# Patient Record
Sex: MALE | Race: WHITE | NOT HISPANIC OR LATINO | Employment: OTHER | URBAN - METROPOLITAN AREA
[De-identification: names, ages, dates, MRNs, and addresses within clinical notes are randomized per-mention and may not be internally consistent; named-entity substitution may affect disease eponyms.]

---

## 2018-02-09 ENCOUNTER — OFFICE VISIT CONVERTED (OUTPATIENT)
Dept: ORTHOPEDIC SURGERY | Facility: CLINIC | Age: 63
End: 2018-02-09
Attending: ORTHOPAEDIC SURGERY

## 2018-05-11 ENCOUNTER — OFFICE VISIT CONVERTED (OUTPATIENT)
Dept: ORTHOPEDIC SURGERY | Facility: CLINIC | Age: 63
End: 2018-05-11
Attending: ORTHOPAEDIC SURGERY

## 2018-05-31 ENCOUNTER — OFFICE VISIT CONVERTED (OUTPATIENT)
Dept: PULMONOLOGY | Facility: CLINIC | Age: 63
End: 2018-05-31
Attending: INTERNAL MEDICINE

## 2018-09-28 ENCOUNTER — CONVERSION ENCOUNTER (OUTPATIENT)
Dept: ORTHOPEDIC SURGERY | Facility: CLINIC | Age: 63
End: 2018-09-28

## 2018-09-28 ENCOUNTER — OFFICE VISIT CONVERTED (OUTPATIENT)
Dept: ORTHOPEDIC SURGERY | Facility: CLINIC | Age: 63
End: 2018-09-28
Attending: ORTHOPAEDIC SURGERY

## 2019-08-27 ENCOUNTER — OFFICE VISIT CONVERTED (OUTPATIENT)
Dept: PULMONOLOGY | Facility: CLINIC | Age: 64
End: 2019-08-27
Attending: PHYSICIAN ASSISTANT

## 2019-08-27 ENCOUNTER — HOSPITAL ENCOUNTER (OUTPATIENT)
Dept: GENERAL RADIOLOGY | Facility: HOSPITAL | Age: 64
Discharge: HOME | End: 2019-08-27
Attending: PHYSICIAN ASSISTANT

## 2019-08-28 ENCOUNTER — HOSPITAL ENCOUNTER (OUTPATIENT)
Dept: CARDIOLOGY | Facility: HOSPITAL | Age: 64
Discharge: HOME | End: 2019-08-28
Attending: FAMILY MEDICINE

## 2019-09-09 ENCOUNTER — OFFICE VISIT CONVERTED (OUTPATIENT)
Dept: PULMONOLOGY | Facility: CLINIC | Age: 64
End: 2019-09-09
Attending: PHYSICIAN ASSISTANT

## 2019-09-09 ENCOUNTER — HOSPITAL ENCOUNTER (OUTPATIENT)
Dept: GENERAL RADIOLOGY | Facility: HOSPITAL | Age: 64
Discharge: HOME | End: 2019-09-09
Attending: PHYSICIAN ASSISTANT

## 2021-05-16 VITALS — HEIGHT: 70 IN | BODY MASS INDEX: 39.08 KG/M2 | WEIGHT: 273 LBS

## 2021-05-16 VITALS — BODY MASS INDEX: 40.09 KG/M2 | WEIGHT: 280 LBS | HEIGHT: 70 IN | OXYGEN SATURATION: 98 % | HEART RATE: 57 BPM

## 2021-05-16 VITALS — HEIGHT: 70 IN | BODY MASS INDEX: 41.52 KG/M2 | WEIGHT: 290 LBS

## 2021-05-28 VITALS
HEART RATE: 90 BPM | TEMPERATURE: 99.2 F | WEIGHT: 277.12 LBS | SYSTOLIC BLOOD PRESSURE: 149 MMHG | RESPIRATION RATE: 12 BRPM | DIASTOLIC BLOOD PRESSURE: 71 MMHG | HEIGHT: 70 IN | BODY MASS INDEX: 39.67 KG/M2 | OXYGEN SATURATION: 100 %

## 2021-05-28 VITALS
HEIGHT: 70 IN | TEMPERATURE: 98.3 F | DIASTOLIC BLOOD PRESSURE: 90 MMHG | DIASTOLIC BLOOD PRESSURE: 84 MMHG | HEART RATE: 123 BPM | SYSTOLIC BLOOD PRESSURE: 125 MMHG | OXYGEN SATURATION: 99 % | HEIGHT: 70 IN | RESPIRATION RATE: 14 BRPM | WEIGHT: 238 LBS | BODY MASS INDEX: 32.65 KG/M2 | SYSTOLIC BLOOD PRESSURE: 150 MMHG | RESPIRATION RATE: 16 BRPM | HEART RATE: 66 BPM | WEIGHT: 228.06 LBS | OXYGEN SATURATION: 97 % | TEMPERATURE: 97.7 F | BODY MASS INDEX: 34.07 KG/M2

## 2021-05-28 NOTE — PROGRESS NOTES
Patient: CHINYERE OH     Acct: [id number]     Report: #[id number]  UNIT #: [id number]     : 1955    Encounter Date:2019  PRIMARY CARE: Cory Khan  ***Signed***  --------------------------------------------------------------------------------------------------------------------  Chief Complaint      Encounter Date      Sep 9, 2019            Primary Care Provider      Cory Khan            Referring Provider      SELF,REFERRED            Patient Complaint      Patient is complaining of      Pt here for 2-4wk f/u,  increased SOA            VITALS      Height 5 ft 10 in / 177.8 cm      Weight 228 lbs 1 oz / 103.640467 kg      BSA 2.21 m2      BMI 32.7 kg/m2      Temperature 97.7 F / 36.5 C - Oral      Pulse 66      Respirations 14      Blood Pressure 125/84 Sitting, Left Arm      Pulse Oximetry 99%, Room air            HPI      The patient is a very pleasant 64 year old white male previously seen by Dr. Joe in May 2018 then saw me 2 weeks ago for an acute visit for increased     shortness of breath. He was volume overloaded, appeared to have an acute heart     failure exacerbation but type of heart failure at that time was unknown. I     instructed him to start taking 40 mg Lasix daily prescribed by his primary care     provider Dr. Khan and instructed him to have the echocardiogram done ordered by    his primary care provider. When he went for echocardiogram at [facility] he was found to have severe systolic dysfunction with an ejection     fraction of 20%. He was seen by Dr. Ohara, started on additional medications    and set up for a Life Vest with plans for ICD in 3 months if the patient's LV     function did not improve with guideline directed medical therapy. The patient     states he is doing quite a bit better now and his shortness of breath has     significantly improved as well as his lower extremity edema. He reports     compliance with his  diuretics but has not been wearing his Life Vest, weighing     himself daily or following low sodium diet. He also has a history of obstructive    sleep apnea and was supposed to be scheduled for a repeat sleep study last year,    but was lost to follow up and never that that done. He also has a history of     spinal cord injury and repeat split night study was going to be done to re-    evaluate his sleep apnea after his spinal cord injury.             I reviewed his Review of Systems, medical, surgical and family history and agree    with those as entered.      Copies To:   Prakash Joe      Constitutional:  Denies: Fatigue, Fever, Weight gain, Weight loss, Chills,     Insomnia, Other      Respiratory/Breathing:  Denies: Shortness of air, Wheezing, Cough, Hemoptysis,     Pleuritic pain, Other      Endocrine:  Denies: Polydipsia, Polyuria, Heat/cold intolerance, Diabetes, Other      Eyes:  Denies: Blurred vision, Vision Changes, Other      Ears, nose, mouth, throat:  Denies: Mouth lesions, Thrush, Throat pain,     Hoarseness, Allergies/Hay Fever, Post Nasal Drip, Headaches, Recent Head Injury,    Nose Bleeding, Neck Stiffness, Thyroid Mass, Hearing Loss, Ear Fullness, Dry     Mouth, Nasal or Sinus Pain, Dry Lips, Nasal discharge, Nasal congestion, Other      Cardiovascular:  Denies: Palpitations, Syncope, Claudication, Chest Pain, Wake     up Gasping for air, Leg Swelling, Irregular Heart Rate, Cyanosis, Dyspnea on     Exertion, Other      Gastrointestinal:  Denies: Nausea, Constipation, Diarrhea, Abdominal pain,     Vomiting, Difficulty Swallowing, Reflux/Heartburn, Dysphagia, Jaundice,     Bloating, Melena, Bloody stools, Other      Genitourinary:  Denies: Urinary frequency, Incontinence, Hematuria, Urgency,     Nocturia, Dysuria, Testicular problems, Other      Musculoskeletal:  Denies: Joint Pain, Joint Stiffness, Joint Swelling, Myalgias,    Other      Hematologic/lymphatic:  DENIES:  Lymphadenopathy, Bruising, Bleeding tendencies,     Other      Neurological:  Denies: Headache, Numbness, Weakness, Seizures, Other      Psychiatric:  Denies: Anxiety, Appropriate Effect, Depression, Other      Sleep:  No: Excessive daytime sleep, Morning Headache?, Snoring, Insomnia?, Stop    breathing at sleep?, Other      Integumentary:  Denies: Rash, Dry skin, Skin Warm to Touch, Other      Immunologic/Allergic:  Denies: Latex allergy, Seasonal allergies, Asthma,     Urticaria, Eczema, Other      Immunization status:  No: Up to date            FAMILY/SOCIAL/MEDICAL HX      Surgical History:  Yes: Appendectomy (1967), Orthopedic Surgery (RT TOTAL KNEE     07/08, LT TOTAL KNEE 08, LT KNEE RE.09, ROTATOR CUFF LT 2012), Other Surgeries     (PILONIDAL CYST 1977 AND 1979, cervical effusion)      Cancer/Type - Family Hx:  Father      Is Father Still Living?:  No      Is Mother Still Living?:  No      Smoking status:  Never smoker      Anticoagulation Therapy:  Yes      Antibiotic Prophylaxis:  Yes      Medical History:  Yes: Allergies, Arthritis (WIDESPREAD OSTEO), Chronic     Bronchitis/COPD, High Blood Pressure (MEDS CONTROLLED), High Cholesterol,     Shortness Of Breath (INHALERS FOR SEASONAL ALLERGY WHEEZING), Miscellaneous     Medical/oth (LEFT LEG AND ARM WEAKNESS); No: Asthma, Blood Disease,     Chemotherapy/Cancer, Congestive Heart Failu, Deafness or Ringing Ears, Diabetes,    Seizures, Heart Attack, Hemorrhoids/Rectal Prob, Sinus Trouble      Psychiatric History      None            PREVENTION      Hx Influenza Vaccination:  Yes      Date Influenza Vaccine Given:  Nov 1, 2018      Influenza Vaccine Declined:  No      2 or More Falls Past Year?:  No      Fall Past Year with Injury?:  No      Hx Pneumococcal Vaccination:  Yes      Encouraged to follow-up with:  PCP regarding preventative exams.      Chart initiated by      Ronda Sloan Ma            ALLERGIES/MEDICATIONS      Allergies:        Coded Allergies:              *No Known Allergies (Verified  Allergy, Unknown, 9/9/19)      Uncoded Allergies:             NO KNOWN DRUG ALLERGY (Allergy, Unknown, NONE, 2/26/18)      Medications    Last Reconciled on 9/9/19 16:18 by JORGE CASTRO      Potassium Chloride (K-Dur*) 10 Meq Tab.er.prt      10 MEQ PO QDAY, #30 TAB 0 Refills         Prov: Conor Ohara         8/30/19       Furosemide (Furosemide) 40 Mg Tablet      40 MG PO QDAY, #30 TAB 0 Refills         Prov: Conor Ohara         8/30/19       Spironolactone (Aldactone) 25 Mg Tablet      25 MG PO QDAY, #30 TAB 0 Refills         Prov: Marcos Oharanan         8/30/19       Aspirin EC (Aspirin EC) 81 Mg Tablet.dr      162 MG PO QDAY, #60 TAB 0 Refills         Reported         8/28/19       Gabapentin (Gabapentin) 800 Mg Tablet      800 MG PO QID, #90 TAB 0 Refills         Reported         8/28/19       Cyclobenzaprine Hcl (Cyclobenzaprine*) 10 Mg Tablet      10 MG PO TID, TAB 0 Refills         Reported         5/31/18       Zolpidem Tartrate (Ambien) 10 Mg Tablet      10 MG PO HS, #30 TAB 0 Refills         Reported         5/31/18       Modafinil (Modafinil) 200 Mg Tab      300 MG PO BID, #30 TAB         Reported         5/31/18       Ibuprofen (Motrin) 800 Mg Tablet      800 MG PO TID PRN for PAIN, #90 TAB 0 Refills         Reported         2/12/18       MDI-Albuterol (Ventolin HFA) 8 Gm Hfa.aer.ad      2 PUFFS INH Q4-6H PRN for SHORTNESS OF BREATH, #1 MDI 0 Refills         Reported         2/12/18       Acetaminophen/Hydrocodone 10/325 (Hydrocodone/Acetaminophen 10/325) 1 Each     Tablet      1-2 TAB PO Q6H, TAB         Reported         2/12/18       Famotidine (Pepcid*) 20 Mg Tablet      20 MG PO QDAY, TAB         Reported         5/21/13       Clopidogrel Bisulfate (Plavix) 75 Mg Tablet      75 MG PO QDAY, TAB         Reported         5/20/13       Carvedilol (Coreg) 12.5 Mg Tab      12.5 MG PO BID         Reported         3/6/13       Lisinopril*  (Lisinopril*) 20 Mg Tablet      20 MG PO QDAY, #30         Reported         3/6/13       Multivitamins (Multi-Vitamin) 1 Tab Tablet      1 TAB PO QDAY         Reported         7/30/12      Current Medications      Current Medications Reviewed 9/9/19            EXAM      GEN-patient appears stated age resting comfortable in no acute distress      Eyes-PERRL,  conjunctiva are normal in appearance extraocular muscles are     intact, no scleral icterus      Lymphatic-no swollen or enlarged cervical nodes, or axillary node, or femoral     nodes, or supraclavicular nodes      Mouth normal dentition, no erythema no ulcerations oropharynx appears normal no     exudate no evidence of postnasal drip, MP       Neck-there are no palpable supraclavicular or cervical adenopathy, thyroid is     normal in appearance no apparent nodules, there is no inspiratory or expiratory     stridor      Respiratory-Mildly decreased breath sounds throughout, no wheezes, rhonchi or     crackles, normal work of breathing noted.        Cardiovascular-the heart rate is normal and regular S1 and S2 present with no     murmur or extra heart sounds, there is no JVD or pedal edema present      GI-the abdomen is normal in appearance, bowel sounds present and normal in all     quadrants no hepatosplenomegaly or masses felt      Extremities-no clubbing is present, pulses present in all extremities, capillary    refill time is normal      Musculoskeletal-Normal strength in upper and lower extremities, inspection shows    no evidence of muscle atrophy      Skin-skin is normal in appearance it is warm and dry, no rashes present, no     evidence of cyanosis, palpation reveals no masses      Neurological-the patient is alert and oriented to time place and person, moves     all 4 extremities, normal gait, normal affect and mood, CN2-12 intact      Psych-normal judgment and insight is good, normal mood and affect, alert and     oriented to person, place, and  time, and date      Vtials      Vitals:             Height 5 ft 10 in / 177.8 cm           Weight 228 lbs 1 oz / 103.698065 kg           BSA 2.21 m2           BMI 32.7 kg/m2           Temperature 97.7 F / 36.5 C - Oral           Pulse 66           Respirations 14           Blood Pressure 125/84 Sitting, Left Arm           Pulse Oximetry 99%, Room air            REVIEW      Results Reviewed      [facility] Results Reviewed?:  Yes Prev Lab Results, Yes Prev Radiology Results, Yes     Previous [facility] Records (I personally reviewed the patient's most recent     pulmonary consultation, progress notes and discharge summary.)            Assessment      RADHA (obstructive sleep apnea) - G47.33            Notes      New Diagnostics      * Split Night Sleep Study, Week         Dx: RADHA (obstructive sleep apnea) - G47.33      ASSESSMENT:       1. Acute systolic heart failure exacerbation with ejection fraction 20%, with     volume status now improving.       2. Lower extremity edema, improving.       3. Recent abnormal chest x-ray with shadow versus right lower lobe opacity with     follow up chest x-ray ordered.       4. Obstructive sleep apnea.      5. History of spinal cord injury.       6. Medical noncompliance.              PLAN:      1.  At this time, I have discussed with the patient regarding his systolic heart    failure. I instructed him to continue following up with Dr. Ohara as     scheduled for additional ischemic work up. Continue his cardiac medications per     Dr. Ohara or his primary care provider Dr. Khan. His volume status is     improving.  I instructed him to begin weighing himself every morning, do low sod    ium intake of 2 grams or less per day and fluid restriction of 2 liters or less     per day. He is to call his cardiologist or primary care provider for any weight     gain greater than 3 pounds overnight or 5 pounds in 1 week. The patient     verbalized understanding of this.         2. I have  discussed with him that his recent lab work showed stable renal     function and his potassium level had improved and normalized now that he is on a    potassium supplement.       3. I have already ordered a repeat chest xray to ensure there is no opacity in     the right lower lobe. It appears it is likely a shadow.         4. I have discussed with him regarding repeating a sleep study and he is willing    to have a split night sleep study done for re-evaluation of his obstructive     sleep apnea after his spinal cord injury. I reordered this today.         5. Continue follow up with his primary care provider Dr. Khan regarding     unintended weight loss.       6. Follow up with Dr. Joe in 2-3 months to review his test results and see     how he is doing, sooner if needed.            Patient Education      Time Spent:  > 50% /Coord [facility]                 Disclaimer: Converted document may not contain table formatting or lab diagrams. Please see [employer] for the authenticated document.

## 2021-05-28 NOTE — PROGRESS NOTES
Patient: CHINYERE OH     Acct: [id number]     Report: #[id number]  UNIT #: [id number]     : 1955    Encounter Date:2019  PRIMARY CARE: Cory Khan  ***Signed***  --------------------------------------------------------------------------------------------------------------------  Chief Complaint      Encounter Date      Aug 27, 2019            Primary Care Provider      Cory Khan            Referring Provider      SELF,REFERRED            Patient Complaint      Patient is complaining of      Patient here today for acute visit for increased SOA            VITALS      Height 70 in / 177.8 cm      Weight 238 lbs  / 107.691089 kg      BSA 2.25 m2      BMI 34.1 kg/m2      Temperature 98.3 F / 36.83 C - Oral      Pulse 123      Respirations 16      Blood Pressure 150/90 Sitting, Right Arm      Pulse Oximetry 97%, room air            HPI      The patient is a 64 year old white male with was seen once by Dr. Joe in the    past in 2018 for evaluation of shortness of breath. He had also had bulky     mediastinal and hilar lymphadenopathy seen on a previous chest CT. Follow up CT     was done in 2018 that showed resolution of lymphadenopathy, no pleural     effusion, no suspicious nodules, but some calcified granulomas were seen. He has    had a history of a spinal cord injury and still has some left sided weakness and    paresis, partial paralysis.  He is here today complaining of a two week history     of increased dyspnea on exertion with minimal exertion relieved with rest. He     denies coughing, wheezing, hemoptysis, fever, chills or purulent sputum     production. He is complaining of associated lower extremity edema, has not     weighed himself to see if he has gained weight in the past two weeks or not,     however he does report an approximately 70 pound weight loss in the past six     months to one year and tells me this has not been evaluated. His PCP, Dr. Ray  Bill actually ordered an echocardiogram for the patient and prescribed     Lasix 40 mg daily for the patient to take for two weeks.  He is due to follow up    with Dr. Khan this Friday or three days from now, but has not taken the Lasix.     He tells me he did not want to have to get up more frequently to use the     restroom and was afraid he would be waking up at night to do so.  He tells me     has not been diagnosed with congestive heart failure before.            I have reviewed her ROS, medical, surgical and family history and agree with     those as entered in the chart.      Copies To:   Prakash Joe      Constitutional:  Complains of: Fatigue; Denies: Fever, Weight gain, Weight loss,    Chills, Insomnia, Other      Respiratory/Breathing:  Complains of: Shortness of air, Wheezing, Cough; Denies:    Hemoptysis, Pleuritic pain, Other      Endocrine:  Denies: Polydipsia, Polyuria, Heat/cold intolerance, Diabetes, Other      Eyes:  Denies: Blurred vision, Vision Changes, Other      Ears, nose, mouth, throat:  Complains of: Nasal Discharge; Denies: Congestion,     Dysphagia, Hearing Changes, Nose Bleeding, Throat pain, Tinnitus, Other      Cardiovascular:  Denies: Chest Pain, Exertional dyspnea, Peripheral Edema,     Palpitations, Syncope, Wake up Gasping for air, Orthopnea, Tachycardia, Other      Gastrointestinal:  Denies: Abdominal pain/cramping, Bloody stools, Constipation,    Diarrhea, Melena, Nausea, Vomiting, Other      Genitourinary:  Denies: Dysuria, Urinary frequency, Incontinence, Hematuria,     Urgency, Other      Musculoskeletal:  Denies: Joint Pain, Joint Stiffness, Joint Swelling, Myalgias,    Other      Hematologic/lymphatic:  DENIES: Lymphadenopathy, Bruising, Bleeding tendencies,     Other      Neurologic:  Complains of: Weakness; Denies: Headache, Numbness, Seizures, Other      Psychiatric:  Denies: Anxiety, Appropriate Effect, Depression, Other      Sleep:  No: Excessive  daytime sleep, Morning Headache?, Snoring, Insomnia?, Stop    breathing at sleep?, Other      Integumentary:  Denies: Rash, Dry skin, Skin Warm to Touch, Other            FAMILY/SOCIAL/MEDICAL HX      Surgical History:  Yes: Appendectomy (1967), Orthopedic Surgery (RT TOTAL KNEE     07/08, LT TOTAL KNEE 08, LT KNEE RE.09, ROTATOR CUFF LT 2012), Other Surgeries     (PILONIDAL CYST 1977 AND 1979, cervical effusion)      Cancer/Type - Family Hx:  Father      Is Father Still Living?:  No      Is Mother Still Living?:  No      Smoking status:  Never smoker      Anticoagulation Therapy:  Yes      Antibiotic Prophylaxis:  Yes      Medical History:  Yes: Allergies, Arthritis (WIDESPREAD OSTEO), Chronic     Bronchitis/COPD, High Blood Pressure (MEDS CONTROLLED), High Cholesterol,     Shortness Of Breath (INHALERS FOR SEASONAL ALLERGY WHEEZING), Miscellaneous     Medical/oth (LEFT LEG AND ARM WEAKNESS); No: Asthma, Blood Disease,     Chemotherapy/Cancer, Congestive Heart Failu, Deafness or Ringing Ears, Diabetes,    Seizures, Heart Attack, Hemorrhoids/Rectal Prob, Sinus Trouble      Psychiatric History      none            PREVENTION      Date Influenza Vaccine Given:  Nov 1, 2017      Influenza Vaccine Declined:  No      2 or More Falls Past Year?:  No      Fall Past Year with Injury?:  No      Encouraged to follow-up with:  PCP regarding preventative exams.      Chart initiated by      Danya Preston CMA            ALLERGIES/MEDICATIONS      Allergies:        Coded Allergies:             *No Known Allergies (Verified  Allergy, Unknown, 8/27/19)      Uncoded Allergies:             NO KNOWN DRUG ALLERGY (Allergy, Unknown, NONE, 2/26/18)      Medications    Last Reconciled on 8/27/19 16:13 by JORGE CASTRO      Cyclobenzaprine Hcl (Cyclobenzaprine*) 10 Mg Tablet      10 MG PO TID PRN for MUSCLE SPASMS, TAB 0 Refills         Reported         5/31/18       Zolpidem Tartrate (Ambien) 10 Mg Tablet      10 MG PO HS, #30 TAB 0  Refills         Reported         5/31/18       Modafinil (Modafinil) 200 Mg Tab      300 MG PO BID, #30 TAB         Reported         5/31/18       Devin-Fluticasone (Fluticasone 50 mcg) 16 Gm Spray.susp      2 PUFFS NARE EACH QDAY, #1 BOTTLE 0 Refills         Reported         5/31/18       Cetirizine/Pseudoephedrine (ZyrTEC-D) 1 Each Tab.er.12h      1 TAB PO BID PRN for ALLERGIES for 30 Days, #60 TAB         Reported         2/12/18       Ibuprofen (Motrin) 800 Mg Tablet      800 MG PO TID, #90 TAB 0 Refills         Reported         2/12/18       MDI-Albuterol (Ventolin HFA) 8 Gm Hfa.aer.ad      1 PUFFS INH Q4-6H PRN for SHORTNESS OF BREATH, #1 MDI 0 Refills         Reported         2/12/18       Acetaminophen/Hydrocodone 10/325 (Hydrocodone/Acetaminophen 10/325) 1 Each     Tablet      1 TAB PO Q6H PRN for PAIN, TAB         Reported         2/12/18       Famotidine (Pepcid*) 20 Mg Tablet      20 MG PO QDAY, TAB         Reported         5/21/13       Aspirin EC (Aspirin EC) 325 Mg Tabec      162.5 MG PO QDAY         Reported         5/20/13       Clopidogrel Bisulfate (Plavix) 75 Mg Tablet      75 MG PO QDAY, TAB         Reported         5/20/13       Atorvastatin Calcium (Lipitor*) 20 Mg Tablet      20 MG PO HS, TAB         Reported         5/20/13       Docusate Sodium (Colace) 100 Mg Capsule      100 MG PO BID PRN for CONSTIPATION, CAP         Reported         5/16/13       Gabapentin (Neurontin) 300 Mg Capsule      800 MG PO QID, CAP         Reported         5/2/13       Carvedilol (Coreg) 12.5 Mg Tab      12.5 MG PO BID         Reported         3/6/13       Lisinopril* (Lisinopril*) 20 Mg Tablet      20 MG PO QDAY, #30         Reported         3/6/13       Multivitamins (Multi-Vitamin) 1 Tab Tablet      1 TAB PO QDAY         Reported         7/30/12      Current Medications      Current Medications Reviewed 8/27/19            EXAM      GEN-patient appears stated age resting comfortable in no acute distress       Eyes-PERRL,  conjunctiva are normal in appearance extraocular muscles are     intact, no scleral icterus      Nasal-both nares are patent turbinates appear normal no polyps seen no nasal d    ischarge or ulcerations      Ears-tympanic membranes are normal no erythema no bulging, normal to inspection      Lymphatic-no swollen or enlarged cervical nodes, or axillary node, or femoral     nodes, or supraclavicular nodes      Mouth normal dentition, no erythema no ulcerations oropharynx appears normal no     exudate no evidence of postnasal drip, MP(default value)      Neck-there are no palpable supraclavicular or cervical adenopathy, thyroid is     normal in appearance no apparent nodules, there is no inspiratory or expiratory     stridor      Respiratory-Lungs have mildly decreased breath sounds, but no wheezes, rhonchi     or crackles appreciated.  Normal work of breathing.        Cardiovascular-the heart rate is normal and regular S1 and S2 present with no     murmur or extra heart sounds, bilateral lower extremities are warm and dry with     trace right lower extremity pitting edema, +1-2 pitting edema in the left lower     extremity up to mid calf.        Extremities-no clubbing is present, pulses present in all extremities, capillary    refill time is normal      Musculoskeletal-Normal strength in upper and lower extremities, inspection shows    no evidence of muscle atrophy      Skin-skin is normal in appearance it is warm and dry, no rashes present, no     evidence of cyanosis, palpation reveals no masses      Neurological-the patient is alert and oriented to time place and person, moves     all 4 extremities, normal gait, normal affect and mood, CN2-12 intact      Psych-normal judgment and insight is good, normal mood and affect, alert and     oriented to person, place, time and date      Vitals      Vitals:             Height 70 in / 177.8 cm           Weight 238 lbs  / 107.674139 kg           BSA 2.25 m2            BMI 34.1 kg/m2           Temperature 98.3 F / 36.83 C - Oral           Pulse 123           Respirations 16           Blood Pressure 150/90 Sitting, Right Arm           Pulse Oximetry 97%, room air            REVIEW      Results Reviewed      [facility] Results Reviewed?:  Yes Prev Lab Results, Yes Prev Radiology Results, Yes     Previous [facility] Records      Lab Results      I personally reviewed previous lab work, imaging and provider notes including     most recent chest CT.            Assessment      Acute on chronic diastolic (congestive) heart failure - I50.33            SOB (shortness of breath) - R06.02            Notes      New Diagnostics      * Chest 2 View, As Soon As Possible         Dx: Acute on chronic diastolic (congestive) heart failure - I50.33      * BMP, Week         Dx: Acute on chronic diastolic (congestive) heart failure - I50.33      * Probrain Natriuretic, Week         Dx: Acute on chronic diastolic (congestive) heart failure - I50.33      ASSESSMENT:       1. Dyspnea on exertion.      2.  Likely acute congestive heart failure, type currently unknown.      3. Lower extremity edema.      4. History of mediastinal or hilar lymphadenopathy, had resolved on chest CT     from 06/2018.      5. Obstructive sleep apnea.      6. Unintended weight loss.      7. History of spinal cord injury.      8. Never smoker.      9.  Medical noncompliance.              PLAN:      1.  At this time, I have discussed with the patiaent that given his lower     extremity edema and increased dyspnea as well as abdominal distention, I have     recommended that he go ahead and have the echocardiogram done that his PCP     ordered and start taking the Lasix 40 mg daily that was prescribed by Dr. Khan,    his PCP.  I have advised him to take that first thing in the morning and he     should not have to get up frequently during the night if he is not taking any     evening dose of Lasix.        2.  I will check a BMP and  pro BNP for him in one week after he has been on     Lasix to ensure his renal function and electrolytes are stable.       3.  I will check a chest x-ray for him today to evaluate for pleural effusions     or pulmonary edema.  We will call him with the results.        4. I have counseled him to start checking his weights daily, do 2 gram sodium     restriction and 2 liter fluid restriction daily and he verbalized understanding.           5. I have discussed with him that he has had a significant unintended weight     loss and have recommended additional evaluation such as CT of the chest, abdomen    and pelvis. He declines this, wishes to continue following up with his PCP about    the unintended weight loss.        6. I have discussed with him regarding split night study that was ordered and     not completed in the past and he wishes to hold off on that as well.        7. Of note, patient did ask me for pain medication today, even though we have     never prescribed him pain medication in the past. He gets this from his PCP, Dr. Khan and is due to see him in three days.  If he needs pain medication sooner,     he is to contact Dr. Khan's office.  He verbalized understanding.      8. I will have him follow up in 2-4 weeks with Dr. Joe to see how he is     doing on the Lasix.  He is to call us sooner if needed.            Patient Education      Time Spent:  > 50% /Coord Care                 Disclaimer: Converted document may not contain table formatting or lab diagrams. Please see [employer] for the authenticated document.

## 2021-05-28 NOTE — PROGRESS NOTES
Patient: CHINYERE OH     Acct: [id number]     Report: #[id number]  UNIT #: [id number]     : 1955    Encounter Date:2018  PRIMARY CARE: Cory Khan  ***Signed***  --------------------------------------------------------------------------------------------------------------------  Chief Complaint      Encounter Date      May 31, 2018            Primary Care Provider      Cory Khan            Referring Provider      SELF,REFERRED PATIENT            Patient Complaint      Patient is complaining of      New pt here for Inhaler management            VITALS      Height 5 ft 10 in / 177.8 cm      Weight 277 lbs 2 oz / 125.394361 kg      BSA 2.55 m2      BMI 39.8 kg/m2      Temperature 99.2 F / 37.33 C - Oral      Pulse 90      Respirations 12      Blood Pressure 149/71 Sitting, Left Arm      Pulse Oximetry 100%, Room air            HPI      The patient is a very pleasant 63 year old white male here today to establish     care. The patient was diagnosed with COPD back in . At that time, the     patient had a spinal cord injury and a CT scan of the neck that showed large     bulky mediastinal and hilar lymphadenopathy that was heavily calcified.  The     patient was seen by another pulmonologist here in town and told that he had     COPD despite that he had never smoked.  He was started on three different     inhaler medications, but never noticed any improvement of his symptoms.  The     patient does have chronic shortness of breath, worse with exertion, better with     rest. He feels his shortness of breath is most likely weakness induced from a     spinal cord injury that he had which left him with some left sided paresis.  He     is able to do his ADLs, but does have to have assistance with a walker.  No     cough, wheezing or chest pains and no sputum production.  The patient derived     no benefit from his bronchodilator therapies, quit taking these medications     five months ago  and has no significant change in his symptoms. The patient does     have sleep apnea, does use his mask, but feels that his machine is very old and     would like a new machine. The patient has no signs of systemic manifestations     or sarcoidosis, but never had any follow up imaging in regards to axial imaging     for this mediastinal and hilar lymphadenopathy. The patient with no kidneys and     no hypercalcemia.            ROS      Constitutional:  Denies: Fatigue, Fever, Weight gain, Weight loss, Chills,     Insomnia, Other      Respiratory/Breathing:  Complains of: Shortness of air, Denies: Wheezing, Cough    , Hemoptysis, Pleuritic pain, Other      Endocrine:  Denies: Polydipsia, Polyuria, Heat/cold intolerance, Diabetes, Other      Eyes:  Denies: Blurred vision, Vision Changes, Other      Ears, nose, mouth, throat:  Denies: Mouth lesions, Thrush, Throat pain,     Hoarseness, Allergies/Hay Fever, Post Nasal Drip, Headaches, Recent Head Injury    , Nose Bleeding, Neck Stiffness, Thyroid Mass, Hearing Loss, Ear Fullness, Dry     Mouth, Nasal or Sinus Pain, Dry Lips, Nasal discharge, Nasal congestion, Other      Cardiovascular:  Denies: Palpitations, Syncope, Claudication, Chest Pain, Wake     up Gasping for air, Leg Swelling, Irregular Heart Rate, Cyanosis, Dyspnea on     Exertion, Other      Gastrointestinal:  Denies: Nausea, Constipation, Diarrhea, Abdominal pain,     Vomiting, Difficulty Swallowing, Reflux/Heartburn, Dysphagia, Jaundice, Bloating    , Melena, Bloody stools, Other      Genitourinary:  Denies: Urinary frequency, Incontinence, Hematuria, Urgency,     Nocturia, Dysuria, Testicular problems, Other      Musculoskeletal:  Denies: Joint Pain, Joint Stiffness, Joint Swelling, Myalgias    , Other      Hematologic/lymphatic:  DENIES: Lymphadenopathy, Bruising, Bleeding tendencies,     Other      Neurological:  Denies: Headache, Numbness, Weakness, Seizures, Other      Psychiatric:  Denies: Anxiety,  Appropriate Effect, Depression, Other      Sleep:  No: Excessive daytime sleep, Morning Headache?, Snoring, Insomnia?,     Stop breathing at sleep?, Other      Integumentary:  Denies: Rash, Dry skin, Skin Warm to Touch, Other      Immunologic/Allergic:  Denies: Latex allergy, Seasonal allergies, Asthma,     Urticaria, Eczema, Other      Immunization status:  No: Up to date            FAMILY/SOCIAL/MEDICAL HX      Surgical History:  Yes: Appendectomy (1967), Orthopedic Surgery (RT TOTAL KNEE     07/08, LT TOTAL KNEE 08, LT KNEE RE.09, ROTATOR CUFF LT 2012), Other Surgeries (    PILONIDAL CYST 1977 AND 1979, cervical effusion)      Cancer/Type - Family Hx:  Father      Is Father Still Living?:  No      Is Mother Still Living?:  No      Smoking status:  Never smoker      Anticoagulation Therapy:  Yes      Antibiotic Prophylaxis:  Yes      Medical History:  Yes: Allergies, Arthritis (WIDESPREAD OSTEO), Chronic     Bronchitis/COPD, High Blood Pressure (MEDS CONTROLLED), High Cholesterol,     Shortness Of Breath (INHALERS FOR SEASONAL ALLERGY WHEEZING), Miscellaneous     Medical/oth (LEFT LEG AND ARM WEAKNESS), No: Asthma, Blood Disease, Chemotherapy    /Cancer, Congestive Heart Failu, Deafness or Ringing Ears, Diabetes, Seizures,     Heart Attack, Hemorrhoids/Rectal Prob      Psychiatric History      None            PREVENTION      Hx Influenza Vaccination:  Yes      Date Influenza Vaccine Given:  Nov 1, 2017      Influenza Vaccine Declined:  No      2 or More Falls Past Year?:  No      Fall Past Year with Injury?:  No      Hx Pneumococcal Vaccination:  Yes      Encouraged to follow-up with:  PCP regarding preventative exams.      Chart initiated by      Ronda Sloan MA            ALLERGIES/MEDICATIONS      Allergies:        Coded Allergies:             *No Known Allergies (Verified  Allergy, Unknown, 5/31/18)      Uncoded Allergies:             NO KNOWN DRUG ALLERGY (Allergy, Unknown, NONE, 2/26/18)      Medications       Cyclobenzaprine Hcl (Cyclobenzaprine*) 10 Mg Tablet      10 MG PO TID Y for MUSCLE SPASMS, TAB 0 Refills         Reported         5/31/18       Zolpidem Tartrate (Ambien) 10 Mg Tablet      10 MG PO HS, #30 TAB 0 Refills         Reported         5/31/18       Modafinil (Provigil) 200 Mg Tab      300 MG PO BID, #30 TAB         Reported         5/31/18       Devin-Fluticasone (Fluticasone 50 mcg) 16 Gm Spray.susp      2 PUFFS NARE EACH QDAY, #1 BOTTLE 0 Refills         Reported         5/31/18       Cetirizine/Pseudoephedrine (ZyrTEC-D*) 1 Each Tab.er.12h      1 TAB PO BID Y for ALLERGIES for 30 Days, #60 TAB         Reported         2/12/18       Ibuprofen (Motrin) 800 Mg Tablet      800 MG PO TID, #90 TAB 0 Refills         Reported         2/12/18       Tiotropium Bromide (Spiriva Respimat 2.5 mcg/Puff) 4 Gm Mist.inhal      2 PUFFS INH RTQDAY, #1 MDI 0 Refills         Reported         2/12/18       MDI-Advair 250/50 (Advair 250/50 Diskus) 1 Each Blst.w.dev      1 PUFF INH RTBID, #1 INH 0 Refills         Reported         2/12/18       MDI-Albuterol (Ventolin HFA*) 8 Gm Hfa.aer.ad      1 PUFFS INH Q4-6H Y for SHORTNESS OF BREATH, #1 MDI 0 Refills         Reported         2/12/18       Acetaminophen/Hydrocodone 10/325* (Hydrocodone/Acetaminophen 10/325*) 1 Each     Tablet      1 TAB PO Q6H Y for PAIN, TAB         Reported         2/12/18       Famotidine (Pepcid*) 20 Mg Tablet      20 MG PO QDAY, TAB         Reported         5/21/13       Aspirin EC (Aspirin EC*) 325 Mg Tabec      162.5 MG PO QDAY         Reported         5/20/13       Clopidogrel Bisulfate (Plavix) 75 Mg Tablet      75 MG PO QDAY, TAB         Reported         5/20/13       Atorvastatin Calcium (Lipitor*) 20 Mg Tablet      20 MG PO HS, TAB         Reported         5/20/13       Docusate Sodium (Colace) 100 Mg Capsule      100 MG PO BID Y for CONSTIPATION, CAP         Reported         5/16/13       Gabapentin (Neurontin) 300 Mg Capsule      800 MG PO  QID, CAP         Reported         5/2/13       Carvedilol (Coreg) 12.5 Mg Tab      12.5 MG PO BID         Reported         3/6/13       Lisinopril* (Lisinopril*) 20 Mg Tablet      20 MG PO QDAY, #30         Reported         3/6/13       Multivitamins (Multi-Vitamin) 1 Tab Tablet      1 TAB PO QDAY         Reported         7/30/12      Current Medications      Current Medications Reviewed 5/31/18            EXAM      GEN-patient appears stated age resting comfortable in no acute distress      Eyes-PERRL,  conjunctiva are normal in appearance extraocular muscles are intact    , no scleral icterus      Nasal-both nares are patent turbinates appear normal no polyps seen no nasal     discharge or ulcerations      Ears-tympanic membranes are normal no erythema no bulging, normal to inspection      Lymphatic-no swollen or enlarged cervical nodes, or axillary node, or femoral     nodes, or supraclavicular nodes      Mouth normal dentition, no erythema no ulcerations oropharynx appears normal no     exudate no evidence of postnasal drip, MP(2)      Neck-there are no palpable supraclavicular or cervical adenopathy, thyroid is     normal in appearance no apparent nodules, there is no inspiratory or expiratory     stridor      Respiratory-patient exhibits normal work of breathing, speaking in full     sentences without difficulty, the chest is normal in appearance, clear to     auscultation with no wheezes rales or rhonchi, chest is normal to percussion on     both the right and left sides      Cardiovascular-the heart rate is normal and regular S1 and S2 present with no     murmur or extra heart sounds, there is no JVD or pedal edema present      GI-the abdomen is normal in appearance, bowel sounds present and normal in all     quadrants no hepatosplenomegaly or masses felt      Extremities-no clubbing is present, pulses present in all extremities,     capillary refill time is normal      Musculoskeletal-Decreased strength in  the left upper and left lower extremity.        Skin-skin is normal in appearance it is warm and dry, no rashes present, no     evidence of cyanosis, palpation reveals no masses      Neurological-the patient is alert and oriented to time place and person, moves     all 4 extremities, gait is assisted with a walker, normal affect and mood, CN2-    12 intact      Psych-normal judgment and insight is good, normal mood and affect, alert and     oriented to person, place, and time, and date      Vtials      Vitals:             Height 5 ft 10 in / 177.8 cm           Weight 277 lbs 2 oz / 125.422163 kg           BSA 2.55 m2           BMI 39.8 kg/m2           Temperature 99.2 F / 37.33 C - Oral           Pulse 90           Respirations 12           Blood Pressure 149/71 Sitting, Left Arm           Pulse Oximetry 100%, Room air            REVIEW      Results Reviewed      PCCS Results Reviewed?:  Yes Prev Lab Results, Yes Prev Radiology Results, Yes     Previous [facility] Records            Assessment      Mediastinal adenopathy - R59.0            RADHA (obstructive sleep apnea) - G47.33            Notes      New Medications      * Devin-Fluticasone (Fluticasone 50 mcg) 16 GM SPRAY.SUSP: 2 PUFFS NARE EACH QDAY     #1      * Modafinil (Provigil) 200 MG TAB: 300 MG PO BID #30      * ZOLPIDEM TARTRATE (Ambien) 10 MG TABLET: 10 MG PO HS #30      * CYCLOBENZAPRINE HCL (Cyclobenzaprine*) 10 MG TABLET: 10 MG PO TID PRN MUSCLE     SPASMS      New Diagnostics      * Chest W/O Cont CT, SCHEDULED PROCEDURE       Dx: Mediastinal adenopathy - R59.0      * Split Night Sleep Study, Week       Dx: RADHA (obstructive sleep apnea) - G47.33      ASSESSMENT/PLAN:       1.  Bulky mediastinal hilar lymphadenopathy.  Most likely granulomatous     disease.  Repeat CT scan of the chest.      2. Restrictive lung disease.  Most likely related to weight as well as spinal     cord injury and weakness. CT scan as above for further evaluation. We will      discontinue all inhaler medications.  Pulmonary function studies shows no     evidence of COPD.      3.  Obstructive sleep apnea.  We will obtain split night sleep study at this     time to determine if there is any new changes in the patient's sleep apnea or     prescription.  The patient has had a spinal cord injury since his sleep apnea     diagnosis.      4. I have personally reviewed laboratory data, imaging as well as previous     medical records.                 Disclaimer: Converted document may not contain table formatting or lab diagrams. Please see "[employer] System for the authenticated document.

## 2022-05-03 ENCOUNTER — TRANSCRIBE ORDERS (OUTPATIENT)
Dept: ADMINISTRATIVE | Facility: HOSPITAL | Age: 67
End: 2022-05-03

## 2022-05-03 DIAGNOSIS — M25.531 RIGHT WRIST PAIN: Primary | ICD-10-CM

## 2022-05-24 ENCOUNTER — HOSPITAL ENCOUNTER (OUTPATIENT)
Dept: MRI IMAGING | Facility: HOSPITAL | Age: 67
End: 2022-05-24

## 2022-05-24 ENCOUNTER — APPOINTMENT (OUTPATIENT)
Dept: MRI IMAGING | Facility: HOSPITAL | Age: 67
End: 2022-05-24

## 2022-06-20 ENCOUNTER — HOSPITAL ENCOUNTER (OUTPATIENT)
Dept: MRI IMAGING | Facility: HOSPITAL | Age: 67
Discharge: HOME | End: 2022-06-20
Admitting: NURSE PRACTITIONER

## 2022-06-20 ENCOUNTER — APPOINTMENT (OUTPATIENT)
Dept: MRI IMAGING | Facility: HOSPITAL | Age: 67
End: 2022-06-20

## 2022-06-20 DIAGNOSIS — M25.531 RIGHT WRIST PAIN: ICD-10-CM

## 2022-06-20 PROCEDURE — 73221 MRI JOINT UPR EXTREM W/O DYE: CPT

## 2022-06-27 ENCOUNTER — TRANSCRIBE ORDERS (OUTPATIENT)
Dept: ADMINISTRATIVE | Facility: HOSPITAL | Age: 67
End: 2022-06-27

## 2022-06-27 DIAGNOSIS — M25.531 WRIST PAIN, RIGHT: Primary | ICD-10-CM

## 2022-07-21 ENCOUNTER — APPOINTMENT (OUTPATIENT)
Dept: MRI IMAGING | Facility: HOSPITAL | Age: 67
End: 2022-07-21

## 2022-07-31 ENCOUNTER — APPOINTMENT (OUTPATIENT)
Dept: GENERAL RADIOLOGY | Facility: HOSPITAL | Age: 67
End: 2022-07-31

## 2022-07-31 ENCOUNTER — APPOINTMENT (OUTPATIENT)
Dept: CT IMAGING | Facility: HOSPITAL | Age: 67
End: 2022-07-31

## 2022-07-31 ENCOUNTER — HOSPITAL ENCOUNTER (INPATIENT)
Facility: HOSPITAL | Age: 67
LOS: 1 days | Discharge: HOME | End: 2022-08-02
Attending: STUDENT IN AN ORGANIZED HEALTH CARE EDUCATION/TRAINING PROGRAM | Admitting: INTERNAL MEDICINE

## 2022-07-31 DIAGNOSIS — R26.2 DIFFICULTY WALKING: ICD-10-CM

## 2022-07-31 DIAGNOSIS — R56.9 SEIZURE: Primary | ICD-10-CM

## 2022-07-31 DIAGNOSIS — T40.2X1A OPIOID OVERDOSE, ACCIDENTAL OR UNINTENTIONAL, INITIAL ENCOUNTER: ICD-10-CM

## 2022-07-31 LAB
ALBUMIN SERPL-MCNC: 4.4 G/DL (ref 3.5–5.2)
ALBUMIN/GLOB SERPL: 1.3 G/DL
ALP SERPL-CCNC: 119 U/L (ref 39–117)
ALT SERPL W P-5'-P-CCNC: 21 U/L (ref 1–41)
ANION GAP SERPL CALCULATED.3IONS-SCNC: 18.4 MMOL/L (ref 5–15)
APAP SERPL-MCNC: <5 MCG/ML (ref 0–30)
AST SERPL-CCNC: 49 U/L (ref 1–40)
BASOPHILS # BLD AUTO: 0.03 10*3/MM3 (ref 0–0.2)
BASOPHILS NFR BLD AUTO: 0.7 % (ref 0–1.5)
BILIRUB SERPL-MCNC: 0.5 MG/DL (ref 0–1.2)
BUN SERPL-MCNC: 13 MG/DL (ref 8–23)
BUN/CREAT SERPL: 15.1 (ref 7–25)
CALCIUM SPEC-SCNC: 9.8 MG/DL (ref 8.6–10.5)
CHLORIDE SERPL-SCNC: 91 MMOL/L (ref 98–107)
CO2 SERPL-SCNC: 21.6 MMOL/L (ref 22–29)
CREAT SERPL-MCNC: 0.86 MG/DL (ref 0.76–1.27)
DEPRECATED RDW RBC AUTO: 44.4 FL (ref 37–54)
EGFRCR SERPLBLD CKD-EPI 2021: 94.3 ML/MIN/1.73
EOSINOPHIL # BLD AUTO: 0.03 10*3/MM3 (ref 0–0.4)
EOSINOPHIL NFR BLD AUTO: 0.7 % (ref 0.3–6.2)
ERYTHROCYTE [DISTWIDTH] IN BLOOD BY AUTOMATED COUNT: 13.6 % (ref 12.3–15.4)
ETHANOL BLD-MCNC: <10 MG/DL (ref 0–10)
ETHANOL UR QL: <0.01 %
GLOBULIN UR ELPH-MCNC: 3.5 GM/DL
GLUCOSE SERPL-MCNC: 104 MG/DL (ref 65–99)
HCT VFR BLD AUTO: 38.2 % (ref 37.5–51)
HGB BLD-MCNC: 13 G/DL (ref 13–17.7)
HOLD SPECIMEN: NORMAL
HOLD SPECIMEN: NORMAL
IMM GRANULOCYTES # BLD AUTO: 0.01 10*3/MM3 (ref 0–0.05)
IMM GRANULOCYTES NFR BLD AUTO: 0.2 % (ref 0–0.5)
INR PPP: 0.94 (ref 0.86–1.15)
LYMPHOCYTES # BLD AUTO: 0.82 10*3/MM3 (ref 0.7–3.1)
LYMPHOCYTES NFR BLD AUTO: 19.5 % (ref 19.6–45.3)
MCH RBC QN AUTO: 30.7 PG (ref 26.6–33)
MCHC RBC AUTO-ENTMCNC: 34 G/DL (ref 31.5–35.7)
MCV RBC AUTO: 90.3 FL (ref 79–97)
MONOCYTES # BLD AUTO: 0.36 10*3/MM3 (ref 0.1–0.9)
MONOCYTES NFR BLD AUTO: 8.6 % (ref 5–12)
NEUTROPHILS NFR BLD AUTO: 2.96 10*3/MM3 (ref 1.7–7)
NEUTROPHILS NFR BLD AUTO: 70.3 % (ref 42.7–76)
NRBC BLD AUTO-RTO: 0 /100 WBC (ref 0–0.2)
PLATELET # BLD AUTO: 337 10*3/MM3 (ref 140–450)
PMV BLD AUTO: 8.3 FL (ref 6–12)
POTASSIUM SERPL-SCNC: 4.7 MMOL/L (ref 3.5–5.2)
PROT SERPL-MCNC: 7.9 G/DL (ref 6–8.5)
PROTHROMBIN TIME: 12.7 SECONDS (ref 11.8–14.9)
RBC # BLD AUTO: 4.23 10*6/MM3 (ref 4.14–5.8)
SALICYLATES SERPL-MCNC: <0.3 MG/DL
SODIUM SERPL-SCNC: 131 MMOL/L (ref 136–145)
T4 FREE SERPL-MCNC: 1.5 NG/DL (ref 0.93–1.7)
TSH SERPL DL<=0.05 MIU/L-ACNC: 0.41 UIU/ML (ref 0.27–4.2)
WBC NRBC COR # BLD: 4.21 10*3/MM3 (ref 3.4–10.8)
WHOLE BLOOD HOLD COAG: NORMAL
WHOLE BLOOD HOLD SPECIMEN: NORMAL

## 2022-07-31 PROCEDURE — 99285 EMERGENCY DEPT VISIT HI MDM: CPT

## 2022-07-31 PROCEDURE — 84439 ASSAY OF FREE THYROXINE: CPT | Performed by: STUDENT IN AN ORGANIZED HEALTH CARE EDUCATION/TRAINING PROGRAM

## 2022-07-31 PROCEDURE — 82077 ASSAY SPEC XCP UR&BREATH IA: CPT | Performed by: STUDENT IN AN ORGANIZED HEALTH CARE EDUCATION/TRAINING PROGRAM

## 2022-07-31 PROCEDURE — 70450 CT HEAD/BRAIN W/O DYE: CPT

## 2022-07-31 PROCEDURE — 85610 PROTHROMBIN TIME: CPT | Performed by: STUDENT IN AN ORGANIZED HEALTH CARE EDUCATION/TRAINING PROGRAM

## 2022-07-31 PROCEDURE — 93005 ELECTROCARDIOGRAM TRACING: CPT | Performed by: STUDENT IN AN ORGANIZED HEALTH CARE EDUCATION/TRAINING PROGRAM

## 2022-07-31 PROCEDURE — 71045 X-RAY EXAM CHEST 1 VIEW: CPT

## 2022-07-31 PROCEDURE — 84443 ASSAY THYROID STIM HORMONE: CPT | Performed by: STUDENT IN AN ORGANIZED HEALTH CARE EDUCATION/TRAINING PROGRAM

## 2022-07-31 PROCEDURE — 85025 COMPLETE CBC W/AUTO DIFF WBC: CPT | Performed by: STUDENT IN AN ORGANIZED HEALTH CARE EDUCATION/TRAINING PROGRAM

## 2022-07-31 PROCEDURE — 80053 COMPREHEN METABOLIC PANEL: CPT | Performed by: STUDENT IN AN ORGANIZED HEALTH CARE EDUCATION/TRAINING PROGRAM

## 2022-07-31 PROCEDURE — 80179 DRUG ASSAY SALICYLATE: CPT | Performed by: STUDENT IN AN ORGANIZED HEALTH CARE EDUCATION/TRAINING PROGRAM

## 2022-07-31 PROCEDURE — U0005 INFEC AGEN DETEC AMPLI PROBE: HCPCS | Performed by: STUDENT IN AN ORGANIZED HEALTH CARE EDUCATION/TRAINING PROGRAM

## 2022-07-31 PROCEDURE — 99223 1ST HOSP IP/OBS HIGH 75: CPT | Performed by: HOSPITALIST

## 2022-07-31 PROCEDURE — 80143 DRUG ASSAY ACETAMINOPHEN: CPT | Performed by: STUDENT IN AN ORGANIZED HEALTH CARE EDUCATION/TRAINING PROGRAM

## 2022-07-31 PROCEDURE — 25010000002 ONDANSETRON PER 1 MG: Performed by: STUDENT IN AN ORGANIZED HEALTH CARE EDUCATION/TRAINING PROGRAM

## 2022-07-31 PROCEDURE — U0004 COV-19 TEST NON-CDC HGH THRU: HCPCS | Performed by: STUDENT IN AN ORGANIZED HEALTH CARE EDUCATION/TRAINING PROGRAM

## 2022-07-31 RX ORDER — PRAVASTATIN SODIUM 40 MG
40 TABLET ORAL DAILY
Status: DISCONTINUED | OUTPATIENT
Start: 2022-08-01 | End: 2022-08-02 | Stop reason: HOSPADM

## 2022-07-31 RX ORDER — ACETAMINOPHEN 160 MG/5ML
650 SOLUTION ORAL EVERY 4 HOURS PRN
Status: DISCONTINUED | OUTPATIENT
Start: 2022-07-31 | End: 2022-08-02 | Stop reason: HOSPADM

## 2022-07-31 RX ORDER — SPIRONOLACTONE 25 MG/1
25 TABLET ORAL DAILY
Status: DISCONTINUED | OUTPATIENT
Start: 2022-08-01 | End: 2022-08-02 | Stop reason: HOSPADM

## 2022-07-31 RX ORDER — ZOLPIDEM TARTRATE 5 MG/1
10 TABLET ORAL NIGHTLY PRN
Status: DISCONTINUED | OUTPATIENT
Start: 2022-07-31 | End: 2022-08-02 | Stop reason: HOSPADM

## 2022-07-31 RX ORDER — ZOLPIDEM TARTRATE 10 MG/1
10 TABLET ORAL NIGHTLY PRN
COMMUNITY
Start: 2022-06-30

## 2022-07-31 RX ORDER — CLOPIDOGREL BISULFATE 75 MG/1
75 TABLET ORAL DAILY
Status: DISCONTINUED | OUTPATIENT
Start: 2022-08-01 | End: 2022-08-02 | Stop reason: HOSPADM

## 2022-07-31 RX ORDER — CHOLECALCIFEROL (VITAMIN D3) 125 MCG
5 CAPSULE ORAL NIGHTLY PRN
Status: DISCONTINUED | OUTPATIENT
Start: 2022-07-31 | End: 2022-08-02 | Stop reason: HOSPADM

## 2022-07-31 RX ORDER — ACETAMINOPHEN 325 MG/1
650 TABLET ORAL EVERY 4 HOURS PRN
Status: DISCONTINUED | OUTPATIENT
Start: 2022-07-31 | End: 2022-08-02 | Stop reason: HOSPADM

## 2022-07-31 RX ORDER — BISACODYL 10 MG
10 SUPPOSITORY, RECTAL RECTAL DAILY PRN
Status: DISCONTINUED | OUTPATIENT
Start: 2022-07-31 | End: 2022-08-02 | Stop reason: HOSPADM

## 2022-07-31 RX ORDER — HYDROCODONE BITARTRATE AND ACETAMINOPHEN 10; 325 MG/1; MG/1
1 TABLET ORAL EVERY 6 HOURS PRN
COMMUNITY
Start: 2022-06-01

## 2022-07-31 RX ORDER — POLYETHYLENE GLYCOL 3350 17 G/17G
17 POWDER, FOR SOLUTION ORAL DAILY PRN
Status: DISCONTINUED | OUTPATIENT
Start: 2022-07-31 | End: 2022-08-02 | Stop reason: HOSPADM

## 2022-07-31 RX ORDER — SPIRONOLACTONE 25 MG/1
25 TABLET ORAL DAILY
COMMUNITY

## 2022-07-31 RX ORDER — ACETAMINOPHEN 650 MG/1
650 SUPPOSITORY RECTAL EVERY 4 HOURS PRN
Status: DISCONTINUED | OUTPATIENT
Start: 2022-07-31 | End: 2022-08-02 | Stop reason: HOSPADM

## 2022-07-31 RX ORDER — AMOXICILLIN 250 MG
2 CAPSULE ORAL 2 TIMES DAILY
Status: DISCONTINUED | OUTPATIENT
Start: 2022-07-31 | End: 2022-08-02 | Stop reason: HOSPADM

## 2022-07-31 RX ORDER — NITROGLYCERIN 0.4 MG/1
0.4 TABLET SUBLINGUAL
Status: DISCONTINUED | OUTPATIENT
Start: 2022-07-31 | End: 2022-08-02 | Stop reason: HOSPADM

## 2022-07-31 RX ORDER — GABAPENTIN 400 MG/1
400 CAPSULE ORAL EVERY 8 HOURS SCHEDULED
Status: DISCONTINUED | OUTPATIENT
Start: 2022-07-31 | End: 2022-08-02 | Stop reason: HOSPADM

## 2022-07-31 RX ORDER — HYDROCODONE BITARTRATE AND ACETAMINOPHEN 5; 325 MG/1; MG/1
1 TABLET ORAL EVERY 4 HOURS PRN
Status: DISCONTINUED | OUTPATIENT
Start: 2022-07-31 | End: 2022-08-01

## 2022-07-31 RX ORDER — ENOXAPARIN SODIUM 100 MG/ML
40 INJECTION SUBCUTANEOUS EVERY 24 HOURS
Status: DISCONTINUED | OUTPATIENT
Start: 2022-08-01 | End: 2022-08-02 | Stop reason: HOSPADM

## 2022-07-31 RX ORDER — FUROSEMIDE 40 MG/1
40 TABLET ORAL DAILY
Status: DISCONTINUED | OUTPATIENT
Start: 2022-08-01 | End: 2022-08-02 | Stop reason: HOSPADM

## 2022-07-31 RX ORDER — ACETAMINOPHEN 500 MG
1000 TABLET ORAL 3 TIMES DAILY
Status: DISCONTINUED | OUTPATIENT
Start: 2022-07-31 | End: 2022-08-01

## 2022-07-31 RX ORDER — BISACODYL 5 MG/1
5 TABLET, DELAYED RELEASE ORAL DAILY PRN
Status: DISCONTINUED | OUTPATIENT
Start: 2022-07-31 | End: 2022-08-02 | Stop reason: HOSPADM

## 2022-07-31 RX ORDER — PRAVASTATIN SODIUM 40 MG
40 TABLET ORAL DAILY
COMMUNITY
Start: 2022-05-08

## 2022-07-31 RX ORDER — FUROSEMIDE 40 MG/1
40 TABLET ORAL
COMMUNITY

## 2022-07-31 RX ORDER — GABAPENTIN 800 MG/1
800 TABLET ORAL 4 TIMES DAILY
Status: ON HOLD | COMMUNITY
Start: 2022-06-01 | End: 2022-08-02 | Stop reason: SDUPTHER

## 2022-07-31 RX ORDER — ONDANSETRON 4 MG/1
4 TABLET, FILM COATED ORAL EVERY 6 HOURS PRN
Status: DISCONTINUED | OUTPATIENT
Start: 2022-07-31 | End: 2022-08-02 | Stop reason: HOSPADM

## 2022-07-31 RX ORDER — SODIUM CHLORIDE 9 MG/ML
40 INJECTION, SOLUTION INTRAVENOUS AS NEEDED
Status: DISCONTINUED | OUTPATIENT
Start: 2022-07-31 | End: 2022-08-02 | Stop reason: HOSPADM

## 2022-07-31 RX ORDER — LISINOPRIL 20 MG/1
20 TABLET ORAL DAILY
Status: DISCONTINUED | OUTPATIENT
Start: 2022-08-01 | End: 2022-08-02 | Stop reason: HOSPADM

## 2022-07-31 RX ORDER — SODIUM CHLORIDE 0.9 % (FLUSH) 0.9 %
10 SYRINGE (ML) INJECTION AS NEEDED
Status: DISCONTINUED | OUTPATIENT
Start: 2022-07-31 | End: 2022-07-31

## 2022-07-31 RX ORDER — SODIUM CHLORIDE 0.9 % (FLUSH) 0.9 %
10 SYRINGE (ML) INJECTION AS NEEDED
Status: DISCONTINUED | OUTPATIENT
Start: 2022-07-31 | End: 2022-08-02 | Stop reason: HOSPADM

## 2022-07-31 RX ORDER — LISINOPRIL 20 MG/1
20 TABLET ORAL DAILY
COMMUNITY
Start: 2022-02-10

## 2022-07-31 RX ORDER — LORAZEPAM 2 MG/ML
2 INJECTION INTRAMUSCULAR
Status: DISCONTINUED | OUTPATIENT
Start: 2022-07-31 | End: 2022-08-02 | Stop reason: HOSPADM

## 2022-07-31 RX ORDER — CLOPIDOGREL BISULFATE 75 MG/1
75 TABLET ORAL DAILY
COMMUNITY

## 2022-07-31 RX ORDER — SODIUM CHLORIDE 0.9 % (FLUSH) 0.9 %
10 SYRINGE (ML) INJECTION EVERY 12 HOURS SCHEDULED
Status: DISCONTINUED | OUTPATIENT
Start: 2022-07-31 | End: 2022-08-02 | Stop reason: HOSPADM

## 2022-07-31 RX ORDER — ONDANSETRON 2 MG/ML
4 INJECTION INTRAMUSCULAR; INTRAVENOUS EVERY 6 HOURS PRN
Status: DISCONTINUED | OUTPATIENT
Start: 2022-07-31 | End: 2022-08-02 | Stop reason: HOSPADM

## 2022-07-31 RX ORDER — ONDANSETRON 2 MG/ML
4 INJECTION INTRAMUSCULAR; INTRAVENOUS ONCE
Status: COMPLETED | OUTPATIENT
Start: 2022-07-31 | End: 2022-07-31

## 2022-07-31 RX ORDER — MULTIPLE VITAMINS W/ MINERALS TAB 9MG-400MCG
1 TAB ORAL DAILY
COMMUNITY

## 2022-07-31 RX ADMIN — ONDANSETRON 4 MG: 2 INJECTION INTRAMUSCULAR; INTRAVENOUS at 21:45

## 2022-08-01 ENCOUNTER — APPOINTMENT (OUTPATIENT)
Dept: MRI IMAGING | Facility: HOSPITAL | Age: 67
End: 2022-08-01

## 2022-08-01 ENCOUNTER — APPOINTMENT (OUTPATIENT)
Dept: NEUROLOGY | Facility: HOSPITAL | Age: 67
End: 2022-08-01

## 2022-08-01 PROBLEM — G92.9 ENCEPHALOPATHY, TOXIC: Status: ACTIVE | Noted: 2022-08-01

## 2022-08-01 LAB
AMPHET+METHAMPHET UR QL: NEGATIVE
ANION GAP SERPL CALCULATED.3IONS-SCNC: 16 MMOL/L (ref 5–15)
BARBITURATES UR QL SCN: NEGATIVE
BASOPHILS # BLD AUTO: 0.01 10*3/MM3 (ref 0–0.2)
BASOPHILS NFR BLD AUTO: 0.1 % (ref 0–1.5)
BENZODIAZ UR QL SCN: NEGATIVE
BUN SERPL-MCNC: 11 MG/DL (ref 8–23)
BUN/CREAT SERPL: 14.7 (ref 7–25)
CALCIUM SPEC-SCNC: 10 MG/DL (ref 8.6–10.5)
CANNABINOIDS SERPL QL: NEGATIVE
CHLORIDE SERPL-SCNC: 90 MMOL/L (ref 98–107)
CO2 SERPL-SCNC: 28 MMOL/L (ref 22–29)
COCAINE UR QL: NEGATIVE
CREAT SERPL-MCNC: 0.75 MG/DL (ref 0.76–1.27)
DEPRECATED RDW RBC AUTO: 45.1 FL (ref 37–54)
EGFRCR SERPLBLD CKD-EPI 2021: 98.3 ML/MIN/1.73
EOSINOPHIL # BLD AUTO: 0 10*3/MM3 (ref 0–0.4)
EOSINOPHIL NFR BLD AUTO: 0 % (ref 0.3–6.2)
ERYTHROCYTE [DISTWIDTH] IN BLOOD BY AUTOMATED COUNT: 13.6 % (ref 12.3–15.4)
GLUCOSE SERPL-MCNC: 110 MG/DL (ref 65–99)
HCT VFR BLD AUTO: 39.9 % (ref 37.5–51)
HGB BLD-MCNC: 13.6 G/DL (ref 13–17.7)
IMM GRANULOCYTES # BLD AUTO: 0.02 10*3/MM3 (ref 0–0.05)
IMM GRANULOCYTES NFR BLD AUTO: 0.3 % (ref 0–0.5)
LYMPHOCYTES # BLD AUTO: 0.57 10*3/MM3 (ref 0.7–3.1)
LYMPHOCYTES NFR BLD AUTO: 7.3 % (ref 19.6–45.3)
MAGNESIUM SERPL-MCNC: 2.2 MG/DL (ref 1.6–2.4)
MCH RBC QN AUTO: 30.7 PG (ref 26.6–33)
MCHC RBC AUTO-ENTMCNC: 34.1 G/DL (ref 31.5–35.7)
MCV RBC AUTO: 90.1 FL (ref 79–97)
METHADONE UR QL SCN: NEGATIVE
MONOCYTES # BLD AUTO: 0.42 10*3/MM3 (ref 0.1–0.9)
MONOCYTES NFR BLD AUTO: 5.4 % (ref 5–12)
NEUTROPHILS NFR BLD AUTO: 6.78 10*3/MM3 (ref 1.7–7)
NEUTROPHILS NFR BLD AUTO: 86.9 % (ref 42.7–76)
NRBC BLD AUTO-RTO: 0 /100 WBC (ref 0–0.2)
OPIATES UR QL: POSITIVE
OXYCODONE UR QL SCN: NEGATIVE
PHOSPHATE SERPL-MCNC: 3.5 MG/DL (ref 2.5–4.5)
PLATELET # BLD AUTO: 380 10*3/MM3 (ref 140–450)
PMV BLD AUTO: 8.4 FL (ref 6–12)
POTASSIUM SERPL-SCNC: 3.9 MMOL/L (ref 3.5–5.2)
RBC # BLD AUTO: 4.43 10*6/MM3 (ref 4.14–5.8)
SARS-COV-2 RNA PNL SPEC NAA+PROBE: DETECTED
SODIUM SERPL-SCNC: 134 MMOL/L (ref 136–145)
WBC NRBC COR # BLD: 7.8 10*3/MM3 (ref 3.4–10.8)

## 2022-08-01 PROCEDURE — 80048 BASIC METABOLIC PNL TOTAL CA: CPT | Performed by: HOSPITALIST

## 2022-08-01 PROCEDURE — 80307 DRUG TEST PRSMV CHEM ANLYZR: CPT | Performed by: STUDENT IN AN ORGANIZED HEALTH CARE EDUCATION/TRAINING PROGRAM

## 2022-08-01 PROCEDURE — 25010000002 PIPERACILLIN SOD-TAZOBACTAM PER 1 G: Performed by: HOSPITALIST

## 2022-08-01 PROCEDURE — 25010000002 ONDANSETRON PER 1 MG: Performed by: HOSPITALIST

## 2022-08-01 PROCEDURE — 85025 COMPLETE CBC W/AUTO DIFF WBC: CPT | Performed by: HOSPITALIST

## 2022-08-01 PROCEDURE — 36415 COLL VENOUS BLD VENIPUNCTURE: CPT | Performed by: HOSPITALIST

## 2022-08-01 PROCEDURE — 83735 ASSAY OF MAGNESIUM: CPT | Performed by: HOSPITALIST

## 2022-08-01 PROCEDURE — 99222 1ST HOSP IP/OBS MODERATE 55: CPT | Performed by: PSYCHIATRY & NEUROLOGY

## 2022-08-01 PROCEDURE — 94799 UNLISTED PULMONARY SVC/PX: CPT

## 2022-08-01 PROCEDURE — 70551 MRI BRAIN STEM W/O DYE: CPT

## 2022-08-01 PROCEDURE — 95819 EEG AWAKE AND ASLEEP: CPT

## 2022-08-01 PROCEDURE — 25010000002 ENOXAPARIN PER 10 MG: Performed by: HOSPITALIST

## 2022-08-01 PROCEDURE — 99233 SBSQ HOSP IP/OBS HIGH 50: CPT | Performed by: INTERNAL MEDICINE

## 2022-08-01 PROCEDURE — 95819 EEG AWAKE AND ASLEEP: CPT | Performed by: PSYCHIATRY & NEUROLOGY

## 2022-08-01 PROCEDURE — 84100 ASSAY OF PHOSPHORUS: CPT | Performed by: HOSPITALIST

## 2022-08-01 RX ORDER — PROMETHAZINE HYDROCHLORIDE 12.5 MG/1
12.5 SUPPOSITORY RECTAL EVERY 6 HOURS PRN
Status: DISCONTINUED | OUTPATIENT
Start: 2022-08-01 | End: 2022-08-02 | Stop reason: HOSPADM

## 2022-08-01 RX ORDER — PANTOPRAZOLE SODIUM 40 MG/1
40 TABLET, DELAYED RELEASE ORAL DAILY
COMMUNITY

## 2022-08-01 RX ORDER — PROMETHAZINE HYDROCHLORIDE 12.5 MG/1
12.5 TABLET ORAL EVERY 6 HOURS PRN
Status: DISCONTINUED | OUTPATIENT
Start: 2022-08-01 | End: 2022-08-02 | Stop reason: HOSPADM

## 2022-08-01 RX ORDER — LOPERAMIDE HYDROCHLORIDE 2 MG/1
2 CAPSULE ORAL 4 TIMES DAILY PRN
Status: DISCONTINUED | OUTPATIENT
Start: 2022-08-01 | End: 2022-08-02 | Stop reason: HOSPADM

## 2022-08-01 RX ORDER — DICYCLOMINE HYDROCHLORIDE 10 MG/1
20 CAPSULE ORAL 4 TIMES DAILY PRN
Status: DISCONTINUED | OUTPATIENT
Start: 2022-08-01 | End: 2022-08-02 | Stop reason: HOSPADM

## 2022-08-01 RX ORDER — POTASSIUM CHLORIDE 750 MG/1
10 CAPSULE, EXTENDED RELEASE ORAL DAILY
COMMUNITY
Start: 2022-05-08

## 2022-08-01 RX ORDER — PANTOPRAZOLE SODIUM 40 MG/1
40 TABLET, DELAYED RELEASE ORAL DAILY
Status: DISCONTINUED | OUTPATIENT
Start: 2022-08-01 | End: 2022-08-02 | Stop reason: HOSPADM

## 2022-08-01 RX ORDER — CARVEDILOL 3.12 MG/1
3.12 TABLET ORAL 2 TIMES DAILY WITH MEALS
COMMUNITY

## 2022-08-01 RX ORDER — HYDROCODONE BITARTRATE AND ACETAMINOPHEN 10; 325 MG/1; MG/1
1 TABLET ORAL EVERY 4 HOURS PRN
Status: DISCONTINUED | OUTPATIENT
Start: 2022-08-01 | End: 2022-08-02 | Stop reason: HOSPADM

## 2022-08-01 RX ORDER — HYDRALAZINE HYDROCHLORIDE 20 MG/ML
10 INJECTION INTRAMUSCULAR; INTRAVENOUS EVERY 6 HOURS PRN
Status: DISCONTINUED | OUTPATIENT
Start: 2022-08-01 | End: 2022-08-02 | Stop reason: HOSPADM

## 2022-08-01 RX ORDER — CARVEDILOL 3.12 MG/1
3.12 TABLET ORAL 2 TIMES DAILY WITH MEALS
Status: DISCONTINUED | OUTPATIENT
Start: 2022-08-01 | End: 2022-08-02 | Stop reason: HOSPADM

## 2022-08-01 RX ADMIN — SPIRONOLACTONE 25 MG: 25 TABLET ORAL at 13:06

## 2022-08-01 RX ADMIN — CARVEDILOL 3.12 MG: 3.12 TABLET, FILM COATED ORAL at 09:57

## 2022-08-01 RX ADMIN — ENOXAPARIN SODIUM 40 MG: 100 INJECTION SUBCUTANEOUS at 02:12

## 2022-08-01 RX ADMIN — PRAVASTATIN SODIUM 40 MG: 40 TABLET ORAL at 11:03

## 2022-08-01 RX ADMIN — SENNOSIDES AND DOCUSATE SODIUM 2 TABLET: 50; 8.6 TABLET ORAL at 09:57

## 2022-08-01 RX ADMIN — CLOPIDOGREL BISULFATE 75 MG: 75 TABLET ORAL at 09:59

## 2022-08-01 RX ADMIN — FUROSEMIDE 40 MG: 40 TABLET ORAL at 09:57

## 2022-08-01 RX ADMIN — ONDANSETRON 4 MG: 2 INJECTION INTRAMUSCULAR; INTRAVENOUS at 17:17

## 2022-08-01 RX ADMIN — GABAPENTIN 400 MG: 400 CAPSULE ORAL at 22:29

## 2022-08-01 RX ADMIN — LISINOPRIL 20 MG: 20 TABLET ORAL at 09:58

## 2022-08-01 RX ADMIN — Medication 10 ML: at 09:59

## 2022-08-01 RX ADMIN — ACETAMINOPHEN 1000 MG: 500 TABLET ORAL at 09:57

## 2022-08-01 RX ADMIN — TAZOBACTAM SODIUM AND PIPERACILLIN SODIUM 3.38 G: 375; 3 INJECTION, SOLUTION INTRAVENOUS at 09:56

## 2022-08-01 RX ADMIN — TAZOBACTAM SODIUM AND PIPERACILLIN SODIUM 3.38 G: 375; 3 INJECTION, SOLUTION INTRAVENOUS at 17:17

## 2022-08-01 RX ADMIN — ONDANSETRON 4 MG: 2 INJECTION INTRAMUSCULAR; INTRAVENOUS at 01:44

## 2022-08-01 RX ADMIN — ONDANSETRON 4 MG: 2 INJECTION INTRAMUSCULAR; INTRAVENOUS at 10:16

## 2022-08-01 RX ADMIN — Medication 10 ML: at 20:37

## 2022-08-01 RX ADMIN — ACETAMINOPHEN 1000 MG: 500 TABLET ORAL at 02:12

## 2022-08-01 RX ADMIN — Medication 10 ML: at 02:13

## 2022-08-01 RX ADMIN — SENNOSIDES AND DOCUSATE SODIUM 2 TABLET: 50; 8.6 TABLET ORAL at 20:38

## 2022-08-01 RX ADMIN — TAZOBACTAM SODIUM AND PIPERACILLIN SODIUM 3.38 G: 375; 3 INJECTION, SOLUTION INTRAVENOUS at 01:47

## 2022-08-01 RX ADMIN — ENOXAPARIN SODIUM 40 MG: 100 INJECTION SUBCUTANEOUS at 23:47

## 2022-08-01 RX ADMIN — TAZOBACTAM SODIUM AND PIPERACILLIN SODIUM 3.38 G: 375; 3 INJECTION, SOLUTION INTRAVENOUS at 23:48

## 2022-08-01 RX ADMIN — CARVEDILOL 3.12 MG: 3.12 TABLET, FILM COATED ORAL at 17:17

## 2022-08-01 RX ADMIN — GABAPENTIN 400 MG: 400 CAPSULE ORAL at 02:12

## 2022-08-01 RX ADMIN — GABAPENTIN 400 MG: 400 CAPSULE ORAL at 15:03

## 2022-08-01 RX ADMIN — GABAPENTIN 400 MG: 400 CAPSULE ORAL at 06:15

## 2022-08-01 RX ADMIN — PANTOPRAZOLE SODIUM 40 MG: 40 TABLET, DELAYED RELEASE ORAL at 09:57

## 2022-08-01 NOTE — PROGRESS NOTES
[facility]   Hospitalist Progress Note  Date: 2022  Patient Name: Sarah Osorio  : 1955  MRN: [id number]  Date of admission: 2022      Subjective   Subjective     Chief Complaint: Seizure    Summary: 67-year-old male who presents emergency room with seizure activity.  His wife witnessed the seizure.  He lost urinary continence. He was turning blue and convulsing. Patient's wife states that he follows with pain management.  He got 120 pills of hydrocodone on Wednesday.  The bottle is now half empty.  His wife states that he takes at least 10 pills a day during the first half of the month.  He sleeps most of the day after he takes a lot of pills.  Patient used to get more pills each month.  However, he has had to go to pain management rather than getting his medications from his previous doctor.  Wife states they reduce the amount of pills to half that he used to get.  She also states he clearly abuses his medications as he will take all of his narcotics in the first 2 weeks and then be miserable the final 2 weeks of the month.  Return to the ER, he was given 6 mg of Narcan with improvement but he became combative.  He was still postictal and was admitted for further care.  Neurology was consulted.    Interval Followup: No acute events overnight.  Patient has had no further seizure-like activity since admission.  He is much more awake and alert and his confusion has resolved per his wife at bedside.  We long discussion about how the patient has been abusing his gabapentin and hydrocodone.  He currently does not think he is interested in going to drug rehab.    Review of Systems   All systems reviewed and negative unless otherwise stated under interval follow-up    Objective   Objective     Vitals:   Temp:  [98.5 °F (36.9 °C)-99.1 °F (37.3 °C)] 99.1 °F (37.3 °C)  Heart Rate:  [] 100  Resp:  [18-19] 18  BP: (151-179)/() 151/85  Physical Exam    Constitutional: Awake, alert, no acute  distress   Eyes: Pupils equal, sclerae anicteric, no conjunctival injection   HENT: NCAT, mucous membranes moist   Neck: Supple, no thyromegaly, no lymphadenopathy, trachea midline   Respiratory: Clear to auscultation bilaterally, nonlabored respirations    Cardiovascular: RRR, no murmurs, rubs, or gallops   Gastrointestinal: Positive bowel sounds, soft, nontender, nondistended   Musculoskeletal: No bilateral ankle edema, no clubbing or cyanosis to extremities   Psychiatric: Appropriate affect, cooperative   Neurologic: Oriented x 2, strength symmetric in all extremities, Cranial Nerves grossly intact to confrontation, speech clear   Skin: No rashes     Result Review    Result Review:  I have personally reviewed the results from the time of this admission to 8/1/2022 15:33 EDT and agree with these findings:  [x]  Laboratory all labs reviewed  []  Microbiology  []  Radiology  [x]  EKG/Telemetry telemetry reviewed showed normal sinus rhythm  []  Cardiology/Vascular   []  Pathology  []  Old records  []  Other:  CBC    CBC 2/10/22 7/31/22 8/1/22   WBC 8.83 4.21 7.80   RBC 4.20 (A) 4.23 4.43   Hemoglobin 13.3 (A) 13.0 13.6   Hematocrit 39.8 (A) 38.2 39.9   MCV 94.8 90.3 90.1   MCH 31.7 30.7 30.7   MCHC 33.4 34.0 34.1   RDW 13.7 13.6 13.6   Platelets 419 337 380   (A) Abnormal value            CMP    CMP 7/31/22 8/1/22   Glucose 104 (A) 110 (A)   BUN 13 11   Creatinine 0.86 0.75 (A)   Sodium 131 (A) 134 (A)   Potassium 4.7 3.9   Chloride 91 (A) 90 (A)   Calcium 9.8 10.0   Albumin 4.40    Total Bilirubin 0.5    Alkaline Phosphatase 119 (A)    AST (SGOT) 49 (A)    ALT (SGPT) 21    (A) Abnormal value       Comments are available for some flowsheets but are not being displayed.             Assessment & Plan   Assessment / Plan     Assessment/Plan:  Seizure with postictal state  Toxic encephalopathy  Intentional overdose with hydrocodone and gabapentin  Aspiration pneumonia ruled out  Chronic systolic congestive heart  failure  CAD  Hyperlipidemia  Chronic back pain  Narcotic dependence  Concern for opiate withdrawal    Continue to monitor in the hospital for management of the above  Neurology consulted, appreciate assistance  EEG pending, this was likely a provoked seizure in setting of narcotic and gabapentin overdose  No indication for antiepileptics unless EEG shows continued seizures  MRI of the brain pending  Discussed with the patient that we can offer outpatient or inpatient drug rehab but he at this point does not appear to be contemplating rehab  Will start Zofran, Phenergan, Bentyl, Imodium as needed for withdrawal symptoms  Continue appropriate home medications  Continue decreased dose of gabapentin  Trend renal function and electrolytes with a.m. BMP  Trend Hgb and WBC with a.m. CBC     Discussed plan with RN, neurology    DVT prophylaxis:  Medical DVT prophylaxis orders are present.    CODE STATUS:   Level Of Support Discussed With: Patient  Code Status (Patient has no pulse and is not breathing): CPR (Attempt to Resuscitate)  Medical Interventions (Patient has pulse or is breathing): Full Support        Electronically signed by Carrillo Banks MD, 08/01/22, 3:33 PM EDT.

## 2022-08-01 NOTE — ED NOTES
Called poison control and spoke to Monica. Instructed to get a urine drug screen along with tylenol and blood alcohol levels. Continue to monitor for at least 4 hours after last narcan administration.

## 2022-08-01 NOTE — H&P
[facility]  Date: 20[facility]   Patient Name: Sarah Osorio  : 1955  MRN: [id number]  Primary Care Physician:  Silvia Faustin, LUIS M  Date of admission: 2022    Subjective Seizure activity  Subjective     Chief Complaint: Seizure activity    HPI: Patient is a 67-year-old male who presents emergency room with seizure activity.  His wife witnessed the seizure.  He lost urinary continence. He was turning blue and convulsing.    Patient's wife states that he follows with pain management.  He got 120 pills of hydrocodone on Wednesday.  The bottle is now half empty.  His wife states that he takes at least 10 pills a day during the first half of the month.  He sleeps most of the day after he takes a lot of pills.  But the last couple of days he has been wired and awake.    Patient used to get more pills each month.  However, he has had to go to pain management rather than getting his medications from his previous doctor.  Wife states they reduce the amount of pills to half that he used to get.    On route to the ER patient got 6 mg of Narcan.  He became combative.    When I saw him he only knew that he was in the hospital.  He was not able to consistently answer any questions.  Did not know which town he was in etc.    On arrival to the ED, temperature is 98.5, pulse is 104, respiratory rate is 19, blood pressure is 175/125, he saturating 93% on room air.    CT of his head showed mild volume loss.  Moderate to severe amount of low-density in the periventricular and subcortical matter.  This is nonspecific, but most commonly seen with chronic small vessel ischemic change.  Chest x-ray showed stable opacity in the right infra parahilar lung, atelectasis versus pneumonia.    Patient's glucose was 104, sodium was 131, chloride was 91, CO2 was 21.6, AST was 49, ALT was 119.  Anion gap was 18.4.  CBC is essentially normal.    Has past medical history significant for congestive  heart failure, coronary artery disease, chronic back pain.  Patient fell off of a ladder and has had chronic pain since.  Hyperlipidemia, hypertension.  Patient has an EF around 20%.    Personal History     Past Medical History:  Past Medical History:   Diagnosis Date   • Abdominal aneurysm (HCC)    • CHF (congestive heart failure) (HCC)    • Hyperlipidemia    • Hypertension    • Injury of back    '      Past Surgical History:  Past Surgical History:   Procedure Laterality Date   • APPENDECTOMY     • CARDIAC CATHETERIZATION     • HERNIA REPAIR         Family History:   History reviewed. No pertinent family history.    Social History:   Social History     Socioeconomic History   • Marital status:    Tobacco Use   • Smoking status: Never Smoker   • Smokeless tobacco: Never Used   Vaping Use   • Vaping Use: Never used   Substance and Sexual Activity   • Alcohol use: Not Currently   • Drug use: Never         Home Medications:  HYDROcodone-acetaminophen, clopidogrel, furosemide, gabapentin, lisinopril, multivitamin with minerals, pravastatin, spironolactone, and zolpidem    Allergies:  No Known Allergies    Review of Systems   All systems were reviewed and negative except for: Able to get because of mentation    Objective   Objective     Vitals:   Temp:  [98.5 °F (36.9 °C)] 98.5 °F (36.9 °C)  Heart Rate:  [] 93  Resp:  [19] 19  BP: (151-179)/() 152/98    Physical Exam    Constitutional: Awake, alert, no acute distress   Eyes: Pupils equal, sclerae anicteric, no conjunctival injection   HENT: NCAT, mucous membranes moist   Neck: Supple, no thyromegaly, no lymphadenopathy, trachea midline   Respiratory: Clear to auscultation bilaterally, nonlabored respirations    Cardiovascular: RRR, no murmurs, rubs, or gallops, palpable pedal pulses bilaterally   Gastrointestinal: Positive bowel sounds, soft, nontender, nondistended   Musculoskeletal: No bilateral ankle edema, no clubbing or cyanosis to  extremities   Psychiatric: Appropriate affect, cooperative   Neurologic: Confused strength symmetric in all extremities, Cranial Nerves grossly intact to confrontation, speech clear   Skin: No rashes     Result Review    Result Review:  I have personally reviewed the results from the time of this admission to 7/31/2022 23:38 EDT and agree with these findings:  [x]  Laboratory  []  Microbiology  [x]  Radiology  []  EKG/Telemetry   [x]  Cardiology/Vascular   []  Pathology  [x]  Old records  []  Other:      Assessment & Plan   Assessment / Plan   #1 status post seizure with postictal state  -EEG ordered, MRI ordered  -As needed seizure medications  -Seizure prophylaxis  -Dr. Irby consulted    #2 possible aspiration pneumonia  -We will start on Zosyn empirically  -Low threshold to stop    #3 systolic CHF not in acute exacerbation  -Continue Lasix, lisinopril, spironolactone, patient not on beta-blocker    #4 CAD with hyperlipidemia  -Continue Plavix, Lipitor    #5 chronic pain secondary to back injury  -History of taking too many pain meds  -We will reduce gabapentin for now.  As needed pain meds.  Hopefully mentation will improve.        DVT prophylaxis:  Medical DVT prophylaxis orders are present.    CODE STATUS:    Level Of Support Discussed With: Patient  Code Status (Patient has no pulse and is not breathing): CPR (Attempt to Resuscitate)  Medical Interventions (Patient has pulse or is breathing): Full Support      Admission Status:  I believe this patient meets observation status.    Electronically signed by Maritza Hook DO, 07/31/22, 11:38 PM EDT.

## 2022-08-01 NOTE — CONSULTS
[facility]   Neurology Consult Note    Patient Name: Sarah Osorio  : 1955  MRN: [id number]  Primary Care Physician:  Silvia Faustin APRN  Referring Physician: No ref. provider found  Date of admission: 2022    Subjective   Subjective     Reason for Consult/ Chief Complaint: New onset seizure    HPI:  Sarah Osorio is a 67 y.o. male evaluated for new onset seizure.  History is obtained from the wife.  The patient is confused and initially told me that he was aware that he was shaking and then change his story.  According to the wife the patient was sleeping around 3 PM yesterday and they heard a noise and was found to be shaking in his upper and lower extremities and stiff in his lower extremities for about a minute.  There after he was not responsive for 20 minutes and was not back to baseline for over 2 hours.  He was brought to the emergency room by EMS.  He received Narcan by EMS prior to arrival to the ER as well since his wife states that he just got his hydrocodone Wednesday and he usually overdoses himself once he gets his hydrocodone.  At the ER he was alert and oriented to person place and time.  Is mildly confused.    According to the wife the patient has been confused for over 2 years.  He does not know if this is because he has dementia because his mother had dementia or disease from his medications.  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  His wife states is independent with activities of daily living but has not driven.  He tells me he drove the last time was 6 months ago.    He does not take benzodiazepines.  He is taking Ambien half a tablet.  Does not take it all the time.  He does not drink alcohol.      Personal History     Past Medical History:   Diagnosis Date   • Abdominal  aneurysm (HCC)    • CHF (congestive heart failure) (HCC)    • Hyperlipidemia    • Hypertension    • Injury of back        Past Surgical History:   Procedure Laterality Date   • APPENDECTOMY     • CARDIAC CATHETERIZATION     • HERNIA REPAIR         Family History: family history is not on file. Otherwise pertinent FHx was reviewed and not pertinent to current issue.    Social History:  reports that he has never smoked. He has never used smokeless tobacco. He reports previous alcohol use. He reports that he does not use drugs.    Home Medications:  HYDROcodone-acetaminophen, carvedilol, clopidogrel, furosemide, gabapentin, lisinopril, multivitamin with minerals, pantoprazole, potassium chloride, pravastatin, spironolactone, and zolpidem    Allergies:  No Known Allergies    Objective    Objective     Vitals:   Temp:  [98.5 °F (36.9 °C)-99.1 °F (37.3 °C)] 99.1 °F (37.3 °C)  Heart Rate:  [] 100  Resp:  [18-19] 18  BP: (151-179)/() 151/85    Physical Exam: He is awake, alert but did not eat his lunch.  He can tell me the year as either 2021 or 2022.  He could not tell me the month.  He said it was summer and he was June.  I corrected him that it was August 1 Monday.  He was able to remember August 1 but not Monday.  He can tell me the president [place] and the previous president.  Visual fields full, EMs follow directions gaze, facial strength is full, soft palate elevation and tongue are normal.  There is no tongue bite.  There is no weakness of the upper or lower extremities grossly.  Reflexes are symmetrically decreased in the biceps, triceps, patellar's and ankles.  Station gait was not performed.      Result Review    Result Review:  I have personally reviewed the results from the time of this admission to 8/1/2022 13:33 EDT and agree with these findings:  [x]  Laboratory  []  Microbiology  [x]  Radiology  [x]  EKG/Telemetry   []  Cardiology/Vascular   []  Pathology  [x]  Old records  []   Other:      Assessment & Plan   Assessment / Plan   Active Hospital Problems:  Active Hospital Problems    Diagnosis    • Encephalopathy, toxic    • Seizure (HCC)          Plan: He has new onset seizure undetermined etiology.  I discussed with him and his wife that I will not start him on antiepileptic medication unless the EEG shows epileptiform discharges.  An EEG will be obtained today.  MRI of the brain is pending.  I discussed with him and his wife that he cannot drive for 3 months after his last seizure.  He overdosed on hydrocodone and had to be given Narcan.    He is to follow-up in our clinic in the next month to see nurse practitioner Ai Lucas.    Total time spent with the patient and coordinating patient care was 55 minutes.    electronically signed by Aidan Hoang MD, 08/01/22, 1:22 PM EDT.

## 2022-08-01 NOTE — PLAN OF CARE
Problem: Adult Inpatient Plan of Care  Goal: Plan of Care Review  Outcome: Ongoing, Progressing  Flowsheets (Taken 8/1/2022 1733)  Progress: improving  Plan of Care Reviewed With: patient  Outcome Evaluation: Pt lethargic and able to answer yes/no questions this morning. Pt has become increasingly alert and oriented throughout shift and has been AOx4 throughout rest of shift. VSS. Antibiotic therapy continued. EEG and MRI obtained on shift. Spouse has been present at bedside throughout most of shift and included in plan of care. Nausea treated per MAR. Pt has had reduced oral intake on shift due to nausea and lack of apetite. No signs of seizure-like activity on shift.  No significant changes at this time. Continuing to monitor and provide patient care.   Goal Outcome Evaluation:  Plan of Care Reviewed With: patient        Progress: improving  Outcome Evaluation: Pt lethargic and able to answer yes/no questions this morning. Pt has become increasingly alert and oriented throughout shift and has been AOx4 throughout rest of shift. VSS. Antibiotic therapy continued. EEG and MRI obtained on shift. Spouse has been present at bedside throughout most of shift and included in plan of care. Nausea treated per MAR. Pt has had reduced oral intake on shift due to nausea and lack of apetite. No signs of seizure-like activity on shift.  No significant changes at this time. Continuing to monitor and provide patient care.

## 2022-08-01 NOTE — PLAN OF CARE
Goal Outcome Evaluation:  Plan of Care Reviewed With: patient        Progress: no change       Patient admitted to this floor from ER. Alert and oriented to name, place and situation confused to time. Continues to have multiple episodes of green emesis. Denies pain at this time. Vitals stable. Will continue to monitor and update as needed.

## 2022-08-02 ENCOUNTER — READMISSION MANAGEMENT (OUTPATIENT)
Dept: CALL CENTER | Facility: HOSPITAL | Age: 67
End: 2022-08-02

## 2022-08-02 VITALS
OXYGEN SATURATION: 96 % | DIASTOLIC BLOOD PRESSURE: 63 MMHG | BODY MASS INDEX: 36.85 KG/M2 | HEART RATE: 78 BPM | TEMPERATURE: 97.9 F | SYSTOLIC BLOOD PRESSURE: 122 MMHG | HEIGHT: 71 IN | WEIGHT: 263.23 LBS | RESPIRATION RATE: 20 BRPM

## 2022-08-02 LAB
ANION GAP SERPL CALCULATED.3IONS-SCNC: 9.3 MMOL/L (ref 5–15)
BASOPHILS # BLD AUTO: 0.01 10*3/MM3 (ref 0–0.2)
BASOPHILS NFR BLD AUTO: 0.1 % (ref 0–1.5)
BUN SERPL-MCNC: 21 MG/DL (ref 8–23)
BUN/CREAT SERPL: 17.2 (ref 7–25)
CALCIUM SPEC-SCNC: 9.5 MG/DL (ref 8.6–10.5)
CHLORIDE SERPL-SCNC: 95 MMOL/L (ref 98–107)
CO2 SERPL-SCNC: 31.7 MMOL/L (ref 22–29)
CREAT SERPL-MCNC: 1.22 MG/DL (ref 0.76–1.27)
DEPRECATED RDW RBC AUTO: 45.3 FL (ref 37–54)
EGFRCR SERPLBLD CKD-EPI 2021: 64.6 ML/MIN/1.73
EOSINOPHIL # BLD AUTO: 0.01 10*3/MM3 (ref 0–0.4)
EOSINOPHIL NFR BLD AUTO: 0.1 % (ref 0.3–6.2)
ERYTHROCYTE [DISTWIDTH] IN BLOOD BY AUTOMATED COUNT: 13.6 % (ref 12.3–15.4)
GLUCOSE SERPL-MCNC: 124 MG/DL (ref 65–99)
HCT VFR BLD AUTO: 37.5 % (ref 37.5–51)
HGB BLD-MCNC: 12.7 G/DL (ref 13–17.7)
IMM GRANULOCYTES # BLD AUTO: 0.03 10*3/MM3 (ref 0–0.05)
IMM GRANULOCYTES NFR BLD AUTO: 0.3 % (ref 0–0.5)
LYMPHOCYTES # BLD AUTO: 0.86 10*3/MM3 (ref 0.7–3.1)
LYMPHOCYTES NFR BLD AUTO: 9.3 % (ref 19.6–45.3)
MAGNESIUM SERPL-MCNC: 2.3 MG/DL (ref 1.6–2.4)
MCH RBC QN AUTO: 31.1 PG (ref 26.6–33)
MCHC RBC AUTO-ENTMCNC: 33.9 G/DL (ref 31.5–35.7)
MCV RBC AUTO: 91.7 FL (ref 79–97)
MONOCYTES # BLD AUTO: 0.85 10*3/MM3 (ref 0.1–0.9)
MONOCYTES NFR BLD AUTO: 9.2 % (ref 5–12)
NEUTROPHILS NFR BLD AUTO: 7.51 10*3/MM3 (ref 1.7–7)
NEUTROPHILS NFR BLD AUTO: 81 % (ref 42.7–76)
NRBC BLD AUTO-RTO: 0 /100 WBC (ref 0–0.2)
PHOSPHATE SERPL-MCNC: 3.9 MG/DL (ref 2.5–4.5)
PLATELET # BLD AUTO: 353 10*3/MM3 (ref 140–450)
PMV BLD AUTO: 8.2 FL (ref 6–12)
POTASSIUM SERPL-SCNC: 3.9 MMOL/L (ref 3.5–5.2)
RBC # BLD AUTO: 4.09 10*6/MM3 (ref 4.14–5.8)
SODIUM SERPL-SCNC: 136 MMOL/L (ref 136–145)
WBC NRBC COR # BLD: 9.27 10*3/MM3 (ref 3.4–10.8)

## 2022-08-02 PROCEDURE — 36415 COLL VENOUS BLD VENIPUNCTURE: CPT | Performed by: HOSPITALIST

## 2022-08-02 PROCEDURE — 85025 COMPLETE CBC W/AUTO DIFF WBC: CPT | Performed by: HOSPITALIST

## 2022-08-02 PROCEDURE — 80048 BASIC METABOLIC PNL TOTAL CA: CPT | Performed by: HOSPITALIST

## 2022-08-02 PROCEDURE — 97161 PT EVAL LOW COMPLEX 20 MIN: CPT

## 2022-08-02 PROCEDURE — 83735 ASSAY OF MAGNESIUM: CPT | Performed by: HOSPITALIST

## 2022-08-02 PROCEDURE — 84100 ASSAY OF PHOSPHORUS: CPT | Performed by: HOSPITALIST

## 2022-08-02 PROCEDURE — 99239 HOSP IP/OBS DSCHRG MGMT >30: CPT | Performed by: INTERNAL MEDICINE

## 2022-08-02 PROCEDURE — 25010000002 PIPERACILLIN SOD-TAZOBACTAM PER 1 G: Performed by: HOSPITALIST

## 2022-08-02 RX ORDER — GABAPENTIN 800 MG/1
400 TABLET ORAL 4 TIMES DAILY
Start: 2022-08-02

## 2022-08-02 RX ADMIN — CARVEDILOL 3.12 MG: 3.12 TABLET, FILM COATED ORAL at 08:14

## 2022-08-02 RX ADMIN — GABAPENTIN 400 MG: 400 CAPSULE ORAL at 14:26

## 2022-08-02 RX ADMIN — TAZOBACTAM SODIUM AND PIPERACILLIN SODIUM 3.38 G: 375; 3 INJECTION, SOLUTION INTRAVENOUS at 08:14

## 2022-08-02 RX ADMIN — PRAVASTATIN SODIUM 40 MG: 40 TABLET ORAL at 09:50

## 2022-08-02 RX ADMIN — SPIRONOLACTONE 25 MG: 25 TABLET ORAL at 09:51

## 2022-08-02 RX ADMIN — CLOPIDOGREL BISULFATE 75 MG: 75 TABLET ORAL at 09:50

## 2022-08-02 RX ADMIN — PANTOPRAZOLE SODIUM 40 MG: 40 TABLET, DELAYED RELEASE ORAL at 09:50

## 2022-08-02 RX ADMIN — FUROSEMIDE 40 MG: 40 TABLET ORAL at 09:51

## 2022-08-02 RX ADMIN — LISINOPRIL 20 MG: 20 TABLET ORAL at 09:50

## 2022-08-02 RX ADMIN — Medication 10 ML: at 09:51

## 2022-08-02 RX ADMIN — GABAPENTIN 400 MG: 400 CAPSULE ORAL at 06:11

## 2022-08-02 NOTE — THERAPY EVALUATION
Acute Care - Physical Therapy Initial Evaluation   [place]     Patient Name: Sarah Osorio  : 1955  MRN: [id number]  Today's Date: 2022      Visit Dx:     ICD-10-CM ICD-9-CM   1. Seizure ([facility])  R56.9 780.39   2. Opioid overdose, accidental or unintentional, initial encounter ([facility])  T40.2X1A 965.00     E850.2   3. Difficulty walking  R26.2 719.7     Patient Active Problem List   Diagnosis   • Seizure (HCC)   • Encephalopathy, toxic     Past Medical History:   Diagnosis Date   • Abdominal aneurysm (HCC)    • CHF (congestive heart failure) ([facility])    • Hyperlipidemia    • Hypertension    • Injury of back      Past Surgical History:   Procedure Laterality Date   • APPENDECTOMY     • CARDIAC CATHETERIZATION     • HERNIA REPAIR       PT Assessment (last 12 hours)     PT Evaluation and Treatment     Row Name 22 1400          Physical Therapy Time and Intention    Document Type evaluation  -AV     Mode of Treatment individual therapy;physical therapy  -AV     Row Name 22 1400          General Information    Patient Profile Reviewed yes  -AV     Patient Observations alert;cooperative  -AV     Prior Level of Function --  Patient reports independence with ADLs. Ambulated with rolling walker. No home O2. Patient demonstrating confusion during evaluation, unsure if prior level is accurate.  -AV     Equipment Currently Used at Home walker, rolling  -AV     Existing Precautions/Restrictions fall  -AV     Row Name 22 1400          Living Environment    Current Living Arrangements home  -AV     Home Accessibility stairs to enter home;stairs within home  -AV     People in Home spouse  -AV     Row Name 22 1400          Home Main Entrance    Number of Stairs, Main Entrance two  -AV     Stair Railings, Main Entrance none  -AV     Row Name 22 1400          Stairs Within Home, Primary    Number of Stairs, Within Home, Primary none  -AV     Row Name 22 1400          Cognition    Affect/Mental  Occupational Therapy  07/28/21    Chart reviewed. [facility] called on patient this morning with plans for transfer to ICU, will defer and follow up for OT re-evaluation as able & medically appropriate.      Thank you,   Lilian Morales OTD, OTR/L Status (Cognition) confused  -AV     Orientation Status (Cognition) oriented to;person  -AV     Row Name 08/02/22 1400          Range of Motion (ROM)    Range of Motion bilateral lower extremities;ROM is WFL  -AV     Row Name 08/02/22 1400          Strength (Manual Muscle Testing)    Strength (Manual Muscle Testing) bilateral lower extremities;strength is WFL  -AV     Row Name 08/02/22 1400          Bed Mobility    Bed Mobility supine-sit  -AV     Supine-Sit Rankin (Bed Mobility) standby assist  -AV     Row Name 08/02/22 1400          Transfers    Transfers sit-stand transfer  -AV     Sit-Stand Rankin (Transfers) contact guard;verbal cues;1 person assist  -AV     Row Name 08/02/22 1400          Sit-Stand Transfer    Assistive Device (Sit-Stand Transfers) --  HHA  -AV     Row Name 08/02/22 1400          Gait/Stairs (Locomotion)    Gait/Stairs Locomotion gait/ambulation independence;gait/ambulation assistive device;distance ambulated  -AV     Rankin Level (Gait) contact guard  -AV     Assistive Device (Gait) --  HHA  -AV     Distance in Feet (Gait) 25  -AV     Row Name 08/02/22 1400          Safety Issues, Functional Mobility    Safety Issues Affecting Function (Mobility) insight into deficits/self-awareness;awareness of need for assistance;judgment  -AV     Impairments Affecting Function (Mobility) balance;cognition;endurance/activity tolerance  -AV     Cognitive Impairments, Mobility Safety/Performance insight into deficits/self-awareness;awareness, need for assistance;judgment  -AV     Row Name 08/02/22 1400          Balance    Balance Assessment standing dynamic balance  -AV     Dynamic Standing Balance contact guard  -AV     Position/Device Used, Standing Balance supported  -AV     Row Name 08/02/22 1400          Plan of Care Review    Plan of Care Reviewed With patient  -AV     Progress no change  -AV     Outcome Evaluation Patient presents with deficits in balance, endurance, transfers, and  ambulation. Patient will benefit from skilled PT services to address these mobility deficits and decrease risk of falls.  -AV     Row Name 08/02/22 1400          Therapy Assessment/Plan (PT)    Rehab Potential (PT) good, to achieve stated therapy goals  -AV     Criteria for Skilled Interventions Met (PT) yes;meets criteria  -AV     Therapy Frequency (PT) daily  -AV     Problem List (PT) problems related to;balance;cognition;mobility  -AV     Activity Limitations Related to Problem List (PT) unable to ambulate safely  -AV     Row Name 08/02/22 1400          PT Evaluation Complexity    History, PT Evaluation Complexity 1-2 personal factors and/or comorbidities  -AV     Examination of Body Systems (PT Eval Complexity) total of 4 or more elements  -AV     Clinical Presentation (PT Evaluation Complexity) stable  -AV     Clinical Decision Making (PT Evaluation Complexity) low complexity  -AV     Overall Complexity (PT Evaluation Complexity) low complexity  -AV     Row Name 08/02/22 1400          Therapy Plan Review/Discharge Plan (PT)    Therapy Plan Review (PT) evaluation/treatment results reviewed;patient  -AV     Row Name 08/02/22 1400          Physical Therapy Goals    Transfer Goal Selection (PT) transfer, PT goal 1  -AV     Gait Training Goal Selection (PT) gait training, PT goal 1  -AV     Row Name 08/02/22 1400          Transfer Goal 1 (PT)    Activity/Assistive Device (Transfer Goal 1, PT) transfers, all;walker, rolling  -AV     Sunshine Level/Cues Needed (Transfer Goal 1, PT) modified independence  -AV     Time Frame (Transfer Goal 1, PT) 10 days  -AV     Row Name 08/02/22 1400          Gait Training Goal 1 (PT)    Activity/Assistive Device (Gait Training Goal 1, PT) gait (walking locomotion);assistive device use;walker, rolling  -AV     Sunshine Level (Gait Training Goal 1, PT) modified independence  -AV     Distance (Gait Training Goal 1, PT) 100  -AV     Time Frame (Gait Training Goal 1, PT) 10 days   -AV           User Key  (r) = Recorded By, (t) = Taken By, (c) = Cosigned By    Initials Name Provider Type    AV Jimy Celeste, VALENTÍN Physical Therapist                Physical Therapy Education                 Title: PT OT SLP Therapies (In Progress)     Topic: Physical Therapy (In Progress)     Point: Mobility training (Done)     Learning Progress Summary           Patient Acceptance, E,TB, VU by AV at 8/2/2022 1426                   Point: Home exercise program (Not Started)     Learner Progress:  Not documented in this visit.          Point: Body mechanics (Done)     Learning Progress Summary           Patient Acceptance, E,TB, VU by AV at 8/2/2022 1426                   Point: Precautions (Done)     Learning Progress Summary           Patient Acceptance, E,TB, VU by AV at 8/2/2022 1426                               User Key     Initials Effective Dates Name Provider Type Discipline    AV 06/11/21 -  Jimy Celeste PT Physical Therapist PT              PT Recommendation and Plan  Anticipated Discharge Disposition (PT): home with home health  Planned Therapy Interventions (PT): balance training, bed mobility training, gait training, neuromuscular re-education, strengthening, transfer training  Therapy Frequency (PT): daily  Plan of Care Reviewed With: patient  Progress: no change  Outcome Evaluation: Patient presents with deficits in balance, endurance, transfers, and ambulation. Patient will benefit from skilled PT services to address these mobility deficits and decrease risk of falls.   Outcome Measures     Row Name 08/02/22 1400             How much help from another person do you currently need...    Turning from your back to your side while in flat bed without using bedrails? 4  -AV      Moving from lying on back to sitting on the side of a flat bed without bedrails? 3  -AV      Moving to and from a bed to a chair (including a wheelchair)? 3  -AV      Standing up from a chair using your arms (e.g.,  wheelchair, bedside chair)? 3  -AV      Climbing 3-5 steps with a railing? 3  -AV      To walk in hospital room? 3  -AV      AM-PAC 6 Clicks Score (PT) 19  -AV              Functional Assessment    Outcome Measure Options AM-PAC 6 Clicks Basic Mobility (PT)  -AV            User Key  (r) = Recorded By, (t) = Taken By, (c) = Cosigned By    Initials Name Provider Type    AV Jimy Celeste, PT Physical Therapist                 Time Calculation:    PT Charges     Row Name 08/02/22 1424             Time Calculation    PT Received On 08/02/22  -AV      PT Goal Re-Cert Due Date 08/11/22  -AV              Untimed Charges    PT Eval/Re-eval Minutes 35  -AV              Total Minutes    Untimed Charges Total Minutes 35  -AV       Total Minutes 35  -AV            User Key  (r) = Recorded By, (t) = Taken By, (c) = Cosigned By    Initials Name Provider Type    AV Jimy Celeste, PT Physical Therapist              Therapy Charges for Today     Code Description Service Date Service Provider Modifiers Qty    [id number] HC PT EVAL LOW COMPLEXITY 3 8/2/2022 Jimy Celeste, PT GP 1          PT G-Codes  Outcome Measure Options: AM-PAC 6 Clicks Basic Mobility (PT)  AM-PAC 6 Clicks Score (PT): 19    Jimy Celeste, PT  8/2/2022

## 2022-08-02 NOTE — PLAN OF CARE
Goal Outcome Evaluation:              Outcome Evaluation: Pt lethargic but able to answer questions and take po medication. no c/o n/v, no seizure like activity this shift. VSS no s/ of distress or SOB.

## 2022-08-02 NOTE — DISCHARGE SUMMARY
[facility]         HOSPITALIST  DISCHARGE SUMMARY    Patient Name: Sarah Osorio  : 1955  MRN: [id number]    Date of Admission: 2022  Date of Discharge: 2022  Primary Care Physician: Silvia Faustin APRN    Consults     Date and Time Order Name Status Description    2022  8:29 AM Inpatient Neurology Consult General Completed     2022 10:41 PM IP General Consult (Use specialty-specific consult if known)            Active and Resolved Hospital Problems:  Active Hospital Problems    Diagnosis POA   • Encephalopathy, toxic [G92.9] Unknown   • Seizure (HCC) [R56.9] Yes      Resolved Hospital Problems   No resolved problems to display.   Moderate small vessel ischemic changes in the white matter  Chronic lacunar infarcts in the bilateral basal ganglia  Seizure with postictal state  Toxic encephalopathy  Intentional overdose with hydrocodone and gabapentin  Aspiration pneumonia ruled out  Chronic systolic congestive heart failure  CAD  Hyperlipidemia  Chronic back pain  Narcotic dependence  Concern for opiate withdrawal    Hospital Course     Hospital Course:  Sarah Osorio is a 67 y.o. male PMH chronic back pain, HLD, CAD, CHF who presented to the ED with seizure activity.  His wife witnessed the seizure.  He was turning blue, convulsing, and had urinary incontinence. Patient's wife states that he follows with pain management.  He got 120 pills of hydrocodone on Wednesday and the bottle is already half empty.  His wife states that he takes at least 10 pills a day during the first half of the month, and then no longer has pills for the second half of the month.  He sleeps most of the day after he takes a lot of pills.  Wife states they reduce the amount of pills to half that he used to get.  She also states he clearly abuses his medications as he will take all of his narcotics in the first 2 weeks and then be miserable the final 2 weeks of the month.  Return to the ER, he was  given 6 mg of Narcan with improvement but he became combative. He was still postictal and was admitted for further care.  Neurology was consulted.  It was felt that this was a provoked seizure due to narcotic abuse so antiepileptics were held.  EEG negative for further seizure.  MRI showed moderate small vessel ischemic changes as well as chronic lacunar infarcts in the bilateral basal ganglia.  Had a long and serious discussion about the patient's narcotic abuse.  He is in the precontemplative phase and did not have any interest in pursuing outpatient or inpatient drug rehab.  I recommend that the prescriber of his pain medications wean him off of such high amounts of narcotics as he is abusing them.  I did decrease his gabapentin dose from 800 to 400 mg, but as he already has a prescription at home, it is unclear if he will follow this dose adjustment.  His wife will attempt to keep his medications locked up and administer them on the appropriate schedule, but it is also unclear if the patient will allow this.  Patient was discharged home in stable condition on 8/2/2022.  Recommend follow-up PCP in 1 week, pain management as scheduled.    Day of Discharge     Vital Signs:  Temp:  [97.3 °F (36.3 °C)-99 °F (37.2 °C)] 97.9 °F (36.6 °C)  Heart Rate:  [] 78  Resp:  [18-20] 20  BP: (111-175)/(63-90) 122/63  Physical Exam:   Gen: NAD, WDWN  ENT: PERRL, EOMI   CV: RRR no MRG  Pulm: CTAB, no w/r/r  GI: Abd soft, NTND, +bs  Neuro: Moving all extremities spontaneously, CN II-XII grossly intact   Psych: A&O*2, normal mood and affect  Skin: No lesions or rashes noted      Discharge Details        Discharge Medications      Changes to Medications      Instructions Start Date   gabapentin 800 MG tablet  Commonly known as: NEURONTIN  What changed: how much to take   400 mg, Oral, 4 Times Daily         Continue These Medications      Instructions Start Date   carvedilol 3.125 MG tablet  Commonly known as: COREG   3.125 mg,  Oral, 2 Times Daily With Meals      clopidogrel 75 MG tablet  Commonly known as: PLAVIX   75 mg, Oral, Daily      furosemide 40 MG tablet  Commonly known as: LASIX   40 mg, Oral      HYDROcodone-acetaminophen  MG per tablet  Commonly known as: NORCO   1 tablet, Oral, Every 6 Hours PRN      lisinopril 20 MG tablet  Commonly known as: PRINIVIL,ZESTRIL   20 mg, Oral, Daily      multivitamin with minerals tablet tablet   1 tablet, Oral, Daily      pantoprazole 40 MG EC tablet  Commonly known as: PROTONIX   40 mg, Oral, Daily      potassium chloride 10 MEQ CR capsule  Commonly known as: MICRO-K   10 mEq, Oral, Daily      pravastatin 40 MG tablet  Commonly known as: PRAVACHOL   40 mg, Oral, Daily      spironolactone 25 MG tablet  Commonly known as: ALDACTONE   25 mg, Oral, Daily      zolpidem 10 MG tablet  Commonly known as: AMBIEN   10 mg, Oral, Nightly PRN             No Known Allergies    Discharge Disposition:  Home or Self Care    Diet:  Hospital:  Diet Order   Procedures   • Diet Regular       Discharge Activity:   Activity Instructions     Activity as Tolerated            CODE STATUS:  Code Status and Medical Interventions:   Ordered at: 07/31/22 5019     Level Of Support Discussed With:    Patient     Code Status (Patient has no pulse and is not breathing):    CPR (Attempt to Resuscitate)     Medical Interventions (Patient has pulse or is breathing):    Full Support         No future appointments.    Additional Instructions for the Follow-ups that You Need to Schedule     Discharge Follow-up with PCP   As directed       Currently Documented PCP:    Silvia Faustin APRN    PCP Phone Number:    [phone]     Follow Up Details: 3-5 days         Discharge Follow-up with Specified Provider: Pain management; 3 Weeks   As directed      To: Pain management    Follow Up: 3 Weeks               Pertinent  and/or Most Recent Results     PROCEDURES:   None    LAB RESULTS:      Lab 08/02/22  0416 08/01/22  0419  07/31/22  1846   WBC 9.27 7.80 4.21   HEMOGLOBIN 12.7* 13.6 13.0   HEMATOCRIT 37.5 39.9 38.2   PLATELETS 353 380 337   NEUTROS ABS 7.51* 6.78 2.96   IMMATURE GRANS (ABS) 0.03 0.02 0.01   LYMPHS ABS 0.86 0.57* 0.82   MONOS ABS 0.85 0.42 0.36   EOS ABS 0.01 0.00 0.03   MCV 91.7 90.1 90.3   PROTIME  --   --  12.7         Lab 08/02/22  0416 08/01/22  0411 07/31/22 1846   SODIUM 136 134* 131*   POTASSIUM 3.9 3.9 4.7   CHLORIDE 95* 90* 91*   CO2 31.7* 28.0 21.6*   ANION GAP 9.3 16.0* 18.4*   BUN 21 11 13   CREATININE 1.22 0.75* 0.86   EGFR 64.6 98.3 94.3   GLUCOSE 124* 110* 104*   CALCIUM 9.5 10.0 9.8   MAGNESIUM 2.3 2.2  --    PHOSPHORUS 3.9 3.5  --    TSH  --   --  0.407         Lab 07/31/22 1846   TOTAL PROTEIN 7.9   ALBUMIN 4.40   GLOBULIN 3.5   ALT (SGPT) 21   AST (SGOT) 49*   BILIRUBIN 0.5   ALK PHOS 119*         Lab 07/31/22  1846   PROTIME 12.7   INR 0.94                 Brief Urine Lab Results     None        Microbiology Results (last 10 days)     Procedure Component Value - Date/Time    COVID-19,APTIMA PANTHER(KADEEM), CAREN/ FLOYD, NP/OP SWAB IN UTM/VTM/SALINE TRANSPORT MEDIA,24 HR TAT - Swab, Nasopharynx [[id number]]  (Abnormal) Collected: 07/31/22 2125    Lab Status: Final result Specimen: Swab from Nasopharynx Updated: 08/01/22 0705     COVID19 Detected    Narrative:      Fact sheet for providers: https://www.fda.gov/media/192990/download     Fact sheet for patients: https://www.fda.gov/media/454665/download    Test performed by RT PCR.          CT Head Without Contrast    Result Date: 7/31/2022  Impression:  No acute intracranial abnormality on head CT. Mild volume loss. Moderate to severe amount of low-density in the periventricular and subcortical white matter.  This is nonspecific, but most commonly seen with chronic small vessel ischemic change.     DERIK CARDONA MD       Electronically Signed and Approved By: DERIK CARDONA MD on 7/31/2022 at 20:42             MRI Brain Without Contrast    Result Date:  8/1/2022  Impression:   1. Moderate small vessel ischemic changes in the white matter 2. Chronic lacunar infarcts in the bilateral basal ganglia 3. No acute infarction or intracranial hemorrhage      Too Holley M.D.       Electronically Signed and Approved By: Too Holley M.D. on 8/01/2022 at 16:22             XR Chest 1 View    Result Date: 7/31/2022  Impression:  Slight thickening of the pulmonary vessels could be seen with early pulmonary edema. Cardiomegaly. Subtle opacity in the right infrahilar lung, atelectasis versus pneumonia.       DERIK CARDONA MD       Electronically Signed and Approved By: DERIK CARDONA MD on 7/31/2022 at 20:25                 Adult Transthoracic Echo Complete W/ Cont if Necessary Per Protocol    Result Date: 7/19/2022  Narrative: This result has an attachment that is not available.    EEG    Result Date: 8/1/2022  Narrative: Clinical history: 67-year-old man with new onset seizure EEG description: This is a portable 19 channel EEG recording using the 10/20 international classification of electrode placement. EEG report: Underlying background activity consists of admixture of frequencies.  6 to 8 Hz activity amplitude of 30 µV is noted in the posterior regions during wakefulness.  During drowsiness there is 2 to 3 Hz delta activity noted of 60 to 90 µV noted in the frontal regions.  This intermixed with 4 to 5 Hz activity amplitude of 30 to 40 µV.  Vertex activity is noted in the frontal and central regions intermittent throughout the entire recording.  Intermittent triphasic waves are noted.  There were no definite epileptiform discharges that was recorded.  Photic stimulation did not activate any abnormalities. Impression: This EEG is abnormal secondary to slowing of background activity.  This finding is suggestive of a mild to moderate encephalopathy of nonspecific etiology.    CT Head Without Contrast    Result Date: 7/31/2022  Narrative: PROCEDURE: CT HEAD WO CONTRAST   COMPARISON:  [facility], CT, HEAD W/O CONTRAST, 9/15/2019, 0:36. INDICATIONS: first seizure TODAY  PROTOCOL:   Standard imaging protocol performed    RADIATION:   DLP: 1081.2 mGy*cm   MA and/or KV was adjusted to minimize radiation dose.     TECHNIQUE: After obtaining the patient's consent, CT images were obtained without non-ionic intravenous contrast material.  FINDINGS:  No intracranial hemorrhage.  Diffuse volume loss.  Symmetric enlargement sulci ventricles.  Moderate to large amount low-density in the periventricular subcortical white matter.  No focal hypodensity to suggest acute or subacute infarct.  Gray-white matter differentiation is intact.  Globes and extraocular muscles are normal appearance.  No significant scalp hematoma.  Mastoid air cells and visualized paranasal sinuses are well aerated.      Impression:  No acute intracranial abnormality on head CT. Mild volume loss. Moderate to severe amount of low-density in the periventricular and subcortical white matter.  This is nonspecific, but most commonly seen with chronic small vessel ischemic change.     DERIK CARDONA MD       Electronically Signed and Approved By: DERIK CARDONA MD on 7/31/2022 at 20:42             MRI Brain Without Contrast    Result Date: 8/1/2022  Narrative: PROCEDURE: MRI BRAIN WO CONTRAST  COMPARISON: [facility], MR, CERVICAL SPINE W/O CONT, 5/19/2014, 14:30.  [facility], CT, CT HEAD WO CONTRAST, 7/31/2022, 20:25.  [facility], MR, BRAIN W/WO CONTRAST, 3/06/2013, 5:51.  INDICATIONS: NEW ONSET OF SEIZURE LIKE ACTIVITY      TECHNIQUE: A variety of imaging planes and parameters were utilized for visualization of suspected pathology.  Images were performed without contrast.  FINDINGS:  The examination is limited by patient motion artifact.    Diffusion weighted images are negative for acute infarction.  There is no evidence of intracranial hemorrhage on susceptibility  weighted imaging.  Moderate T2 high signal is noted in the cerebral white matter, likely representing small vessel ischemic disease in a patient of this age.  Chronic lacunar infarcts are noted in the basal ganglia bilaterally.  The ventricles are normal in size and configuration.  There is mild to moderate diffuse cerebral and cerebellar atrophy compatible with the patient's age.  Intravenous contrast was not given to assess for abnormal enhancement.  There is no evidence of mesial temporal sclerosis on either side.  No focal osseous abnormality is seen.      Impression:   1. Moderate small vessel ischemic changes in the white matter 2. Chronic lacunar infarcts in the bilateral basal ganglia 3. No acute infarction or intracranial hemorrhage      Too Holley M.D.       Electronically Signed and Approved By: Too Holley M.D. on 8/01/2022 at 16:22             XR Chest 1 View    Result Date: 7/31/2022  Narrative: PROCEDURE: XR CHEST 1 VW  COMPARISON: [facility], , CHEST AP/PA 1 VIEW, 9/15/2019, 0:01.  INDICATIONS: afib  FINDINGS:  Lungs normally expanded.  Cardiomegaly.  No pneumothorax or pleural effusion.  Calcified granulomas and calcified hilar mediastinal lymph nodes.  There is some subtle airspace opacity in the infrahilar right lung.  Pulmonary vessels are slightly ill-defined.      Impression:  Slight thickening of the pulmonary vessels could be seen with early pulmonary edema. Cardiomegaly. Subtle opacity in the right infrahilar lung, atelectasis versus pneumonia.       DERIK CARDONA MD       Electronically Signed and Approved By: DERIK CARDONA MD on 7/31/2022 at 20:25                   Time spent on Discharge including face to face service:  34 minutes    Electronically signed by Carrillo Banks MD, 08/02/22, 12:57 PM EDT.

## 2022-08-03 NOTE — OUTREACH NOTE
Prep Survey    Flowsheet Row Responses   [facility] facility patient discharged from? [facility]   Is LACE score < 7 ? Yes   Emergency Room discharge w/ pulse ox? No   Eligibility Readm Mgmt   Discharge diagnosis Encephalopathy   Does the patient have one of the following disease processes/diagnoses(primary or secondary)? Other   Does the patient have Home health ordered? No   Is there a DME ordered? No   Prep survey completed? Yes          JUDAH ARRIAGA - Registered Nurse

## 2022-08-07 LAB — QT INTERVAL: 377 MS

## 2022-12-04 ENCOUNTER — HOSPITAL ENCOUNTER (EMERGENCY)
Facility: HOSPITAL | Age: 67
Discharge: HOME | End: 2022-12-04
Attending: EMERGENCY MEDICINE | Admitting: EMERGENCY MEDICINE

## 2022-12-04 ENCOUNTER — APPOINTMENT (OUTPATIENT)
Dept: GENERAL RADIOLOGY | Facility: HOSPITAL | Age: 67
End: 2022-12-04

## 2022-12-04 VITALS
WEIGHT: 286.82 LBS | HEIGHT: 70 IN | HEART RATE: 98 BPM | RESPIRATION RATE: 16 BRPM | TEMPERATURE: 98.4 F | OXYGEN SATURATION: 94 % | SYSTOLIC BLOOD PRESSURE: 191 MMHG | BODY MASS INDEX: 41.06 KG/M2 | DIASTOLIC BLOOD PRESSURE: 119 MMHG

## 2022-12-04 DIAGNOSIS — R06.02 SHORTNESS OF BREATH: Primary | ICD-10-CM

## 2022-12-04 LAB
ALBUMIN SERPL-MCNC: 4 G/DL (ref 3.5–5.2)
ALBUMIN/GLOB SERPL: 1.2 G/DL
ALP SERPL-CCNC: 148 U/L (ref 39–117)
ALT SERPL W P-5'-P-CCNC: 14 U/L (ref 1–41)
ANION GAP SERPL CALCULATED.3IONS-SCNC: 8.9 MMOL/L (ref 5–15)
AST SERPL-CCNC: 19 U/L (ref 1–40)
BASOPHILS # BLD AUTO: 0.12 10*3/MM3 (ref 0–0.2)
BASOPHILS NFR BLD AUTO: 1.2 % (ref 0–1.5)
BILIRUB SERPL-MCNC: 0.3 MG/DL (ref 0–1.2)
BUN SERPL-MCNC: 7 MG/DL (ref 8–23)
BUN/CREAT SERPL: 7.2 (ref 7–25)
CALCIUM SPEC-SCNC: 9 MG/DL (ref 8.6–10.5)
CHLORIDE SERPL-SCNC: 99 MMOL/L (ref 98–107)
CO2 SERPL-SCNC: 31.1 MMOL/L (ref 22–29)
CREAT SERPL-MCNC: 0.97 MG/DL (ref 0.76–1.27)
DEPRECATED RDW RBC AUTO: 43.5 FL (ref 37–54)
EGFRCR SERPLBLD CKD-EPI 2021: 85 ML/MIN/1.73
EOSINOPHIL # BLD AUTO: 0.48 10*3/MM3 (ref 0–0.4)
EOSINOPHIL NFR BLD AUTO: 4.9 % (ref 0.3–6.2)
ERYTHROCYTE [DISTWIDTH] IN BLOOD BY AUTOMATED COUNT: 13.2 % (ref 12.3–15.4)
GLOBULIN UR ELPH-MCNC: 3.4 GM/DL
GLUCOSE SERPL-MCNC: 115 MG/DL (ref 65–99)
HCT VFR BLD AUTO: 40.4 % (ref 37.5–51)
HGB BLD-MCNC: 13 G/DL (ref 13–17.7)
HOLD SPECIMEN: NORMAL
HOLD SPECIMEN: NORMAL
IMM GRANULOCYTES # BLD AUTO: 0.04 10*3/MM3 (ref 0–0.05)
IMM GRANULOCYTES NFR BLD AUTO: 0.4 % (ref 0–0.5)
LYMPHOCYTES # BLD AUTO: 1.12 10*3/MM3 (ref 0.7–3.1)
LYMPHOCYTES NFR BLD AUTO: 11.4 % (ref 19.6–45.3)
MCH RBC QN AUTO: 29.1 PG (ref 26.6–33)
MCHC RBC AUTO-ENTMCNC: 32.2 G/DL (ref 31.5–35.7)
MCV RBC AUTO: 90.4 FL (ref 79–97)
MONOCYTES # BLD AUTO: 0.63 10*3/MM3 (ref 0.1–0.9)
MONOCYTES NFR BLD AUTO: 6.4 % (ref 5–12)
NEUTROPHILS NFR BLD AUTO: 7.44 10*3/MM3 (ref 1.7–7)
NEUTROPHILS NFR BLD AUTO: 75.7 % (ref 42.7–76)
NRBC BLD AUTO-RTO: 0 /100 WBC (ref 0–0.2)
NT-PROBNP SERPL-MCNC: 288 PG/ML (ref 0–900)
PLATELET # BLD AUTO: 370 10*3/MM3 (ref 140–450)
PMV BLD AUTO: 9.2 FL (ref 6–12)
POTASSIUM SERPL-SCNC: 3.4 MMOL/L (ref 3.5–5.2)
PROT SERPL-MCNC: 7.4 G/DL (ref 6–8.5)
QT INTERVAL: 443 MS
RBC # BLD AUTO: 4.47 10*6/MM3 (ref 4.14–5.8)
SODIUM SERPL-SCNC: 139 MMOL/L (ref 136–145)
TROPONIN T SERPL-MCNC: <0.01 NG/ML (ref 0–0.03)
WBC NRBC COR # BLD: 9.83 10*3/MM3 (ref 3.4–10.8)
WHOLE BLOOD HOLD COAG: NORMAL
WHOLE BLOOD HOLD SPECIMEN: NORMAL

## 2022-12-04 PROCEDURE — 99284 EMERGENCY DEPT VISIT MOD MDM: CPT

## 2022-12-04 PROCEDURE — 93010 ELECTROCARDIOGRAM REPORT: CPT | Performed by: INTERNAL MEDICINE

## 2022-12-04 PROCEDURE — 84484 ASSAY OF TROPONIN QUANT: CPT | Performed by: EMERGENCY MEDICINE

## 2022-12-04 PROCEDURE — 93005 ELECTROCARDIOGRAM TRACING: CPT | Performed by: EMERGENCY MEDICINE

## 2022-12-04 PROCEDURE — 83880 ASSAY OF NATRIURETIC PEPTIDE: CPT | Performed by: EMERGENCY MEDICINE

## 2022-12-04 PROCEDURE — 25010000002 FUROSEMIDE PER 20 MG: Performed by: EMERGENCY MEDICINE

## 2022-12-04 PROCEDURE — 85025 COMPLETE CBC W/AUTO DIFF WBC: CPT | Performed by: EMERGENCY MEDICINE

## 2022-12-04 PROCEDURE — 71045 X-RAY EXAM CHEST 1 VIEW: CPT

## 2022-12-04 PROCEDURE — 99284 EMERGENCY DEPT VISIT MOD MDM: CPT | Performed by: INTERNAL MEDICINE

## 2022-12-04 PROCEDURE — 93005 ELECTROCARDIOGRAM TRACING: CPT

## 2022-12-04 PROCEDURE — 25010000002 HYDRALAZINE PER 20 MG: Performed by: EMERGENCY MEDICINE

## 2022-12-04 PROCEDURE — 96374 THER/PROPH/DIAG INJ IV PUSH: CPT

## 2022-12-04 PROCEDURE — 36415 COLL VENOUS BLD VENIPUNCTURE: CPT

## 2022-12-04 PROCEDURE — 80053 COMPREHEN METABOLIC PANEL: CPT | Performed by: EMERGENCY MEDICINE

## 2022-12-04 PROCEDURE — 96375 TX/PRO/DX INJ NEW DRUG ADDON: CPT

## 2022-12-04 RX ORDER — SODIUM CHLORIDE 0.9 % (FLUSH) 0.9 %
10 SYRINGE (ML) INJECTION AS NEEDED
Status: DISCONTINUED | OUTPATIENT
Start: 2022-12-04 | End: 2022-12-04 | Stop reason: HOSPADM

## 2022-12-04 RX ORDER — POTASSIUM CHLORIDE 750 MG/1
40 CAPSULE, EXTENDED RELEASE ORAL ONCE
Status: DISCONTINUED | OUTPATIENT
Start: 2022-12-04 | End: 2022-12-04 | Stop reason: HOSPADM

## 2022-12-04 RX ORDER — FUROSEMIDE 10 MG/ML
40 INJECTION INTRAMUSCULAR; INTRAVENOUS ONCE
Status: COMPLETED | OUTPATIENT
Start: 2022-12-04 | End: 2022-12-04

## 2022-12-04 RX ORDER — HYDRALAZINE HYDROCHLORIDE 20 MG/ML
10 INJECTION INTRAMUSCULAR; INTRAVENOUS ONCE
Status: COMPLETED | OUTPATIENT
Start: 2022-12-04 | End: 2022-12-04

## 2022-12-04 RX ADMIN — HYDRALAZINE HYDROCHLORIDE 10 MG: 20 INJECTION, SOLUTION INTRAMUSCULAR; INTRAVENOUS at 13:57

## 2022-12-04 RX ADMIN — FUROSEMIDE 40 MG: 10 INJECTION, SOLUTION INTRAMUSCULAR; INTRAVENOUS at 13:20

## 2022-12-04 NOTE — CONSULTS
[facility]   HOSPITALIST HISTORY AND PHYSICAL  Date: 2022   Patient Name: Sarah Osorio  : 1955  MRN: [id number]  Primary Care Physician:  Silvia Faustin APRN  Date of admission: 2022    Subjective   Subjective     Chief Complaint: Shortness of breath    HPI:  Sarah Osorio is a 67 y.o. male with history of systolic heart failure but on most recent echocardiogram (2020) patient had normal EF and diastolic heart failure, essential pretension, hyperlipidemia, history of seizure disorder and obesity who presented to the ED with complaints of progressively worsening shortness of breath.  Patient noted that symptoms have been progressing for over 1 week.  Patient reportedly would be unable to transfer from chair to bed which was roughly 10 feet without becoming severely short of breath.  Patient denies any orthopnea, paroxysmal nocturnal dyspnea dyspnea or worsening peripheral edema.  Patient thought maybe he was having some worsening abdominal edema but was unsure.  He denies any chest pain and denied any chest pain associated with shortness of breath.  But due to progression of his symptoms and persistence he presented to ED for evaluation.  Eval in ED showed patient to have proBNP within normal limits, CXR obtained and no acute infiltrates, pleural effusion and no abnormality noted.  Labs did show hypokalemia but no other abnormalities were appreciated.  Patient was hypertensive in the ED, patient stated that he had not taken any of his home antihypertensive medications because he normally does not take them until the evening.  Patient received IV Lasix 40 mg and hydralazine IV 10 mg.  Patient's blood pressure responded well.  Patient noted that he had good urinary output with the IV Lasix and that his shortness of breath had improved some.  Hospitalist service was contacted for further evaluation and assistance in care.      Personal History     Past Medical  History:  Hypertension  Hyperlipidemia  Obesity BMI: 41.5  History of systolic heart failure but on most recent echocardiogram patient had normal EF and diastolic heart failure  History of seizure disorder    Past Surgical History:  Appendectomy  Hernia repair    Family History:   Mother: ; history of Alzheimer's dementia  Father: ; history of heart disease    Social History:   Tobacco: Denies use  Alcohol: Denies use  Illicit Substances: Denies use    Home Medications:  HYDROcodone-acetaminophen, carvedilol, clopidogrel, furosemide, gabapentin, lisinopril, multivitamin with minerals, pantoprazole, potassium chloride, pravastatin, spironolactone, and zolpidem    Allergies:  No Known Allergies    Review of Systems  All systems were reviewed and negative except for:   Review of Systems - General ROS: positive for  - fatigue  Cardiovascular ROS: positive for - dyspnea on exertion and shortness of breath      Objective   Objective     Vitals:   Temp:  [98.4 °F (36.9 °C)] 98.4 °F (36.9 °C)  Heart Rate:  [73-98] 98  Resp:  [16-26] 16  BP: (159-191)/(104-119) 191/119    Physical Exam    Constitutional: Awake, alert, no acute distress, sitting up in bed, wife present at bedside   Eyes: Pupils equal, sclerae anicteric, no conjunctival injection   HENT: NCAT, mucous membranes moist   Neck: Supple, no thyromegaly, no lymphadenopathy, trachea midline   Respiratory: Clear to auscultation bilaterally, equal chest rise bilaterally, minimal conversational dyspnea noted after extended conversation   Cardiovascular: Regular rhythm, tachycardic, no murmurs, rubs, or gallops, palpable pedal pulses bilaterally   Gastrointestinal: Obese, positive bowel sounds, soft, nontender, nondistended   Musculoskeletal: No bilateral ankle edema, no clubbing or cyanosis to extremities   Psychiatric: Appropriate affect, cooperative   Neurologic: Oriented x 3, strength symmetric in all extremities, Cranial Nerves grossly intact, speech  clear   Skin: No rashes     Result Review    Result Review:  I have personally reviewed the results from the time of this admission to 12/4/2022 15:57 EST and agree with these findings:  [x]  Laboratory: Potassium low.  Creatinine within normal limits.  Hemoglobin and platelets within normal limits.  Troponin not elevated.  []  Microbiology:   [x]  Radiology: CXR imaging reviewed.  No acute infiltrate, pleural effusion or pneumothorax appreciated.  []  EKG/Telemetry:   []  Cardiology/Vascular:   []  Pathology:  []  Old records:  []  Other:      Assessment & Plan   Assessment / Plan     Assessment:   Shortness of breath  Chronic diastolic heart failure, possible acute exacerbation  Hypokalemia  Obesity with BMI 41.15  Essential hypertension  Hyperlipidemia  Chronic lacunar infarcts in bilateral basal ganglia  History of seizure disorder  History of CAD  Chronic back pain    Plan:  -Discussed admission with the patient and patient's wife present bedside.  Noted that if patient were to come to the hospital I would continue IV diuresis, monitor electrolytes and obtain echocardiogram.  Discussed with the patient and patient's wife that patient's oxygen status was stable, chest x-ray was clear and patient's blood pressure had improved after receiving as needed medications and also was expected to improve once patient took his home medications.  Using by discussion of bringing patient to the hospital was given his shortness of breath which was improved after receiving IV Lasix in the ED I would expect to continue to improve with additional IV diuretic dosing.  Patient stated that he would prefer to discharge home and follow-up with his primary care provider.  I recommended that patient double home Lasix dose of 40 mg daily to 40 mg twice daily and I also recommended that patient double home potassium dose.  I discussed with the ED physician to replace potassium in the ED and then also to instruct the patient to double  potassium replacement dose at home until he is able to follow-up with PCP and obtain repeat labs.  -Thank you for allowing me to participate in the care of this interesting and pleasant patient if I can be of any further assistance please feel free to contact me at any time.       Time spent personally reviewing patients labs and imaging, discussing results and care plan with patient, patient's wife and nurse at bedside and the following providers: Dr. Asha Field (ED Physician): 45 minutes.     Part of this note may be an electronic transcription/translation of spoken language to printed text using the Dragon dictation system.      DVT prophylaxis:  No DVT prophylaxis order currently exists.    CODE STATUS:     Full Code    Electronically signed by Anthony Brown MD, 12/04/22, 3:57 PM EST.

## 2022-12-04 NOTE — ED PROVIDER NOTES
Time: 1:00 PM EST    Chief Complaint: shortness of breath  History provided by: patient  History is limited by: N/A     History of Present Illness:  Patient is a 67 y.o. year old male who presents to the emergency department with worsening shortness of breath over the past several days.  Patient denies cough and hemoptysis.  Patient reports that shortness of breath is worse with exertion.  Patient denies orthopnea.  Patient has no nausea or vomiting.  Patient denies chest pain.  Patient does report increased weight over the past several weeks.  Patient has no subjective neurological deficit including numbness, tingling, or motor weakness.  Patient denies fever and chills.      Shortness of Breath  Severity:  Mild  Onset quality:  Gradual  Timing:  Constant  Progression:  Worsening  Chronicity:  New  Relieved by:  Rest  Worsened by:  Exertion  Associated symptoms: no abdominal pain, no chest pain, no cough, no fever, no headaches, no sore throat and no vomiting        Similar Symptoms Previously: yes  Recently seen: no      Patient Care Team  Primary Care Provider: Silvia Faustin APRN    Past Medical History:     No Known Allergies  Past Medical History:   Diagnosis Date   • Abdominal aneurysm    • CHF (congestive heart failure) (HCC)    • Hyperlipidemia    • Hypertension    • Injury of back      Past Surgical History:   Procedure Laterality Date   • APPENDECTOMY     • CARDIAC CATHETERIZATION     • HERNIA REPAIR       History reviewed. No pertinent family history.    Home Medications:  Prior to Admission medications    Medication Sig Start Date End Date Taking? Authorizing Provider   carvedilol (COREG) 3.125 MG tablet Take 3.125 mg by mouth 2 (Two) Times a Day With Meals.    ProviderAmina MD   clopidogrel (PLAVIX) 75 MG tablet Take 75 mg by mouth Daily.    ProviderAmina MD   furosemide (LASIX) 40 MG tablet Take 40 mg by mouth.    ProviderAmina MD   gabapentin (NEURONTIN) 800 MG tablet Take  0.5 tablets by mouth 4 (Four) Times a Day. 8/2/22   Carrillo Adkins MD   HYDROcodone-acetaminophen (NORCO)  MG per tablet Take 1 tablet by mouth Every 6 (Six) Hours As Needed. 6/1/22   Amina Oswald MD   lisinopril (PRINIVIL,ZESTRIL) 20 MG tablet Take 20 mg by mouth Daily. 2/10/22   Amina Oswald MD   multivitamin with minerals tablet tablet Take 1 tablet by mouth Daily.    Amina Oswald MD   pantoprazole (PROTONIX) 40 MG EC tablet Take 40 mg by mouth Daily.    Amina Oswald MD   potassium chloride (MICRO-K) 10 MEQ CR capsule Take 10 mEq by mouth Daily. 5/8/22   Amina Oswald MD   pravastatin (PRAVACHOL) 40 MG tablet Take 40 mg by mouth Daily. 5/8/22   Amina Oswald MD   spironolactone (ALDACTONE) 25 MG tablet Take 25 mg by mouth Daily.    Amina Oswald MD   zolpidem (AMBIEN) 10 MG tablet Take 10 mg by mouth At Night As Needed. 6/30/22   Amina Oswald MD        Social History:   Social History     Tobacco Use   • Smoking status: Never   • Smokeless tobacco: Never   Vaping Use   • Vaping Use: Never used   Substance Use Topics   • Alcohol use: Not Currently   • Drug use: Never         Review of Systems:  Review of Systems   Constitutional: Negative for chills and fever.   HENT: Negative for congestion, rhinorrhea and sore throat.    Eyes: Negative for pain and visual disturbance.   Respiratory: Positive for shortness of breath. Negative for apnea, cough and chest tightness.    Cardiovascular: Negative for chest pain and palpitations.   Gastrointestinal: Negative for abdominal pain, diarrhea, nausea and vomiting.   Genitourinary: Negative for difficulty urinating and dysuria.   Musculoskeletal: Negative for joint swelling and myalgias.   Skin: Negative for color change.   Neurological: Negative for seizures and headaches.   Psychiatric/Behavioral: Negative.    All other systems reviewed and are negative.       Physical Exam:  BP (!) 191/119    "Pulse 98   Temp 98.4 °F (36.9 °C)   Resp 16   Ht 177.8 cm (70\")   Wt 130 kg (286 lb 13.1 oz)   SpO2 94%   BMI 41.15 kg/m²     Physical Exam  Vitals and nursing note reviewed.   Constitutional:       General: He is not in acute distress.     Appearance: Normal appearance. He is not toxic-appearing.   HENT:      Head: Normocephalic and atraumatic.      Jaw: There is normal jaw occlusion.   Eyes:      General: Lids are normal.      Extraocular Movements: Extraocular movements intact.      Conjunctiva/sclera: Conjunctivae normal.      Pupils: Pupils are equal, round, and reactive to light.   Cardiovascular:      Rate and Rhythm: Normal rate and regular rhythm.      Pulses: Normal pulses.      Heart sounds: Normal heart sounds.   Pulmonary:      Effort: Pulmonary effort is normal. No respiratory distress.      Breath sounds: Normal breath sounds. No wheezing or rhonchi.   Abdominal:      General: Abdomen is flat.      Palpations: Abdomen is soft.      Tenderness: There is no abdominal tenderness. There is no guarding or rebound.   Musculoskeletal:         General: Normal range of motion.      Cervical back: Normal range of motion and neck supple.      Right lower leg: Edema present.      Left lower leg: Edema present.   Skin:     General: Skin is warm and dry.   Neurological:      Mental Status: He is alert and oriented to person, place, and time. Mental status is at baseline.   Psychiatric:         Mood and Affect: Mood normal.                Medications in the Emergency Department:  Medications   sodium chloride 0.9 % flush 10 mL (has no administration in time range)   furosemide (LASIX) injection 40 mg (40 mg Intravenous Given 12/4/22 1320)   hydrALAZINE (APRESOLINE) injection 10 mg (10 mg Intravenous Given 12/4/22 1357)        Labs  Lab Results (last 24 hours)     Procedure Component Value Units Date/Time    CBC & Differential [[id number]]  (Abnormal) Collected: 12/04/22 1149    Specimen: Blood Updated: 12/04/22 " 1473    Narrative:      The following orders were created for panel order CBC & Differential.  Procedure                               Abnormality         Status                     ---------                               -----------         ------                     CBC Auto Differential[[id number]]        Abnormal            Final result                 Please view results for these tests on the individual orders.    Comprehensive Metabolic Panel [[id number]]  (Abnormal) Collected: 12/04/22 1149    Specimen: Blood Updated: 12/04/22 1243     Glucose 115 mg/dL      BUN 7 mg/dL      Creatinine 0.97 mg/dL      Sodium 139 mmol/L      Potassium 3.4 mmol/L      Chloride 99 mmol/L      CO2 31.1 mmol/L      Calcium 9.0 mg/dL      Total Protein 7.4 g/dL      Albumin 4.00 g/dL      ALT (SGPT) 14 U/L      AST (SGOT) 19 U/L      Alkaline Phosphatase 148 U/L      Total Bilirubin 0.3 mg/dL      Globulin 3.4 gm/dL      A/G Ratio 1.2 g/dL      BUN/Creatinine Ratio 7.2     Anion Gap 8.9 mmol/L      eGFR 85.0 mL/min/1.73      Comment: National Kidney Foundation and American Society of Nephrology (ASN) Task Force recommended calculation based on the Chronic Kidney Disease Epidemiology Collaboration (CKD-EPI) equation refit without adjustment for race.       Narrative:      GFR Normal >60  Chronic Kidney Disease <60  Kidney Failure <15      BNP [[id number]]  (Normal) Collected: 12/04/22 1149    Specimen: Blood Updated: 12/04/22 1242     proBNP 288.0 pg/mL     Narrative:      Among patients with dyspnea, NT-proBNP is highly sensitive for the detection of acute congestive heart failure. In addition NT-proBNP of <300 pg/ml effectively rules out acute congestive heart failure with 99% negative predictive value.      Troponin [[id number]]  (Normal) Collected: 12/04/22 1149    Specimen: Blood Updated: 12/04/22 1243     Troponin T <0.010 ng/mL     Narrative:      Troponin T Reference Range:  <= 0.03 ng/mL-   Negative for AMI  >0.03 ng/mL-      Abnormal for myocardial necrosis.  Clinicians would have to utilize clinical acumen, EKG, Troponin and serial changes to determine if it is an Acute Myocardial Infarction or myocardial injury due to an underlying chronic condition.       Results may be falsely decreased if patient taking Biotin.      CBC Auto Differential [[id number]]  (Abnormal) Collected: 12/04/22 1149    Specimen: Blood Updated: 12/04/22 1223     WBC 9.83 10*3/mm3      RBC 4.47 10*6/mm3      Hemoglobin 13.0 g/dL      Hematocrit 40.4 %      MCV 90.4 fL      MCH 29.1 pg      MCHC 32.2 g/dL      RDW 13.2 %      RDW-SD 43.5 fl      MPV 9.2 fL      Platelets 370 10*3/mm3      Neutrophil % 75.7 %      Lymphocyte % 11.4 %      Monocyte % 6.4 %      Eosinophil % 4.9 %      Basophil % 1.2 %      Immature Grans % 0.4 %      Neutrophils, Absolute 7.44 10*3/mm3      Lymphocytes, Absolute 1.12 10*3/mm3      Monocytes, Absolute 0.63 10*3/mm3      Eosinophils, Absolute 0.48 10*3/mm3      Basophils, Absolute 0.12 10*3/mm3      Immature Grans, Absolute 0.04 10*3/mm3      nRBC 0.0 /100 WBC            Imaging:  XR Chest 1 View    Result Date: 12/4/2022  PROCEDURE: XR CHEST 1 VW  COMPARISON: [facility], , XR CHEST 1 VW, 7/31/2022, 20:04.  INDICATIONS: SOA Triage Protocol  FINDINGS:  The lungs are clear bilaterally.  The cardiac and mediastinal silhouettes appear normal.  No effusion is seen.        1. No acute cardiopulmonary disease       Too Holley M.D.       Electronically Signed and Approved By: Too Holley M.D. on 12/04/2022 at 12:38               Procedures:  Procedures    Progress  ED Course as of 12/04/22 1545   Sun Dec 04, 2022   1214 SOA, no respiratory HX, started couple weeks ago. At that time he had  SOA with activity however now it is SOA with conversation.  [MS]   1542 EKG:    Rhythm: sinus  Rate: 74  Axis: normal  Intervals: normal  ST Segment: no elevations      Interpreted by me   [BN]      ED Course User Index  [BN] Emir  MD Asha  [MS] Roshni Cavanaugh, LUIS M                            Medical Decision Making:  MDM  Number of Diagnoses or Management Options  Shortness of breath  Diagnosis management comments: The patient´s CBC that was reviewed and interpreted by me shows no abnormalities of critical concern. Of note, there is no anemia requiring a blood transfusion and the platelet count is acceptable.  The patient´s CMP that was reviewed and interpretted by me shows no abnormalities of critical concern. Of note, the patient´s sodium and potassium are acceptable. The patient´s liver enzymes are unremarkable. The patient´s renal function (creatinine) is preserved. The patient has a normal anion gap.  Troponin is negative.  BNP is 288.  Chest x-ray is negative for acute infiltrate as interpreted by me.  I did reach out to Dr. Brown and discussed the case as I believe the patient could benefit from admission for a 2D echo and reevaluation.  Dr. Angel came to the emergency department evaluated the patient and also offered the patient admission.  The patient did decline to be admitted stating that he does want to go home and follow-up with his primary care doctor.  Wife is at the bedside and states that she will ensure that the patient does follow-up.  The patient was advised to take extra dose of his Lasix daily.  Patient has no respiratory distress, nor does he have hypoxia.  The patient was given hydralazine and Lasix IV in the emergency department.  Continuous cardiac monitoring was performed.  The patient presents to the ED with hypertension. The patient was placed on a monitor in the ED. The blood pressure is much improved with ED treatment. The patient denies chest pain. The patient has a normal neurological exam and has mental status at baseline. Upon re-examination, the patient has no signs or symptoms of acute end organ damage.  The patient was advised of the importance of medical compliance with an emphasis in  awareness of their daily sodium intake. The patient was counseled that elevated blood pressure is detrimental to their heart, eyes, kidneys, and may lead to a stroke. The patient was advised to check their blood pressure regularly and follow up as an outpatient regarding this matter. The patient was counseled to return to the ED for sudden or severe headache, numbness or tingling on one side of the body, facial droop, difficulty speaking, chest pain, or shortness of breath. The patient's condition is stable and appropriate for discharge. The patient will pursue further outpatient evaluation with the primary care physician or other designated or consulting physician as indicated in the discharge instructions.    Total Critical Care time of 40 minutes. Total critical care time documented does not include time spent on separately billed procedures for services of nurses or physician assistants. I personally saw and examined the patient. I have reviewed all diagnostic interpretations and treatment plans as written. I was present for the key portions of any procedures performed and the inclusive time noted in any critical care statement. Critical care time includes patient management by me, time spent at the patients bedside,  time to review lab and imaging results, discussing patient care, documentation in the medical record, and time spent with family or caregiver.       Amount and/or Complexity of Data Reviewed  Clinical lab tests: reviewed  Tests in the radiology section of CPT®: reviewed  Tests in the medicine section of CPT®: reviewed  Independent visualization of images, tracings, or specimens: yes    Risk of Complications, Morbidity, and/or Mortality  Presenting problems: moderate  Management options: moderate    Patient Progress  Patient progress: stable       Final diagnoses:   Shortness of breath        Disposition:  ED Disposition     ED Disposition   Discharge    Condition   Stable    Comment   --              This medical record created using voice recognition software.           Asha Field MD  12/04/22 7469

## 2023-03-07 ENCOUNTER — TRANSCRIBE ORDERS (OUTPATIENT)
Dept: ADMINISTRATIVE | Facility: HOSPITAL | Age: 68
End: 2023-03-07
Payer: MEDICARE

## 2023-03-07 DIAGNOSIS — R14.0 ABDOMINAL DISTENTION: Primary | ICD-10-CM

## 2024-06-27 ENCOUNTER — HOSPITAL ENCOUNTER (EMERGENCY)
Facility: HOSPITAL | Age: 69
Discharge: HOME | End: 2024-06-27
Attending: EMERGENCY MEDICINE
Payer: MEDICARE

## 2024-06-27 VITALS
SYSTOLIC BLOOD PRESSURE: 101 MMHG | DIASTOLIC BLOOD PRESSURE: 70 MMHG | TEMPERATURE: 98.2 F | HEIGHT: 70 IN | BODY MASS INDEX: 41.85 KG/M2 | OXYGEN SATURATION: 95 % | WEIGHT: 292.33 LBS | RESPIRATION RATE: 20 BRPM | HEART RATE: 70 BPM

## 2024-06-27 DIAGNOSIS — K62.6 RECTAL ULCERATION: Primary | ICD-10-CM

## 2024-06-27 LAB
ALBUMIN SERPL-MCNC: 3.9 G/DL (ref 3.5–5.2)
ALBUMIN/GLOB SERPL: 1.1 G/DL
ALP SERPL-CCNC: 140 U/L (ref 39–117)
ALT SERPL W P-5'-P-CCNC: 17 U/L (ref 1–41)
ANION GAP SERPL CALCULATED.3IONS-SCNC: 7.7 MMOL/L (ref 5–15)
AST SERPL-CCNC: 25 U/L (ref 1–40)
BASOPHILS # BLD AUTO: 0.12 10*3/MM3 (ref 0–0.2)
BASOPHILS NFR BLD AUTO: 1.3 % (ref 0–1.5)
BILIRUB SERPL-MCNC: 0.5 MG/DL (ref 0–1.2)
BUN SERPL-MCNC: 8 MG/DL (ref 8–23)
BUN/CREAT SERPL: 10.3 (ref 7–25)
CALCIUM SPEC-SCNC: 9.4 MG/DL (ref 8.6–10.5)
CHLORIDE SERPL-SCNC: 97 MMOL/L (ref 98–107)
CO2 SERPL-SCNC: 33.3 MMOL/L (ref 22–29)
CREAT SERPL-MCNC: 0.78 MG/DL (ref 0.76–1.27)
DEPRECATED RDW RBC AUTO: 48.6 FL (ref 37–54)
EGFRCR SERPLBLD CKD-EPI 2021: 95.9 ML/MIN/1.73
EOSINOPHIL # BLD AUTO: 0.21 10*3/MM3 (ref 0–0.4)
EOSINOPHIL NFR BLD AUTO: 2.3 % (ref 0.3–6.2)
ERYTHROCYTE [DISTWIDTH] IN BLOOD BY AUTOMATED COUNT: 14.7 % (ref 12.3–15.4)
GLOBULIN UR ELPH-MCNC: 3.5 GM/DL
GLUCOSE SERPL-MCNC: 125 MG/DL (ref 65–99)
HCT VFR BLD AUTO: 42.7 % (ref 37.5–51)
HGB BLD-MCNC: 13.5 G/DL (ref 13–17.7)
HOLD SPECIMEN: NORMAL
IMM GRANULOCYTES # BLD AUTO: 0.03 10*3/MM3 (ref 0–0.05)
IMM GRANULOCYTES NFR BLD AUTO: 0.3 % (ref 0–0.5)
LYMPHOCYTES # BLD AUTO: 1.16 10*3/MM3 (ref 0.7–3.1)
LYMPHOCYTES NFR BLD AUTO: 12.6 % (ref 19.6–45.3)
MCH RBC QN AUTO: 28.8 PG (ref 26.6–33)
MCHC RBC AUTO-ENTMCNC: 31.6 G/DL (ref 31.5–35.7)
MCV RBC AUTO: 91.2 FL (ref 79–97)
MONOCYTES # BLD AUTO: 0.57 10*3/MM3 (ref 0.1–0.9)
MONOCYTES NFR BLD AUTO: 6.2 % (ref 5–12)
NEUTROPHILS NFR BLD AUTO: 7.15 10*3/MM3 (ref 1.7–7)
NEUTROPHILS NFR BLD AUTO: 77.3 % (ref 42.7–76)
NRBC BLD AUTO-RTO: 0 /100 WBC (ref 0–0.2)
PLATELET # BLD AUTO: 349 10*3/MM3 (ref 140–450)
PMV BLD AUTO: 8.9 FL (ref 6–12)
POTASSIUM SERPL-SCNC: 4.2 MMOL/L (ref 3.5–5.2)
PROT SERPL-MCNC: 7.4 G/DL (ref 6–8.5)
RBC # BLD AUTO: 4.68 10*6/MM3 (ref 4.14–5.8)
SODIUM SERPL-SCNC: 138 MMOL/L (ref 136–145)
WBC NRBC COR # BLD AUTO: 9.24 10*3/MM3 (ref 3.4–10.8)
WHOLE BLOOD HOLD COAG: NORMAL

## 2024-06-27 PROCEDURE — 99283 EMERGENCY DEPT VISIT LOW MDM: CPT

## 2024-06-27 PROCEDURE — 36415 COLL VENOUS BLD VENIPUNCTURE: CPT

## 2024-06-27 PROCEDURE — 85025 COMPLETE CBC W/AUTO DIFF WBC: CPT | Performed by: EMERGENCY MEDICINE

## 2024-06-27 PROCEDURE — 80053 COMPREHEN METABOLIC PANEL: CPT | Performed by: EMERGENCY MEDICINE

## 2024-06-27 RX ORDER — AMLODIPINE BESYLATE 5 MG/1
5 TABLET ORAL DAILY
COMMUNITY
Start: 2024-03-26

## 2024-06-27 RX ORDER — EMOLLIENT COMBINATION NO.32
1 EMULSION, EXTENDED RELEASE TOPICAL 2 TIMES DAILY WITH MEALS
Qty: 2 G | Refills: 0 | Status: SHIPPED | OUTPATIENT
Start: 2024-06-27 | End: 2025-06-27

## 2024-06-27 RX ORDER — CEPHALEXIN 500 MG/1
500 CAPSULE ORAL 3 TIMES DAILY
Qty: 21 CAPSULE | Refills: 0 | Status: SHIPPED | OUTPATIENT
Start: 2024-06-27

## 2024-06-27 NOTE — ED PROVIDER NOTES
Time: 2:02 PM EDT  Date of encounter:  6/27/2024  Independent Historian/Clinical History and Information was obtained by:   Patient    History is limited by: N/A    Chief Complaint: Rectal bleeding      History of Present Illness:  Patient is a 69 y.o. year old male who presents to the emergency department for evaluation of rectal bleeding that started earlier today.  Patient denies pain.  Patient has no chest pain or shortness of breath.  Patient has no cough hemoptysis.  Patient denies nausea, vomiting, and diarrhea.  Patient denies large bowel movements.    HPI    Patient Care Team  Primary Care Provider: Silvia Faustin APRN    Past Medical History:     No Known Allergies  Past Medical History:   Diagnosis Date    Abdominal aneurysm     CHF (congestive heart failure)     Hyperlipidemia     Hypertension     Injury of back      Past Surgical History:   Procedure Laterality Date    APPENDECTOMY      CARDIAC CATHETERIZATION      HERNIA REPAIR       History reviewed. No pertinent family history.    Home Medications:  Prior to Admission medications    Medication Sig Start Date End Date Taking? Authorizing Provider   amLODIPine (NORVASC) 5 MG tablet Take 1 tablet by mouth Daily. 3/26/24  Yes Amina Oswald MD   carvedilol (COREG) 3.125 MG tablet Take 3.125 mg by mouth 2 (Two) Times a Day With Meals.    ProviderAmina MD   clopidogrel (PLAVIX) 75 MG tablet Take 75 mg by mouth Daily.    Amina Oswald MD   furosemide (LASIX) 40 MG tablet Take 40 mg by mouth.    Amina Oswald MD   gabapentin (NEURONTIN) 800 MG tablet Take 0.5 tablets by mouth 4 (Four) Times a Day. 8/2/22   Carrillo Adkins MD   HYDROcodone-acetaminophen (NORCO)  MG per tablet Take 1 tablet by mouth Every 6 (Six) Hours As Needed. 6/1/22   Amina Oswald MD   lisinopril (PRINIVIL,ZESTRIL) 20 MG tablet Take 20 mg by mouth Daily. 2/10/22   Amina Oswald MD   multivitamin with minerals tablet tablet Take  "1 tablet by mouth Daily.    Amina Oswald MD   pantoprazole (PROTONIX) 40 MG EC tablet Take 40 mg by mouth Daily.    Amina Oswald MD   potassium chloride (MICRO-K) 10 MEQ CR capsule Take 10 mEq by mouth Daily. 5/8/22   Amina Oswald MD   pravastatin (PRAVACHOL) 40 MG tablet Take 40 mg by mouth Daily. 5/8/22   Amina Oswald MD   spironolactone (ALDACTONE) 25 MG tablet Take 25 mg by mouth Daily.    Amina Oswald MD   zolpidem (AMBIEN) 10 MG tablet Take 10 mg by mouth At Night As Needed. 6/30/22   Amina Oswald MD        Social History:   Social History     Tobacco Use    Smoking status: Never    Smokeless tobacco: Never   Vaping Use    Vaping status: Never Used   Substance Use Topics    Alcohol use: Not Currently    Drug use: Never         Review of Systems:  Review of Systems   Constitutional:  Negative for chills and fever.   HENT:  Negative for congestion, rhinorrhea and sore throat.    Eyes:  Negative for pain and visual disturbance.   Respiratory:  Negative for apnea, cough, chest tightness and shortness of breath.    Cardiovascular:  Negative for chest pain and palpitations.   Gastrointestinal:  Negative for abdominal pain, diarrhea, nausea and vomiting.   Genitourinary:  Negative for difficulty urinating and dysuria.   Musculoskeletal:  Negative for joint swelling and myalgias.   Skin:  Negative for color change.   Neurological:  Negative for seizures and headaches.   Psychiatric/Behavioral: Negative.     All other systems reviewed and are negative.       Physical Exam:  /70 (BP Location: Right arm, Patient Position: Lying)   Pulse 70   Temp 98.2 °F (36.8 °C) (Oral)   Resp 20   Ht 177.8 cm (70\")   Wt 133 kg (292 lb 5.3 oz)   SpO2 95%   BMI 41.94 kg/m²     Physical Exam  Vitals and nursing note reviewed.   Constitutional:       General: He is not in acute distress.     Appearance: Normal appearance. He is not toxic-appearing.   HENT:      Head: " Normocephalic and atraumatic.      Jaw: There is normal jaw occlusion.   Eyes:      General: Lids are normal.      Extraocular Movements: Extraocular movements intact.      Conjunctiva/sclera: Conjunctivae normal.      Pupils: Pupils are equal, round, and reactive to light.   Cardiovascular:      Rate and Rhythm: Normal rate and regular rhythm.      Pulses: Normal pulses.      Heart sounds: Normal heart sounds.   Pulmonary:      Effort: Pulmonary effort is normal. No respiratory distress.      Breath sounds: Normal breath sounds. No wheezing or rhonchi.   Abdominal:      General: Abdomen is flat.      Palpations: Abdomen is soft.      Tenderness: There is no abdominal tenderness. There is no guarding or rebound.   Genitourinary:     Comments: (+) Small ulceration to rectum (no active bleeding)  Musculoskeletal:         General: Normal range of motion.      Cervical back: Normal range of motion and neck supple.      Right lower leg: No edema.      Left lower leg: No edema.   Skin:     General: Skin is warm and dry.   Neurological:      Mental Status: He is alert and oriented to person, place, and time. Mental status is at baseline.   Psychiatric:         Mood and Affect: Mood normal.                  Procedures:  Procedures      Medical Decision Making:      Comorbidities that affect care:    Hypertension    External Notes reviewed:    Previous Clinic Note: Patient was last seen in clinic for medication refill      The following orders were placed and all results were independently analyzed by me:  Orders Placed This Encounter   Procedures    Comprehensive Metabolic Panel    CBC Auto Differential    CBC & Differential    Extra Tubes    Gold Top - SST    Light Blue Top       Medications Given in the Emergency Department:  Medications - No data to display     ED Course:         Labs:    Lab Results (last 24 hours)       Procedure Component Value Units Date/Time    Comprehensive Metabolic Panel [[id number]]  (Abnormal)  Collected: 06/27/24 1438    Specimen: Blood from Arm, Right Updated: 06/27/24 1508     Glucose 125 mg/dL      BUN 8 mg/dL      Creatinine 0.78 mg/dL      Sodium 138 mmol/L      Potassium 4.2 mmol/L      Chloride 97 mmol/L      CO2 33.3 mmol/L      Calcium 9.4 mg/dL      Total Protein 7.4 g/dL      Albumin 3.9 g/dL      ALT (SGPT) 17 U/L      AST (SGOT) 25 U/L      Alkaline Phosphatase 140 U/L      Total Bilirubin 0.5 mg/dL      Globulin 3.5 gm/dL      A/G Ratio 1.1 g/dL      BUN/Creatinine Ratio 10.3     Anion Gap 7.7 mmol/L      eGFR 95.9 mL/min/1.73     Narrative:      GFR Normal >60  Chronic Kidney Disease <60  Kidney Failure <15      CBC & Differential [[id number]]  (Abnormal) Collected: 06/27/24 1438    Specimen: Blood from Arm, Right Updated: 06/27/24 1454    Narrative:      The following orders were created for panel order CBC & Differential.  Procedure                               Abnormality         Status                     ---------                               -----------         ------                     CBC Auto Differential[[id number]]        Abnormal            Final result                 Please view results for these tests on the individual orders.    CBC Auto Differential [[id number]]  (Abnormal) Collected: 06/27/24 1438    Specimen: Blood from Arm, Right Updated: 06/27/24 1454     WBC 9.24 10*3/mm3      RBC 4.68 10*6/mm3      Hemoglobin 13.5 g/dL      Hematocrit 42.7 %      MCV 91.2 fL      MCH 28.8 pg      MCHC 31.6 g/dL      RDW 14.7 %      RDW-SD 48.6 fl      MPV 8.9 fL      Platelets 349 10*3/mm3      Neutrophil % 77.3 %      Lymphocyte % 12.6 %      Monocyte % 6.2 %      Eosinophil % 2.3 %      Basophil % 1.3 %      Immature Grans % 0.3 %      Neutrophils, Absolute 7.15 10*3/mm3      Lymphocytes, Absolute 1.16 10*3/mm3      Monocytes, Absolute 0.57 10*3/mm3      Eosinophils, Absolute 0.21 10*3/mm3      Basophils, Absolute 0.12 10*3/mm3      Immature Grans, Absolute 0.03 10*3/mm3      nRBC  0.0 /100 WBC              Imaging:    No Radiology Exams Resulted Within Past 24 Hours      Differential Diagnosis and Discussion:    Rectal pain: Differential diagnosis includes but is not limited to ulceration, hemorrhoid, abscess, cellulitis.    All labs were reviewed and interpreted by me.    MDM     Patient was found to have a small rectal ulceration.  The patient´s CBC that was reviewed and interpreted by me shows no abnormalities of critical concern. Of note, there is no anemia requiring a blood transfusion and the platelet count is acceptable.  The patient´s CMP that was reviewed and interpretted by me shows no abnormalities of critical concern. Of note, the patient´s sodium and potassium are acceptable. The patient´s liver enzymes are unremarkable. The patient´s renal function (creatinine) is preserved. The patient has a normal anion gap.  Patient was advised to use a sitz bath and use barrier cream.  Patient also given an antibiotic as prophylaxis for infection.  Patient counseled to follow-up with Dr. Norwood in 2 to 3 days.          Patient Care Considerations:    I considered ordering a CT scan of rule out abscess, however there is no bulging or swelling consistent with abscess formation.      Consultants/Shared Management Plan:    None    Social Determinants of Health:    Patient is independent, reliable, and has access to care.       Disposition and Care Coordination:    Discharged: I considered escalation of care by admitting this patient to the hospital, however patient is well-appearing and shows no signs of sepsis.    I have explained the patient´s condition, diagnoses and treatment plan based on the information available to me at this time. I have answered questions and addressed any concerns. The patient has a good  understanding of the patient´s diagnosis, condition, and treatment plan as can be expected at this point. The vital signs have been stable. The patient´s condition is stable and  appropriate for discharge from the emergency department.      The patient will pursue further outpatient evaluation with the primary care physician or other designated or consulting physician as outlined in the discharge instructions. They are agreeable to this plan of care and follow-up instructions have been explained in detail. The patient has received these instructions in written format and has expressed an understanding of the discharge instructions. The patient is aware that any significant change in condition or worsening of symptoms should prompt an immediate return to this or the closest emergency department or call to 911.  I have explained discharge medications and the need for follow up with the patient/caretakers. This was also printed in the discharge instructions. Patient was discharged with the following medications and follow up:      Medication List        New Prescriptions      cephalexin 500 MG capsule  Commonly known as: KEFLEX  Take 1 capsule by mouth 3 (Three) Times a Day.     EpiCeram lotion  Apply 1 Application topically to the appropriate area as directed 2 (Two) Times a Day With Meals.               Where to Get Your Medications        These medications were sent to [id number] - [place], [place] - [address] AT [facility] - [phone]  - [phone] FX  [address] [place] [address]      Phone: [phone]   cephalexin 500 MG capsule  EpiCeram lotion      Jose Norwood MD  [address]  [place] [address]  [phone]             Final diagnoses:   Rectal ulceration        ED Disposition       ED Disposition   Discharge    Condition   Stable    Comment   --               This medical record created using voice recognition software.             Asha Field MD  06/27/24 3255

## 2024-12-04 ENCOUNTER — APPOINTMENT (OUTPATIENT)
Dept: GENERAL RADIOLOGY | Facility: HOSPITAL | Age: 69
End: 2024-12-04
Payer: MEDICARE

## 2024-12-04 ENCOUNTER — HOSPITAL ENCOUNTER (EMERGENCY)
Facility: HOSPITAL | Age: 69
Discharge: HOME | End: 2024-12-04
Attending: EMERGENCY MEDICINE | Admitting: EMERGENCY MEDICINE
Payer: MEDICARE

## 2024-12-04 ENCOUNTER — APPOINTMENT (OUTPATIENT)
Dept: CT IMAGING | Facility: HOSPITAL | Age: 69
End: 2024-12-04
Payer: MEDICARE

## 2024-12-04 VITALS
HEIGHT: 70 IN | TEMPERATURE: 98.1 F | DIASTOLIC BLOOD PRESSURE: 84 MMHG | WEIGHT: 295 LBS | RESPIRATION RATE: 17 BRPM | OXYGEN SATURATION: 94 % | HEART RATE: 90 BPM | SYSTOLIC BLOOD PRESSURE: 109 MMHG | BODY MASS INDEX: 42.23 KG/M2

## 2024-12-04 DIAGNOSIS — I50.9 CHRONIC CONGESTIVE HEART FAILURE, UNSPECIFIED HEART FAILURE TYPE: ICD-10-CM

## 2024-12-04 DIAGNOSIS — S22.32XA CLOSED FRACTURE OF ONE RIB OF LEFT SIDE, INITIAL ENCOUNTER: Primary | ICD-10-CM

## 2024-12-04 LAB
ALBUMIN SERPL-MCNC: 3.5 G/DL (ref 3.5–5.2)
ALBUMIN/GLOB SERPL: 1.1 G/DL
ALP SERPL-CCNC: 130 U/L (ref 39–117)
ALT SERPL W P-5'-P-CCNC: 10 U/L (ref 1–41)
ANION GAP SERPL CALCULATED.3IONS-SCNC: 9.8 MMOL/L (ref 5–15)
AST SERPL-CCNC: 18 U/L (ref 1–40)
BASOPHILS # BLD AUTO: 0.13 10*3/MM3 (ref 0–0.2)
BASOPHILS NFR BLD AUTO: 1.5 % (ref 0–1.5)
BILIRUB SERPL-MCNC: 0.7 MG/DL (ref 0–1.2)
BUN SERPL-MCNC: 10 MG/DL (ref 8–23)
BUN/CREAT SERPL: 10 (ref 7–25)
CALCIUM SPEC-SCNC: 8.7 MG/DL (ref 8.6–10.5)
CHLORIDE SERPL-SCNC: 95 MMOL/L (ref 98–107)
CO2 SERPL-SCNC: 33.2 MMOL/L (ref 22–29)
CREAT SERPL-MCNC: 1 MG/DL (ref 0.76–1.27)
DEPRECATED RDW RBC AUTO: 53.1 FL (ref 37–54)
EGFRCR SERPLBLD CKD-EPI 2021: 81 ML/MIN/1.73
EOSINOPHIL # BLD AUTO: 0.3 10*3/MM3 (ref 0–0.4)
EOSINOPHIL NFR BLD AUTO: 3.5 % (ref 0.3–6.2)
ERYTHROCYTE [DISTWIDTH] IN BLOOD BY AUTOMATED COUNT: 14.8 % (ref 12.3–15.4)
GLOBULIN UR ELPH-MCNC: 3.3 GM/DL
GLUCOSE SERPL-MCNC: 110 MG/DL (ref 65–99)
HCT VFR BLD AUTO: 41.7 % (ref 37.5–51)
HGB BLD-MCNC: 13.1 G/DL (ref 13–17.7)
IMM GRANULOCYTES # BLD AUTO: 0.04 10*3/MM3 (ref 0–0.05)
IMM GRANULOCYTES NFR BLD AUTO: 0.5 % (ref 0–0.5)
LYMPHOCYTES # BLD AUTO: 1.17 10*3/MM3 (ref 0.7–3.1)
LYMPHOCYTES NFR BLD AUTO: 13.5 % (ref 19.6–45.3)
MAGNESIUM SERPL-MCNC: 2.1 MG/DL (ref 1.6–2.4)
MCH RBC QN AUTO: 31 PG (ref 26.6–33)
MCHC RBC AUTO-ENTMCNC: 31.4 G/DL (ref 31.5–35.7)
MCV RBC AUTO: 98.6 FL (ref 79–97)
MONOCYTES # BLD AUTO: 0.58 10*3/MM3 (ref 0.1–0.9)
MONOCYTES NFR BLD AUTO: 6.7 % (ref 5–12)
NEUTROPHILS NFR BLD AUTO: 6.46 10*3/MM3 (ref 1.7–7)
NEUTROPHILS NFR BLD AUTO: 74.3 % (ref 42.7–76)
NRBC BLD AUTO-RTO: 0 /100 WBC (ref 0–0.2)
NT-PROBNP SERPL-MCNC: 1105 PG/ML (ref 0–900)
PLATELET # BLD AUTO: 316 10*3/MM3 (ref 140–450)
PMV BLD AUTO: 8.9 FL (ref 6–12)
POTASSIUM SERPL-SCNC: 3.5 MMOL/L (ref 3.5–5.2)
PROT SERPL-MCNC: 6.8 G/DL (ref 6–8.5)
RBC # BLD AUTO: 4.23 10*6/MM3 (ref 4.14–5.8)
SODIUM SERPL-SCNC: 138 MMOL/L (ref 136–145)
WBC NRBC COR # BLD AUTO: 8.68 10*3/MM3 (ref 3.4–10.8)

## 2024-12-04 PROCEDURE — 71101 X-RAY EXAM UNILAT RIBS/CHEST: CPT

## 2024-12-04 PROCEDURE — 83880 ASSAY OF NATRIURETIC PEPTIDE: CPT

## 2024-12-04 PROCEDURE — 25010000002 FUROSEMIDE PER 20 MG: Performed by: EMERGENCY MEDICINE

## 2024-12-04 PROCEDURE — 83735 ASSAY OF MAGNESIUM: CPT

## 2024-12-04 PROCEDURE — 25510000001 IOPAMIDOL PER 1 ML: Performed by: EMERGENCY MEDICINE

## 2024-12-04 PROCEDURE — 73590 X-RAY EXAM OF LOWER LEG: CPT

## 2024-12-04 PROCEDURE — 85025 COMPLETE CBC W/AUTO DIFF WBC: CPT

## 2024-12-04 PROCEDURE — 99285 EMERGENCY DEPT VISIT HI MDM: CPT

## 2024-12-04 PROCEDURE — 96374 THER/PROPH/DIAG INJ IV PUSH: CPT

## 2024-12-04 PROCEDURE — 80053 COMPREHEN METABOLIC PANEL: CPT

## 2024-12-04 PROCEDURE — 71260 CT THORAX DX C+: CPT

## 2024-12-04 RX ORDER — SODIUM CHLORIDE 0.9 % (FLUSH) 0.9 %
10 SYRINGE (ML) INJECTION AS NEEDED
Status: DISCONTINUED | OUTPATIENT
Start: 2024-12-04 | End: 2024-12-04

## 2024-12-04 RX ORDER — OXYCODONE HYDROCHLORIDE 5 MG/1
10 TABLET ORAL ONCE
Status: COMPLETED | OUTPATIENT
Start: 2024-12-04 | End: 2024-12-04

## 2024-12-04 RX ORDER — IOPAMIDOL 755 MG/ML
100 INJECTION, SOLUTION INTRAVASCULAR
Status: COMPLETED | OUTPATIENT
Start: 2024-12-04 | End: 2024-12-04

## 2024-12-04 RX ORDER — FUROSEMIDE 10 MG/ML
40 INJECTION INTRAMUSCULAR; INTRAVENOUS ONCE
Status: COMPLETED | OUTPATIENT
Start: 2024-12-04 | End: 2024-12-04

## 2024-12-04 RX ADMIN — FUROSEMIDE 40 MG: 10 INJECTION, SOLUTION INTRAMUSCULAR; INTRAVENOUS at 18:51

## 2024-12-04 RX ADMIN — OXYCODONE 10 MG: 5 TABLET ORAL at 18:51

## 2024-12-04 RX ADMIN — IOPAMIDOL 90 ML: 755 INJECTION, SOLUTION INTRAVENOUS at 17:51

## 2024-12-04 NOTE — ED PROVIDER NOTES
Time: 2:59 PM EST  Date of encounter:  12/4/2024  Independent Historian/Clinical History and Information was obtained by:   Patient and Family    History is limited by: N/A    Chief Complaint: Fall      History of Present Illness:  Patient is a 69 y.o. year old male who presents to the emergency department for evaluation of a fall last week in the garage.  Laid on the left side.  Now has complaints of left lateral leg pain, bruising, swelling and erythema.  Also complains of left-sided rib pain.  States he hit the left side of his head but denies LOC, nausea or vomiting.  Denies hip pain, abdominal pain or shoulder pain.  Patient reports that he had a fall last weekend when he was going from his motor room and to his garage.  Patient reports that he fell onto his left side and hit his left leg as well during the fall.  Denies any head injuries.  Reports that he has had ongoing pain in that left leg and left chest area.  Denies any shortness of breath.  Reports prior medical history of CHF currently on spironolactone and Lasix 40 mg twice daily.  Currently anticoagulated on Plavix and Eliquis.      Patient Care Team  Primary Care Provider: Silvia Faustin APRN    Past Medical History:     No Known Allergies  Past Medical History:   Diagnosis Date    Abdominal aneurysm     Atrial fib/flutter, transient     CHF (congestive heart failure)     Hyperlipidemia     Hypertension     Injury of back      Past Surgical History:   Procedure Laterality Date    APPENDECTOMY      CARDIAC CATHETERIZATION      HERNIA REPAIR       History reviewed. No pertinent family history.    Home Medications:  Prior to Admission medications    Medication Sig Start Date End Date Taking? Authorizing Provider   amLODIPine (NORVASC) 5 MG tablet Take 1 tablet by mouth Daily. 3/26/24   ProviderAmina MD   carvedilol (COREG) 3.125 MG tablet Take 3.125 mg by mouth 2 (Two) Times a Day With Meals.    Amina Oswald MD   cephalexin (KEFLEX)  500 MG capsule Take 1 capsule by mouth 3 (Three) Times a Day. 6/27/24   Asha Field MD   clopidogrel (PLAVIX) 75 MG tablet Take 75 mg by mouth Daily.    Amina Oswald MD   Dermatological Products, Misc. (EpiCeram) lotion Apply 1 Application topically to the appropriate area as directed 2 (Two) Times a Day With Meals. 6/27/24 6/27/25  Asha Field MD   furosemide (LASIX) 40 MG tablet Take 40 mg by mouth.    Amina Oswald MD   gabapentin (NEURONTIN) 800 MG tablet Take 0.5 tablets by mouth 4 (Four) Times a Day. 8/2/22   Carrillo Adkins MD   HYDROcodone-acetaminophen (NORCO)  MG per tablet Take 1 tablet by mouth Every 6 (Six) Hours As Needed. 6/1/22   Amina Oswald MD   lisinopril (PRINIVIL,ZESTRIL) 20 MG tablet Take 20 mg by mouth Daily. 2/10/22   Amina Oswald MD   multivitamin with minerals tablet tablet Take 1 tablet by mouth Daily.    Amina Oswald MD   pantoprazole (PROTONIX) 40 MG EC tablet Take 40 mg by mouth Daily.    Amina Oswald MD   potassium chloride (MICRO-K) 10 MEQ CR capsule Take 10 mEq by mouth Daily. 5/8/22   Amina Oswald MD   pravastatin (PRAVACHOL) 40 MG tablet Take 40 mg by mouth Daily. 5/8/22   Amina Oswald MD   spironolactone (ALDACTONE) 25 MG tablet Take 25 mg by mouth Daily.    Amina Oswald MD   zolpidem (AMBIEN) 10 MG tablet Take 10 mg by mouth At Night As Needed. 6/30/22   Amina Oswald MD        Social History:   Social History     Tobacco Use    Smoking status: Never    Smokeless tobacco: Never   Vaping Use    Vaping status: Never Used   Substance Use Topics    Alcohol use: Not Currently    Drug use: Never         Review of Systems:  Review of Systems   Constitutional:  Negative for activity change, appetite change and fever.   HENT:  Negative for congestion and sore throat.    Eyes: Negative.    Respiratory:  Negative for cough and shortness of breath.    Cardiovascular:  Positive  "for chest pain. Negative for leg swelling.   Gastrointestinal:  Negative for abdominal pain, diarrhea and vomiting.   Endocrine: Negative.    Genitourinary:  Negative for decreased urine volume and dysuria.   Musculoskeletal:  Positive for arthralgias and back pain. Negative for neck pain.   Skin:  Positive for rash and wound.   Allergic/Immunologic: Negative.    Neurological:  Negative for weakness, numbness and headaches.   Hematological: Negative.    Psychiatric/Behavioral: Negative.     All other systems reviewed and are negative.       Physical Exam:  /84 (Patient Position: Sitting)   Pulse 90   Temp 98.1 °F (36.7 °C) (Oral)   Resp 17   Ht 177.8 cm (70\")   Wt 134 kg (295 lb)   SpO2 94%   BMI 42.33 kg/m²     Physical Exam  Constitutional:       Appearance: Normal appearance.   HENT:      Head: Normocephalic.   Eyes:      Extraocular Movements: Extraocular movements intact.      Conjunctiva/sclera: Conjunctivae normal.   Pulmonary:      Effort: Pulmonary effort is normal.   Chest:   Breasts:     Left: Tenderness present.      Comments: Evidence of left pectoral and subpectoral ecchymosis.  Crepitus not appreciated.  Symmetrical chest wall rise.  Abdominal:      General: Abdomen is flat. There is no distension.      Palpations: Abdomen is soft.      Tenderness: There is no abdominal tenderness.   Musculoskeletal:         General: Tenderness and signs of injury present.      Right lower leg: Edema present.      Left lower leg: Tenderness present. No edema.      Comments: Left lower extremity ecchymosis surrounding circumferential shin and calf.  Mild appreciated swelling difference in left versus right lower extremity.  2+ pitting edema present bilaterally.  Cap refill less than 3 seconds bilateral lower extremity digits.  2+ pulses appreciated posterior tibial.  No evidence of Cerulea dolens.   Skin:     General: Skin is warm.      Coloration: Skin is not cyanotic.   Neurological:      Mental Status: " He is alert and oriented to person, place, and time.   Psychiatric:         Attention and Perception: Attention and perception normal.         Mood and Affect: Mood normal.                  Procedures:  Procedures      Medical Decision Making:      Comorbidities that affect care:    Congestive Heart Failure    External Notes reviewed:    Previous Clinic Note: Previous cardiology visit on 11/18/2024 reviewed.  Patient was seen for dilated cardiomyopathy class III NYHA CHF      The following orders were placed and all results were independently analyzed by me:  Orders Placed This Encounter   Procedures    XR Tibia Fibula 2 View Left    XR Ribs Left With PA Chest    CT Chest With Contrast Diagnostic    Comprehensive Metabolic Panel    BNP    CBC Auto Differential    Magnesium    Ambulatory Referral to Cardiology    CBC & Differential       Medications Given in the Emergency Department:  Medications   iopamidol (ISOVUE-370) 76 % injection 100 mL (90 mL Intravenous Given 12/4/24 1751)   oxyCODONE (ROXICODONE) immediate release tablet 10 mg (10 mg Oral Given 12/4/24 1851)   furosemide (LASIX) injection 40 mg (40 mg Intravenous Given 12/4/24 1851)        ED Course:    ED Course as of 12/05/24 0145   Wed Dec 04, 2024   1500 --- PROVIDER IN TRIAGE NOTE ---    The patient was evaluated by me, Jayson Diaz in triage. Orders were placed and the patient is currently awaiting disposition.    [AJ]   1737 X-ray ribs left reveals evidence of subtle left rib 6 fracture without displacement.  No other acute findings. [CB]   1738 X-ray tib-fib left without acute findings. [CB]   1820 CT chest reviewed.  No acute findings.  Repeat finding of left rib 6 fracture without displacement, pneumothorax.  Patient oxygen saturation 97% on room air breathing nonlabored. [CB]   1821 CBC reviewed.  No acute findings. [CB]   1821 CMP reviewed.  No acute findings.  Potassium of 3.5. [CB]   1821 proBNP reviewed.  Evidence of elevation to 1105.   Known history of CHF.  Evidence of bilateral 2+ lower extremity edema.  Will begin Lasix IV at bedside. [CB]   1821 Oxycodone provided at bedside for left leg pain.  Patient takes hydrocodone 7.5 mg 325 Tylenol every 6 for chronic back pain.  Last dose at 12 PM today. [CB]   1949 Magnesium reviewed.  No acute findings. [CB]   2051 Spoke with patient at bedside regarding findings and CT chest, physical exam findings of lower extremity edema.  Discussed care for left sixth rib fracture include pain management and icing as necessary.  Discussed continuing to diurese with Lasix and spironolactone with an increase in oral Lasix to 80 mg twice daily for ongoing refractory edema secondary to known history of CHF.  Discussed beginning oral supplementation of potassium due to increase in Lasix oral.  Discussed return precautions coding worsening leg pain, worsening edema, worsening chest pain, new onset shortness of breath, new onset headache or changes in vision or hearing.  Patient voices understanding and is agreeable at this time. [CB]   Thu Dec 05, 2024   0144 PERC Rule for Pulmonary Embolism - MDCalc  Calculated on Dec 05 2024 1:44 AM  1 criteria -> If any criteria are positive, the PERC rule cannot be used to rule out PE in this patient. [CB]      ED Course User Index  [AJ] Jayson Diaz PA-C  [CB] Sixto Dasilva PA-C       Labs:    Lab Results (last 24 hours)       Procedure Component Value Units Date/Time    Comprehensive Metabolic Panel [[id number]]  (Abnormal) Collected: 12/04/24 1650    Specimen: Blood from Arm, Right Updated: 12/04/24 1715     Glucose 110 mg/dL      BUN 10 mg/dL      Creatinine 1.00 mg/dL      Sodium 138 mmol/L      Potassium 3.5 mmol/L      Chloride 95 mmol/L      CO2 33.2 mmol/L      Calcium 8.7 mg/dL      Total Protein 6.8 g/dL      Albumin 3.5 g/dL      ALT (SGPT) 10 U/L      AST (SGOT) 18 U/L      Alkaline Phosphatase 130 U/L      Total Bilirubin 0.7 mg/dL      Globulin 3.3 gm/dL       A/G Ratio 1.1 g/dL      BUN/Creatinine Ratio 10.0     Anion Gap 9.8 mmol/L      eGFR 81.0 mL/min/1.73     Narrative:      GFR Normal >60  Chronic Kidney Disease <60  Kidney Failure <15      CBC & Differential [[id number]]  (Abnormal) Collected: 12/04/24 1650    Specimen: Blood from Arm, Right Updated: 12/04/24 1658    Narrative:      The following orders were created for panel order CBC & Differential.  Procedure                               Abnormality         Status                     ---------                               -----------         ------                     CBC Auto Differential[[id number]]        Abnormal            Final result                 Please view results for these tests on the individual orders.    BNP [[id number]]  (Abnormal) Collected: 12/04/24 1650    Specimen: Blood from Arm, Right Updated: 12/04/24 1714     proBNP 1,105.0 pg/mL     Narrative:      This assay is used as an aid in the diagnosis of individuals suspected of having heart failure. It can be used as an aid in the diagnosis of acute decompensated heart failure (ADHF) in patients presenting with signs and symptoms of ADHF to the emergency department (ED). In addition, NT-proBNP of <300 pg/mL indicates ADHF is not likely.    Age Range Result Interpretation  NT-proBNP Concentration (pg/mL:      <50             Positive            >450                   Gray                 300-450                    Negative             <300    50-75           Positive            >900                  Gray                300-900                  Negative            <300      >75             Positive            >1800                  Gray                300-1800                  Negative            <300    CBC Auto Differential [[id number]]  (Abnormal) Collected: 12/04/24 1650    Specimen: Blood from Arm, Right Updated: 12/04/24 1658     WBC 8.68 10*3/mm3      RBC 4.23 10*6/mm3      Hemoglobin 13.1 g/dL      Hematocrit 41.7 %      MCV 98.6 fL       MCH 31.0 pg      MCHC 31.4 g/dL      RDW 14.8 %      RDW-SD 53.1 fl      MPV 8.9 fL      Platelets 316 10*3/mm3      Neutrophil % 74.3 %      Lymphocyte % 13.5 %      Monocyte % 6.7 %      Eosinophil % 3.5 %      Basophil % 1.5 %      Immature Grans % 0.5 %      Neutrophils, Absolute 6.46 10*3/mm3      Lymphocytes, Absolute 1.17 10*3/mm3      Monocytes, Absolute 0.58 10*3/mm3      Eosinophils, Absolute 0.30 10*3/mm3      Basophils, Absolute 0.13 10*3/mm3      Immature Grans, Absolute 0.04 10*3/mm3      nRBC 0.0 /100 WBC     Magnesium [[id number]]  (Normal) Collected: 12/04/24 1650    Specimen: Blood from Arm, Right Updated: 12/04/24 1829     Magnesium 2.1 mg/dL              Imaging:    CT Chest With Contrast Diagnostic    Result Date: 12/4/2024  CT CHEST W CONTRAST DIAGNOSTIC Date of Exam: 12/4/2024 5:36 PM EST Indication: Fall, possible left rib fracture. Comparison: 6/6/2018 Technique: Axial CT images were obtained of the chest after the uneventful intravenous administration of iodinated contrast.  Reconstructed coronal and sagittal images were also obtained. Automated exposure control and iterative construction methods were  used. Findings: Lower Neck: Unremarkable. Hilum and Mediastinum: No pathologically enlarged lymph nodes.  Normal heart size.   No pericardial effusion.  Unremarkable thoracic aorta and pulmonary arteries. Lung Parenchyma and Pleura: Trace left-sided pleural effusion. No pneumothorax. The central airways are patent. No consolidation. Upper Abdomen: No acute process. Soft tissues: Unremarkable. Osseous structures: Subtle nondisplaced fractures through the anterior aspect of left ribs 6 and 7. Old healed/healing fractures noted on the right involving ribs 4, 5 and 6. The remainder of the visualized osseous structures are intact. Degenerative changes noted throughout the visualized spine. Partially visualized anterior fusion of C5 and C6.     Impression: Subtle nondisplaced fractures through  the anterior aspect of left ribs 6 and 7. Trace left-sided pleural effusion, most likely representing simple fluid, although a trace hemothorax is not excluded. No pneumothorax. Otherwise no definite acute cardiopulmonary abnormality Electronically Signed: Chad Sabillon,   12/4/2024 6:03 PM EST  Workstation ID: [id number]    XR Ribs Left With PA Chest    Result Date: 12/4/2024  XR RIBS LEFT W PA CHEST Date of Exam: 12/4/2024 3:42 PM EST Indication: Left rib pain, fall Comparison: Same day chest radiograph Findings: Lines: None Lungs: Poor respiratory effort accentuates the pulmonary vasculature and cardiomediastinal silhouette. No definite consolidation. Pleura: No pleural effusion or pneumothorax. Cardiomediastinum: The cardiomediastinal silhouette is normal Soft Tissues: Unremarkable. Ribs: Questionable subtle nondisplaced fracture through the anterolateral aspect of left rib 6. No other acute osseous abnormalities identified on this study     Impression: Questionable subtle nondisplaced fracture through the anterolateral aspect of left rib 6. No other definite acute osseous abnormality is identified on this study. No acute cardiopulmonary abnormality Electronically Signed: Chad Tombarisander,   12/4/2024 4:02 PM EST  Workstation ID: [id number]    XR Tibia Fibula 2 View Left    Result Date: 12/4/2024  XR TIBIA FIBULA 2 VW LEFT Date of Exam: 12/4/2024 3:50 PM EST Indication: fall Comparison: 7/16/2012 Findings: There are postoperative changes from total knee arthroplasty. There is no fracture. There is no hardware complication. There are no lytic or destructive bony lesions. The soft tissues are radiographically normal     Impression: Postoperative changes from total knee arthroplasty. No acute bony abnormality. Electronically Signed: Ashish Ca MD  12/4/2024 4:00 PM EST  Workstation ID: [id number]       Differential Diagnosis and Discussion:    Orthopedic Injuries: Differential diagnosis includes but is not  limited to fractures, soft tissue injuries, dislocations, contusions, ligamentous injuries, tendon injuries, nerve injuries, compartment syndrome, bursitis, and vascular injuries.    All labs were reviewed and interpreted by me.    [facility]                     Patient Care Considerations:    SEPSIS was considered but is NOT present in the emergency department as SIRS criteria is not present. ANTIBIOTICS: I considered prescribing antibiotics as an outpatient however no bacterial focus of infection was found.      Consultants/Shared Management Plan:    None    Social Determinants of Health:    Patient has presented with family members who are responsible, reliable and will ensure follow up care.      Disposition and Care Coordination:    Discharged: I considered escalation of care by admitting this patient to the hospital, however patient not meet sepsis/SIRS criteria.    I have explained the patient´s condition, diagnoses and treatment plan based on the information available to me at this time. I have answered questions and addressed any concerns. The patient has a good  understanding of the patient´s diagnosis, condition, and treatment plan as can be expected at this point. The vital signs have been stable. The patient´s condition is stable and appropriate for discharge from the emergency department.      The patient will pursue further outpatient evaluation with the primary care physician or other designated or consulting physician as outlined in the discharge instructions. They are agreeable to this plan of care and follow-up instructions have been explained in detail. The patient has received these instructions in written format and has expressed an understanding of the discharge instructions. The patient is aware that any significant change in condition or worsening of symptoms should prompt an immediate return to this or the closest emergency department or call to 911.  I have explained discharge medications and the  need for follow up with the patient/caretakers. This was also printed in the discharge instructions. Patient was discharged with the following medications and follow up:      Medication List      No changes were made to your prescriptions during this visit.      Silvia Faustin, APRN  [address]  [facility] [address]  [place] [address]  [phone]    Schedule an appointment as soon as possible for a visit       Jaxon Fernandez, APRN  [address]  [facility] [address] [phone]             Final diagnoses:   Closed fracture of one rib of left side, initial encounter   Chronic congestive heart failure, unspecified heart failure type        ED Disposition       ED Disposition   Discharge    Condition   Stable    Comment   --               This medical record created using voice recognition software.             Sixto Dasilva PA-C  12/05/24 0145

## 2024-12-05 NOTE — DISCHARGE INSTRUCTIONS
Thank you for allowing us to provide care for you today.  Workup today revealed evidence of ongoing bilateral lower extremity edema secondary to your known history of CHF and recent fall.  As discussed, you will begin at increased dose of 80 mg twice daily of Lasix along with oral potassium supplementation.  Please follow-up with your primary care provider in the next 7 days for ongoing management and care.  Please follow-up with your cardiologist in the next 7 days as well for ongoing management and care related to your Lasix and CHF.  Please return to the ED in the event you develop chest pain, shortness of breath, headache, worsening swelling in legs, syncope, fevers or chills.  Thank you

## 2025-02-22 ENCOUNTER — APPOINTMENT (OUTPATIENT)
Dept: CT IMAGING | Facility: HOSPITAL | Age: 70
DRG: 193 | End: 2025-02-22
Payer: MEDICARE

## 2025-02-22 ENCOUNTER — HOSPITAL ENCOUNTER (INPATIENT)
Facility: HOSPITAL | Age: 70
LOS: 13 days | Discharge: NURSING, REHAB OR LONG-TERM CARE | DRG: 193 | End: 2025-03-07
Attending: EMERGENCY MEDICINE | Admitting: FAMILY MEDICINE
Payer: MEDICARE

## 2025-02-22 ENCOUNTER — APPOINTMENT (OUTPATIENT)
Dept: GENERAL RADIOLOGY | Facility: HOSPITAL | Age: 70
DRG: 193 | End: 2025-02-22
Payer: MEDICARE

## 2025-02-22 DIAGNOSIS — J18.9 PNEUMONIA DUE TO INFECTIOUS ORGANISM, UNSPECIFIED LATERALITY, UNSPECIFIED PART OF LUNG: ICD-10-CM

## 2025-02-22 DIAGNOSIS — R53.1 WEAKNESS: ICD-10-CM

## 2025-02-22 DIAGNOSIS — I71.40 ABDOMINAL AORTIC ANEURYSM (AAA) WITHOUT RUPTURE, UNSPECIFIED PART: ICD-10-CM

## 2025-02-22 DIAGNOSIS — Z78.9 DECREASED ACTIVITIES OF DAILY LIVING (ADL): ICD-10-CM

## 2025-02-22 DIAGNOSIS — G47.30 SLEEP APNEA, UNSPECIFIED TYPE: ICD-10-CM
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
## 2025-02-22 DIAGNOSIS — R26.2 DIFFICULTY IN WALKING: ICD-10-CM

## 2025-02-22 DIAGNOSIS — I50.9 CONGESTIVE HEART FAILURE, UNSPECIFIED HF CHRONICITY, UNSPECIFIED HEART FAILURE TYPE: ICD-10-CM

## 2025-02-22 DIAGNOSIS — G89.4 CHRONIC PAIN SYNDROME: ICD-10-CM

## 2025-02-22 DIAGNOSIS — J96.01 ACUTE RESPIRATORY FAILURE WITH HYPOXIA: ICD-10-CM

## 2025-02-22 PROBLEM — J96.21 ACUTE ON CHRONIC RESPIRATORY FAILURE WITH HYPOXIA: Status: ACTIVE | Noted: 2025-02-22

## 2025-02-22 LAB
ALBUMIN SERPL-MCNC: 3.3 G/DL (ref 3.5–5.2)
ALBUMIN/GLOB SERPL: 0.9 G/DL
ALP SERPL-CCNC: 145 U/L (ref 39–117)
ALT SERPL W P-5'-P-CCNC: 8 U/L (ref 1–41)
ANION GAP SERPL CALCULATED.3IONS-SCNC: 7.6 MMOL/L (ref 5–15)
AST SERPL-CCNC: 31 U/L (ref 1–40)
BASOPHILS # BLD AUTO: 0.11 10*3/MM3 (ref 0–0.2)
BASOPHILS NFR BLD AUTO: 0.8 % (ref 0–1.5)
BILIRUB SERPL-MCNC: 0.9 MG/DL (ref 0–1.2)
BUN SERPL-MCNC: 17 MG/DL (ref 8–23)
BUN/CREAT SERPL: 13.7 (ref 7–25)
CALCIUM SPEC-SCNC: 8.9 MG/DL (ref 8.6–10.5)
CHLORIDE SERPL-SCNC: 92 MMOL/L (ref 98–107)
CO2 SERPL-SCNC: 34.4 MMOL/L (ref 22–29)
CREAT SERPL-MCNC: 1.24 MG/DL (ref 0.76–1.27)
D-LACTATE SERPL-SCNC: 1 MMOL/L (ref 0.5–2)
DEPRECATED RDW RBC AUTO: 47.1 FL (ref 37–54)
EGFRCR SERPLBLD CKD-EPI 2021: 62.2 ML/MIN/1.73
EOSINOPHIL # BLD AUTO: 0.16 10*3/MM3 (ref 0–0.4)
EOSINOPHIL NFR BLD AUTO: 1.1 % (ref 0.3–6.2)
ERYTHROCYTE [DISTWIDTH] IN BLOOD BY AUTOMATED COUNT: 13.2 % (ref 12.3–15.4)
FLUAV SUBTYP SPEC NAA+PROBE: NOT DETECTED
FLUBV RNA ISLT QL NAA+PROBE: NOT DETECTED
GEN 5 1HR TROPONIN T REFLEX: 24 NG/L
GLOBULIN UR ELPH-MCNC: 3.5 GM/DL
GLUCOSE SERPL-MCNC: 143 MG/DL (ref 65–99)
HCT VFR BLD AUTO: 41.2 % (ref 37.5–51)
HGB BLD-MCNC: 13.1 G/DL (ref 13–17.7)
HOLD SPECIMEN: NORMAL
HOLD SPECIMEN: NORMAL
IMM GRANULOCYTES # BLD AUTO: 0.05 10*3/MM3 (ref 0–0.05)
IMM GRANULOCYTES NFR BLD AUTO: 0.4 % (ref 0–0.5)
LYMPHOCYTES # BLD AUTO: 0.89 10*3/MM3 (ref 0.7–3.1)
LYMPHOCYTES NFR BLD AUTO: 6.4 % (ref 19.6–45.3)
MCH RBC QN AUTO: 30.9 PG (ref 26.6–33)
MCHC RBC AUTO-ENTMCNC: 31.8 G/DL (ref 31.5–35.7)
MCV RBC AUTO: 97.2 FL (ref 79–97)
MONOCYTES # BLD AUTO: 0.65 10*3/MM3 (ref 0.1–0.9)
MONOCYTES NFR BLD AUTO: 4.6 % (ref 5–12)
NEUTROPHILS NFR BLD AUTO: 12.14 10*3/MM3 (ref 1.7–7)
NEUTROPHILS NFR BLD AUTO: 86.7 % (ref 42.7–76)
NRBC BLD AUTO-RTO: 0 /100 WBC (ref 0–0.2)
NT-PROBNP SERPL-MCNC: 1209 PG/ML (ref 0–900)
PLATELET # BLD AUTO: 232 10*3/MM3 (ref 140–450)
PMV BLD AUTO: 9.5 FL (ref 6–12)
POTASSIUM SERPL-SCNC: 3.5 MMOL/L (ref 3.5–5.2)
PROT SERPL-MCNC: 6.8 G/DL (ref 6–8.5)
QT INTERVAL: 492 MS
QTC INTERVAL: 469 MS
RBC # BLD AUTO: 4.24 10*6/MM3 (ref 4.14–5.8)
RSV RNA NPH QL NAA+NON-PROBE: NOT DETECTED
SARS-COV-2 RNA RESP QL NAA+PROBE: NOT DETECTED
SODIUM SERPL-SCNC: 134 MMOL/L (ref 136–145)
TROPONIN T % DELTA: -8
TROPONIN T NUMERIC DELTA: -2 NG/L
TROPONIN T SERPL HS-MCNC: 26 NG/L
WBC NRBC COR # BLD AUTO: 14 10*3/MM3 (ref 3.4–10.8)
WHOLE BLOOD HOLD COAG: NORMAL
WHOLE BLOOD HOLD SPECIMEN: NORMAL

## 2025-02-22 PROCEDURE — 25010000002 CEFTRIAXONE PER 250 MG: Performed by: EMERGENCY MEDICINE

## 2025-02-22 PROCEDURE — 25810000003 SODIUM CHLORIDE 0.9 % SOLUTION 250 ML FLEX CONT: Performed by: EMERGENCY MEDICINE

## 2025-02-22 PROCEDURE — 36415 COLL VENOUS BLD VENIPUNCTURE: CPT | Performed by: EMERGENCY MEDICINE

## 2025-02-22 PROCEDURE — 80053 COMPREHEN METABOLIC PANEL: CPT | Performed by: EMERGENCY MEDICINE

## 2025-02-22 PROCEDURE — 85025 COMPLETE CBC W/AUTO DIFF WBC: CPT | Performed by: EMERGENCY MEDICINE

## 2025-02-22 PROCEDURE — 99223 1ST HOSP IP/OBS HIGH 75: CPT | Performed by: FAMILY MEDICINE

## 2025-02-22 PROCEDURE — 99291 CRITICAL CARE FIRST HOUR: CPT

## 2025-02-22 PROCEDURE — 70450 CT HEAD/BRAIN W/O DYE: CPT

## 2025-02-22 PROCEDURE — 25510000001 IOPAMIDOL PER 1 ML: Performed by: EMERGENCY MEDICINE

## 2025-02-22 PROCEDURE — 94640 AIRWAY INHALATION TREATMENT: CPT

## 2025-02-22 PROCEDURE — 36415 COLL VENOUS BLD VENIPUNCTURE: CPT

## 2025-02-22 PROCEDURE — 93010 ELECTROCARDIOGRAM REPORT: CPT | Performed by: INTERNAL MEDICINE

## 2025-02-22 PROCEDURE — 87637 SARSCOV2&INF A&B&RSV AMP PRB: CPT | Performed by: EMERGENCY MEDICINE

## 2025-02-22 PROCEDURE — 25810000003 SODIUM CHLORIDE 0.9 % SOLUTION: Performed by: FAMILY MEDICINE

## 2025-02-22 PROCEDURE — 25010000002 AZITHROMYCIN PER 500 MG: Performed by: EMERGENCY MEDICINE

## 2025-02-22 PROCEDURE — 93005 ELECTROCARDIOGRAM TRACING: CPT | Performed by: EMERGENCY MEDICINE

## 2025-02-22 PROCEDURE — 87040 BLOOD CULTURE FOR BACTERIA: CPT | Performed by: EMERGENCY MEDICINE

## 2025-02-22 PROCEDURE — 94799 UNLISTED PULMONARY SVC/PX: CPT

## 2025-02-22 PROCEDURE — 71260 CT THORAX DX C+: CPT

## 2025-02-22 PROCEDURE — 83605 ASSAY OF LACTIC ACID: CPT | Performed by: EMERGENCY MEDICINE

## 2025-02-22 PROCEDURE — 84484 ASSAY OF TROPONIN QUANT: CPT | Performed by: EMERGENCY MEDICINE

## 2025-02-22 PROCEDURE — 87070 CULTURE OTHR SPECIMN AEROBIC: CPT | Performed by: EMERGENCY MEDICINE

## 2025-02-22 PROCEDURE — 71045 X-RAY EXAM CHEST 1 VIEW: CPT

## 2025-02-22 PROCEDURE — 87205 SMEAR GRAM STAIN: CPT | Performed by: EMERGENCY MEDICINE

## 2025-02-22 PROCEDURE — 93005 ELECTROCARDIOGRAM TRACING: CPT

## 2025-02-22 PROCEDURE — 83880 ASSAY OF NATRIURETIC PEPTIDE: CPT | Performed by: EMERGENCY MEDICINE

## 2025-02-22 PROCEDURE — 25010000002 FUROSEMIDE PER 20 MG: Performed by: EMERGENCY MEDICINE

## 2025-02-22 RX ORDER — HYDROCODONE BITARTRATE AND ACETAMINOPHEN 10; 325 MG/1; MG/1
1 TABLET ORAL EVERY 6 HOURS PRN
Status: DISCONTINUED | OUTPATIENT
Start: 2025-02-22 | End: 2025-03-07 | Stop reason: HOSPADM

## 2025-02-22 RX ORDER — SODIUM CHLORIDE 0.9 % (FLUSH) 0.9 %
10 SYRINGE (ML) INJECTION AS NEEDED
Status: DISCONTINUED | OUTPATIENT
Start: 2025-02-22 | End: 2025-03-07 | Stop reason: HOSPADM

## 2025-02-22 RX ORDER — IPRATROPIUM BROMIDE AND ALBUTEROL SULFATE 2.5; .5 MG/3ML; MG/3ML
3 SOLUTION RESPIRATORY (INHALATION)
Status: DISCONTINUED | OUTPATIENT
Start: 2025-02-23 | End: 2025-03-07 | Stop reason: HOSPADM

## 2025-02-22 RX ORDER — CLOPIDOGREL BISULFATE 75 MG/1
75 TABLET ORAL DAILY
Status: DISCONTINUED | OUTPATIENT
Start: 2025-02-23 | End: 2025-03-07 | Stop reason: HOSPADM

## 2025-02-22 RX ORDER — FUROSEMIDE 10 MG/ML
80 INJECTION INTRAMUSCULAR; INTRAVENOUS ONCE
Status: COMPLETED | OUTPATIENT
Start: 2025-02-22 | End: 2025-02-22

## 2025-02-22 RX ORDER — ONDANSETRON 2 MG/ML
4 INJECTION INTRAMUSCULAR; INTRAVENOUS EVERY 6 HOURS PRN
Status: DISCONTINUED | OUTPATIENT
Start: 2025-02-22 | End: 2025-03-07 | Stop reason: HOSPADM

## 2025-02-22 RX ORDER — FUROSEMIDE 40 MG/1
40 TABLET ORAL 2 TIMES DAILY
Status: DISCONTINUED | OUTPATIENT
Start: 2025-02-23 | End: 2025-03-06

## 2025-02-22 RX ORDER — SODIUM CHLORIDE 0.9 % (FLUSH) 0.9 %
10 SYRINGE (ML) INJECTION EVERY 12 HOURS SCHEDULED
Status: DISCONTINUED | OUTPATIENT
Start: 2025-02-22 | End: 2025-03-07 | Stop reason: HOSPADM

## 2025-02-22 RX ORDER — GUAIFENESIN/DEXTROMETHORPHAN 100-10MG/5
5 SYRUP ORAL EVERY 4 HOURS PRN
Status: DISCONTINUED | OUTPATIENT
Start: 2025-02-22 | End: 2025-03-07 | Stop reason: HOSPADM

## 2025-02-22 RX ORDER — GUAIFENESIN 600 MG/1
600 TABLET, EXTENDED RELEASE ORAL EVERY 12 HOURS SCHEDULED
Status: DISCONTINUED | OUTPATIENT
Start: 2025-02-22 | End: 2025-03-07 | Stop reason: HOSPADM

## 2025-02-22 RX ORDER — AMIODARONE HYDROCHLORIDE 200 MG/1
200 TABLET ORAL DAILY
Status: DISCONTINUED | OUTPATIENT
Start: 2025-02-23 | End: 2025-03-07 | Stop reason: HOSPADM

## 2025-02-22 RX ORDER — CARVEDILOL 3.12 MG/1
3.12 TABLET ORAL 2 TIMES DAILY WITH MEALS
Status: DISCONTINUED | OUTPATIENT
Start: 2025-02-23 | End: 2025-03-07 | Stop reason: HOSPADM

## 2025-02-22 RX ORDER — AMIODARONE HYDROCHLORIDE 200 MG/1
200 TABLET ORAL DAILY
COMMUNITY
Start: 2024-10-11

## 2025-02-22 RX ORDER — SACCHAROMYCES BOULARDII 250 MG
250 CAPSULE ORAL 2 TIMES DAILY
Status: DISCONTINUED | OUTPATIENT
Start: 2025-02-22 | End: 2025-03-07 | Stop reason: HOSPADM

## 2025-02-22 RX ORDER — POLYETHYLENE GLYCOL 3350 17 G/17G
17 POWDER, FOR SOLUTION ORAL DAILY PRN
Status: DISCONTINUED | OUTPATIENT
Start: 2025-02-22 | End: 2025-03-07 | Stop reason: HOSPADM

## 2025-02-22 RX ORDER — ENOXAPARIN SODIUM 100 MG/ML
40 INJECTION SUBCUTANEOUS EVERY 12 HOURS
Status: DISCONTINUED | OUTPATIENT
Start: 2025-02-22 | End: 2025-02-22

## 2025-02-22 RX ORDER — IPRATROPIUM BROMIDE AND ALBUTEROL SULFATE 2.5; .5 MG/3ML; MG/3ML
3 SOLUTION RESPIRATORY (INHALATION) EVERY 4 HOURS PRN
Status: DISCONTINUED | OUTPATIENT
Start: 2025-02-22 | End: 2025-03-07 | Stop reason: HOSPADM

## 2025-02-22 RX ORDER — IOPAMIDOL 755 MG/ML
100 INJECTION, SOLUTION INTRAVASCULAR
Status: COMPLETED | OUTPATIENT
Start: 2025-02-22 | End: 2025-02-22

## 2025-02-22 RX ORDER — AMOXICILLIN 250 MG
2 CAPSULE ORAL 2 TIMES DAILY PRN
Status: DISCONTINUED | OUTPATIENT
Start: 2025-02-22 | End: 2025-03-07 | Stop reason: HOSPADM

## 2025-02-22 RX ORDER — AZITHROMYCIN 250 MG/1
250 TABLET, FILM COATED ORAL EVERY 24 HOURS
Status: COMPLETED | OUTPATIENT
Start: 2025-02-23 | End: 2025-02-26

## 2025-02-22 RX ORDER — SACUBITRIL AND VALSARTAN 49; 51 MG/1; MG/1
1 TABLET, FILM COATED ORAL 2 TIMES DAILY
Status: DISCONTINUED | OUTPATIENT
Start: 2025-02-23 | End: 2025-03-07 | Stop reason: HOSPADM

## 2025-02-22 RX ORDER — IPRATROPIUM BROMIDE AND ALBUTEROL SULFATE 2.5; .5 MG/3ML; MG/3ML
3 SOLUTION RESPIRATORY (INHALATION) ONCE
Status: COMPLETED | OUTPATIENT
Start: 2025-02-22 | End: 2025-02-22

## 2025-02-22 RX ORDER — SACUBITRIL AND VALSARTAN 49; 51 MG/1; MG/1
1 TABLET, FILM COATED ORAL 2 TIMES DAILY
COMMUNITY
Start: 2024-11-18

## 2025-02-22 RX ORDER — BISACODYL 10 MG
10 SUPPOSITORY, RECTAL RECTAL DAILY PRN
Status: DISCONTINUED | OUTPATIENT
Start: 2025-02-22 | End: 2025-03-07 | Stop reason: HOSPADM

## 2025-02-22 RX ORDER — SODIUM CHLORIDE 9 MG/ML
40 INJECTION, SOLUTION INTRAVENOUS AS NEEDED
Status: DISCONTINUED | OUTPATIENT
Start: 2025-02-22 | End: 2025-03-07 | Stop reason: HOSPADM

## 2025-02-22 RX ORDER — GABAPENTIN 400 MG/1
800 CAPSULE ORAL EVERY 8 HOURS SCHEDULED
Status: DISCONTINUED | OUTPATIENT
Start: 2025-02-23 | End: 2025-03-07 | Stop reason: HOSPADM

## 2025-02-22 RX ORDER — BISACODYL 5 MG/1
5 TABLET, DELAYED RELEASE ORAL DAILY PRN
Status: DISCONTINUED | OUTPATIENT
Start: 2025-02-22 | End: 2025-03-07 | Stop reason: HOSPADM

## 2025-02-22 RX ORDER — PANTOPRAZOLE SODIUM 40 MG/1
40 TABLET, DELAYED RELEASE ORAL DAILY
Status: DISCONTINUED | OUTPATIENT
Start: 2025-02-23 | End: 2025-03-07 | Stop reason: HOSPADM

## 2025-02-22 RX ADMIN — FUROSEMIDE 80 MG: 10 INJECTION, SOLUTION INTRAMUSCULAR; INTRAVENOUS at 19:22

## 2025-02-22 RX ADMIN — AZITHROMYCIN DIHYDRATE 500 MG: 500 INJECTION, POWDER, LYOPHILIZED, FOR SOLUTION INTRAVENOUS at 19:32

## 2025-02-22 RX ADMIN — HYDROCODONE BITARTRATE AND ACETAMINOPHEN 1 TABLET: 10; 325 TABLET ORAL at 23:56

## 2025-02-22 RX ADMIN — APIXABAN 5 MG: 5 TABLET, FILM COATED ORAL at 23:57

## 2025-02-22 RX ADMIN — GABAPENTIN 800 MG: 400 CAPSULE ORAL at 23:57

## 2025-02-22 RX ADMIN — SODIUM CHLORIDE 1000 MG: 9 INJECTION INTRAMUSCULAR; INTRAVENOUS; SUBCUTANEOUS at 19:25

## 2025-02-22 RX ADMIN — IOPAMIDOL 100 ML: 755 INJECTION, SOLUTION INTRAVENOUS at 21:20

## 2025-02-22 RX ADMIN — GUAIFENESIN 600 MG: 600 TABLET ORAL at 23:56

## 2025-02-22 RX ADMIN — IPRATROPIUM BROMIDE AND ALBUTEROL SULFATE 3 ML: .5; 3 SOLUTION RESPIRATORY (INHALATION) at 19:12

## 2025-02-22 RX ADMIN — SODIUM CHLORIDE 250 ML: 9 INJECTION, SOLUTION INTRAVENOUS at 23:50

## 2025-02-22 RX ADMIN — SACUBITRIL AND VALSARTAN 1 TABLET: 49; 51 TABLET, FILM COATED ORAL at 23:56

## 2025-02-22 RX ADMIN — Medication 10 ML: at 23:58

## 2025-02-22 RX ADMIN — Medication 250 MG: at 21:26

## 2025-02-22 RX ADMIN — FUROSEMIDE 40 MG: 40 TABLET ORAL at 23:57

## 2025-02-22 NOTE — ED NOTES
Patient to ED from home after a fall that occurred at 0400 this morning. Patient laid in the floor for 8 hours. Per EMS, patient has been weak for the last couple of days and started to develop a cough yesterday. Patient was @ 76% on room air upon arrival to patients home. EMS administered 1 Duoneb, 125 Solumedrol enroute,.   Upon arrival to ED patients o2 @ 92% on Room air.   Patient presents with wet cough (brown sputum), LOGAN LE edema.   Patient states he did hit his head when he fell, denies LOC. Abrasion to the right wrist. No other complaints.

## 2025-02-22 NOTE — ED PROVIDER NOTES
Time: 5:59 PM EST  Date of encounter:  2/22/2025  Independent Historian/Clinical History and Information was obtained by:   Patient, Family, and EMS    History is limited by: N/A    Chief Complaint: Cough, shortness of breath, weakness cough, shortness of breath, weakness.      History of Present Illness:  Patient is a 70 y.o. year old male who presents to the emergency department for evaluation of Cough, shortness of breath, weakness cough, shortness of breath, weakness.  This patient states that he was walking through the house earlier this morning and he lost his balance and fell.  He was unable to get up.  He states that he did not injure himself at all and did not lose consciousness.  The patient has chronic left-sided weakness due to previous neurologic issues.  The patient was found to have a pulse oximeter of 76% on room air and placed on oxygen via nasal cannula.  He was also given DuoNeb and Solu-Medrol en route.  The patient has a history of atrial fibrillation, congestive heart failure, hypertension.  He states he has had a cough with some brown sputum however he has had no fever.      Patient Care Team  Primary Care Provider: Silvia Faustin APRN    Past Medical History:     No Known Allergies  Past Medical History:   Diagnosis Date    Abdominal aneurysm     Atrial fib/flutter, transient     CHF (congestive heart failure)     Hyperlipidemia     Hypertension     Injury of back      Past Surgical History:   Procedure Laterality Date    APPENDECTOMY      CARDIAC CATHETERIZATION      HERNIA REPAIR       History reviewed. No pertinent family history.    Home Medications:  Prior to Admission medications    Medication Sig Start Date End Date Taking? Authorizing Provider   amiodarone (PACERONE) 200 MG tablet Take 1 tablet by mouth Daily. 10/11/24  Yes Amina Oswald MD   apixaban (ELIQUIS) 5 MG tablet tablet Take 1 tablet by mouth Every 12 (Twelve) Hours. 10/11/24 10/11/25 Yes Amina Oswald MD    Entresto 49-51 MG tablet Take 1 tablet by mouth 2 (Two) Times a Day. 11/18/24  Yes Amina Oswald MD   amLODIPine (NORVASC) 5 MG tablet Take 1 tablet by mouth Daily. 3/26/24   Amina Oswald MD   carvedilol (COREG) 3.125 MG tablet Take 3.125 mg by mouth 2 (Two) Times a Day With Meals.    Amina Oswald MD   cephalexin (KEFLEX) 500 MG capsule Take 1 capsule by mouth 3 (Three) Times a Day. 6/27/24   Asha Field MD   clopidogrel (PLAVIX) 75 MG tablet Take 75 mg by mouth Daily.    Amina Oswald MD   Dermatological Products, Misc. (EpiCeram) lotion Apply 1 Application topically to the appropriate area as directed 2 (Two) Times a Day With Meals. 6/27/24 6/27/25  Asha Field MD   furosemide (LASIX) 40 MG tablet Take 40 mg by mouth.    Amina Oswald MD   gabapentin (NEURONTIN) 800 MG tablet Take 0.5 tablets by mouth 4 (Four) Times a Day. 8/2/22   Carrlilo Adkins MD   HYDROcodone-acetaminophen (NORCO)  MG per tablet Take 1 tablet by mouth Every 6 (Six) Hours As Needed. 6/1/22   Amina Oswald MD   multivitamin with minerals tablet tablet Take 1 tablet by mouth Daily.    Amina Oswald MD   pantoprazole (PROTONIX) 40 MG EC tablet Take 40 mg by mouth Daily.    Amina Oswald MD   potassium chloride (MICRO-K) 10 MEQ CR capsule Take 10 mEq by mouth Daily. 5/8/22   Amina Oswald MD   pravastatin (PRAVACHOL) 40 MG tablet Take 40 mg by mouth Daily. 5/8/22   Amina Oswald MD   zolpidem (AMBIEN) 10 MG tablet Take 1 tablet by mouth At Night As Needed. 6/30/22   Amina Oswald MD   lisinopril (PRINIVIL,ZESTRIL) 20 MG tablet Take 20 mg by mouth Daily. 2/10/22 2/22/25  Amina Oswald MD   spironolactone (ALDACTONE) 25 MG tablet Take 25 mg by mouth Daily.  2/22/25  Amina Oswald MD        Social History:   Social History     Tobacco Use    Smoking status: Never    Smokeless tobacco: Never   Vaping Use    Vaping  "status: Never Used   Substance Use Topics    Alcohol use: Not Currently    Drug use: Never         Review of Systems:  Review of Systems   Constitutional:  Negative for chills and fever.   HENT:  Negative for congestion, ear pain and sore throat.    Eyes:  Negative for pain.   Respiratory:  Positive for cough, shortness of breath and wheezing. Negative for chest tightness.    Cardiovascular:  Positive for leg swelling. Negative for chest pain.   Gastrointestinal:  Negative for abdominal pain, diarrhea, nausea and vomiting.   Genitourinary:  Negative for flank pain and hematuria.   Musculoskeletal:  Negative for joint swelling.   Skin:  Negative for pallor.   Neurological:  Positive for weakness. Negative for seizures and headaches.   All other systems reviewed and are negative.       Physical Exam:  /55 (BP Location: Right arm, Patient Position: Lying)   Pulse 73   Temp 97.7 °F (36.5 °C) (Oral)   Resp 18   Ht 177.8 cm (70\")   Wt (!) 141 kg (311 lb 8.2 oz)   SpO2 96%   BMI 44.70 kg/m²     Physical Exam  Vitals and nursing note reviewed.   Constitutional:       General: He is in acute distress.      Appearance: Normal appearance. He is ill-appearing. He is not toxic-appearing.   HENT:      Head: Normocephalic and atraumatic.      Mouth/Throat:      Mouth: Mucous membranes are moist.   Eyes:      General: No scleral icterus.  Cardiovascular:      Rate and Rhythm: Normal rate and regular rhythm.      Pulses: Normal pulses.      Heart sounds: Normal heart sounds.   Pulmonary:      Effort: Pulmonary effort is normal. No respiratory distress.      Breath sounds: Examination of the right-middle field reveals rhonchi and rales. Examination of the left-middle field reveals rhonchi and rales. Rhonchi and rales present.   Abdominal:      General: Abdomen is flat.      Palpations: Abdomen is soft.      Tenderness: There is no abdominal tenderness.   Musculoskeletal:         General: Normal range of motion.      " Cervical back: Normal range of motion and neck supple.      Right lower leg: No tenderness. Edema present.      Left lower leg: Edema present.   Skin:     General: Skin is warm and dry.      Capillary Refill: Capillary refill takes less than 2 seconds.   Neurological:      Mental Status: He is alert and oriented to person, place, and time. Mental status is at baseline.                    Medical Decision Making:      Comorbidities that affect care:    Atrial Fibrillation, Hypertension    External Notes reviewed:    Previous Clinic Note: Office visit for atrial fibrillation.      The following orders were placed and all results were independently analyzed by me:  Orders Placed This Encounter   Procedures    COVID PRE-OP / PRE-PROCEDURE SCREENING ORDER (NO ISOLATION) - Swab, Nasopharynx    COVID-19, FLU A/B, RSV PCR 1 HR TAT - Swab, Nasopharynx    Blood Culture - Blood,    Blood Culture - Blood,    Respiratory Culture - Sputum, Lung, Left Lower Lobe    XR Chest 1 View    CT Head Without Contrast    CT Chest With Contrast Diagnostic    [place] Draw    Comprehensive Metabolic Panel    BNP    High Sensitivity Troponin T    CBC Auto Differential    High Sensitivity Troponin T 1Hr    Lactic Acid, Plasma    CBC (No Diff)    Basic Metabolic Panel    Diet: Cardiac; Healthy Heart (2-3 Na+); Fluid Consistency: Thin (IDDSI 0)    Undress & Gown    Continuous Pulse Oximetry    Vital Signs    Vital Signs    Intake & Output    Weigh Patient    Oral Care    Saline Lock & Maintain IV Access    Code Status and Medical Interventions: CPR (Attempt to Resuscitate); Full Support    Hospitalist (on-call MD unless specified)    Oxygen Therapy- Nasal Cannula; Titrate 1-6 LPM Per SpO2; 90 - 95%    Incentive Spirometry    Oxygen Therapy- Nasal Cannula; Titrate 1-6 LPM Per SpO2; 90 - 95%    ECG 12 Lead ED Triage Standing Order; SOA    Insert Peripheral IV    Insert Peripheral IV    Inpatient Admission    CBC & Differential    Green Top (Gel)     Lavender Top    Gold Top - SST    Light Blue Top       Medications Given in the Emergency Department:  Medications   sodium chloride 0.9 % flush 10 mL (has no administration in time range)   cefTRIAXone (ROCEPHIN) in NS 1 gram/10ml IV PUSH syringe (has no administration in time range)   azithromycin (ZITHROMAX) tablet 250 mg (has no administration in time range)   saccharomyces boulardii (FLORASTOR) capsule 250 mg (250 mg Oral Given 2/22/25 2126)   sodium chloride 0.9 % flush 10 mL (has no administration in time range)   sodium chloride 0.9 % flush 10 mL (has no administration in time range)   sodium chloride 0.9 % infusion 40 mL (has no administration in time range)   Enoxaparin Sodium (LOVENOX) syringe 40 mg (has no administration in time range)   sodium chloride 0.9 % bolus 250 mL (has no administration in time range)   sennosides-docusate (PERICOLACE) 8.6-50 MG per tablet 2 tablet (has no administration in time range)     And   polyethylene glycol (MIRALAX) packet 17 g (has no administration in time range)     And   bisacodyl (DULCOLAX) EC tablet 5 mg (has no administration in time range)     And   bisacodyl (DULCOLAX) suppository 10 mg (has no administration in time range)   ondansetron (ZOFRAN) injection 4 mg (has no administration in time range)   guaiFENesin (MUCINEX) 12 hr tablet 600 mg (has no administration in time range)   guaiFENesin-dextromethorphan (ROBITUSSIN DM) 100-10 MG/5ML syrup 5 mL (has no administration in time range)   ipratropium-albuterol (DUO-NEB) nebulizer solution 3 mL (3 mL Nebulization Given 2/22/25 1912)   cefTRIAXone (ROCEPHIN) in NS 1 gram/10ml IV PUSH syringe (1,000 mg Intravenous Given 2/22/25 1925)   azithromycin (ZITHROMAX) 500 mg in sodium chloride 0.9 % 250 mL IVPB-VTB (0 mg Intravenous Stopped 2/22/25 2106)   furosemide (LASIX) injection 80 mg (80 mg Intravenous Given 2/22/25 1922)   iopamidol (ISOVUE-370) 76 % injection 100 mL (100 mL Intravenous Given 2/22/25 2120)         ED Course:    ED Course as of 02/22/25 2246   Sat Feb 22, 2025   19[birth year] proBNP(!): 1,209.0 [PP]      ED Course User Index  [PP] Indra Phillips DO       EKG: Sinus bradycardia with rate of 55 beats per right bundle branch block  Left axis deviation  No acute ischemia          Labs:    Lab Results (last 24 hours)       Procedure Component Value Units Date/Time    CBC & Differential [[id number]]  (Abnormal) Collected: 02/22/25 1500    Specimen: Blood from Arm, Left Updated: 02/22/25 1508    Narrative:      The following orders were created for panel order CBC & Differential.  Procedure                               Abnormality         Status                     ---------                               -----------         ------                     CBC Auto Differential[[id number]]        Abnormal            Final result                 Please view results for these tests on the individual orders.    Comprehensive Metabolic Panel [[id number]]  (Abnormal) Collected: 02/22/25 1500    Specimen: Blood from Arm, Left Updated: 02/22/25 1543     Glucose 143 mg/dL      BUN 17 mg/dL      Creatinine 1.24 mg/dL      Sodium 134 mmol/L      Potassium 3.5 mmol/L      Comment: Slight hemolysis detected by analyzer. Result may be falsely elevated.        Chloride 92 mmol/L      CO2 34.4 mmol/L      Calcium 8.9 mg/dL      Total Protein 6.8 g/dL      Albumin 3.3 g/dL      ALT (SGPT) 8 U/L      AST (SGOT) 31 U/L      Comment: Slight hemolysis detected by analyzer. Result may be falsely elevated.        Alkaline Phosphatase 145 U/L      Total Bilirubin 0.9 mg/dL      Globulin 3.5 gm/dL      A/G Ratio 0.9 g/dL      BUN/Creatinine Ratio 13.7     Anion Gap 7.6 mmol/L      eGFR 62.2 mL/min/1.73     Narrative:      GFR Categories in Chronic Kidney Disease (CKD)      GFR Category          GFR (mL/min/1.73)    Interpretation  G1                     90 or greater         Normal or high (1)  G2                      60-89                Mild  decrease (1)  G3a                   45-59                Mild to moderate decrease  G3b                   30-44                Moderate to severe decrease  G4                    15-29                Severe decrease  G5                    14 or less           Kidney failure          (1)In the absence of evidence of kidney disease, neither GFR category G1 or G2 fulfill the criteria for CKD.    eGFR calculation 2021 CKD-EPI creatinine equation, which does not include race as a factor    BNP [[id number]]  (Abnormal) Collected: 02/22/25 1500    Specimen: Blood from Arm, Left Updated: 02/22/25 1534     proBNP 1,209.0 pg/mL     Narrative:      This assay is used as an aid in the diagnosis of individuals suspected of having heart failure. It can be used as an aid in the diagnosis of acute decompensated heart failure (ADHF) in patients presenting with signs and symptoms of ADHF to the emergency department (ED). In addition, NT-proBNP of <300 pg/mL indicates ADHF is not likely.    Age Range Result Interpretation  NT-proBNP Concentration (pg/mL:      <50             Positive            >450                   Gray                 300-450                    Negative             <300    50-75           Positive            >900                  Gray                300-900                  Negative            <300      >75             Positive            >1800                  Gray                300-1800                  Negative            <300    High Sensitivity Troponin T [[id number]]  (Abnormal) Collected: 02/22/25 1500    Specimen: Blood from Arm, Left Updated: 02/22/25 1539     HS Troponin T 26 ng/L     Narrative:      High Sensitive Troponin T Reference Range:  <14.0 ng/L- Negative Female for AMI  <22.0 ng/L- Negative Male for AMI  >=14 - Abnormal Female indicating possible myocardial injury.  >=22 - Abnormal Male indicating possible myocardial injury.   Clinicians would have to utilize clinical acumen, EKG, Troponin, and  serial changes to determine if it is an Acute Myocardial Infarction or myocardial injury due to an underlying chronic condition.         CBC Auto Differential [[id number]]  (Abnormal) Collected: 02/22/25 1500    Specimen: Blood from Arm, Left Updated: 02/22/25 1508     WBC 14.00 10*3/mm3      RBC 4.24 10*6/mm3      Hemoglobin 13.1 g/dL      Hematocrit 41.2 %      MCV 97.2 fL      MCH 30.9 pg      MCHC 31.8 g/dL      RDW 13.2 %      RDW-SD 47.1 fl      MPV 9.5 fL      Platelets 232 10*3/mm3      Neutrophil % 86.7 %      Lymphocyte % 6.4 %      Monocyte % 4.6 %      Eosinophil % 1.1 %      Basophil % 0.8 %      Immature Grans % 0.4 %      Neutrophils, Absolute 12.14 10*3/mm3      Lymphocytes, Absolute 0.89 10*3/mm3      Monocytes, Absolute 0.65 10*3/mm3      Eosinophils, Absolute 0.16 10*3/mm3      Basophils, Absolute 0.11 10*3/mm3      Immature Grans, Absolute 0.05 10*3/mm3      nRBC 0.0 /100 WBC     COVID PRE-OP / PRE-PROCEDURE SCREENING ORDER (NO ISOLATION) - Swab, Nasopharynx [[id number]]  (Normal) Collected: 02/22/25 1513    Specimen: Swab from Nasopharynx Updated: 02/22/25 1600    Narrative:      The following orders were created for panel order COVID PRE-OP / PRE-PROCEDURE SCREENING ORDER (NO ISOLATION) - Swab, Nasopharynx.  Procedure                               Abnormality         Status                     ---------                               -----------         ------                     COVID-19, FLU A/B, RSV P...[[id number]]  Normal              Final result                 Please view results for these tests on the individual orders.    COVID-19, FLU A/B, RSV PCR 1 HR TAT - Swab, Nasopharynx [[id number]]  (Normal) Collected: 02/22/25 1513    Specimen: Swab from Nasopharynx Updated: 02/22/25 1600     COVID19 Not Detected     Influenza A PCR Not Detected     Influenza B PCR Not Detected     RSV, PCR Not Detected    Narrative:      Fact sheet for providers: https://www.fda.gov/media/043891/download    Fact  sheet for patients: https://www.fda.gov/media/606963/download    Test performed by PCR.    High Sensitivity Troponin T 1Hr [[id number]]  (Abnormal) Collected: 02/22/25 1606    Specimen: Blood Updated: 02/22/25 1631     HS Troponin T 24 ng/L      Troponin T Numeric Delta -2 ng/L      Troponin T % Delta -8    Narrative:      High Sensitive Troponin T Reference Range:  <14.0 ng/L- Negative Female for AMI  <22.0 ng/L- Negative Male for AMI  >=14 - Abnormal Female indicating possible myocardial injury.  >=22 - Abnormal Male indicating possible myocardial injury.   Clinicians would have to utilize clinical acumen, EKG, Troponin, and serial changes to determine if it is an Acute Myocardial Infarction or myocardial injury due to an underlying chronic condition.         Blood Culture - Blood, Hand, Left [[id number]] Collected: 02/22/25 1910    Specimen: Blood from Hand, Left Updated: 02/22/25 1919    Blood Culture - Blood, Arm, Right [[id number]] Collected: 02/22/25 1915    Specimen: Blood from Arm, Right Updated: 02/22/25 1919    Lactic Acid, Plasma [[id number]]  (Normal) Collected: 02/22/25 1915    Specimen: Blood from Arm, Right Updated: 02/22/25 1935     Lactate 1.0 mmol/L     Respiratory Culture - Sputum, Lung, Left Lower Lobe [[id number]] Collected: 02/22/25 2041    Specimen: Sputum from Lung, Left Lower Lobe Updated: 02/22/25 2047             Imaging:    CT Chest With Contrast Diagnostic    Addendum Date: 2/22/2025    ADDENDUM:  Postoperative changes involve the cervical spine and are partially imaged on the study. There are degenerative changes throughout the imaged spine.   Portions of this note were completed with a voice recognition program.  2/22/2025 9:42 PM by Sampson Giordano MD on Workstation: [email or web address]      Result Date: 2/22/2025  CT CHEST W CONTRAST DIAGNOSTIC-  Date of exam: 2/22/2025, 9:20 P.M.  Indications: Acute dyspnea; w19.xxxa-unspecified fall, initial encounter; r53.1-weakness; j18.9-pneumonia, unspecified  "organism; i50.9-heart failure, unspecified; j96.01-acute respiratory failure with hypoxia.  Comparisons: 2/22/2025; 12/4/2024.  TECHNIQUE: Axial CT images were obtained of the chest after the uneventful intravenous administration of 100 mL (or less) of Isovue-370 nonionic contrast agent. Reconstructed 2D coronal and sagittal images were also obtained. Automated exposure control and iterative construction methods were used. No 3D \"radial range\" reformation is provided. The study was NOT ordered as a CT pulmonary angiogram. Please see the EMR (i.e., [facility]) for the documented dose of intravenous contrast agent as well as the radiation dose. Despite best efforts, poor image quality limits interpretation of the study. For instance, the contrast bolus in the pulmonary arteries is limited.  FINDINGS: LUNGS: Patchy groundglass infiltrates are seen on the right, predominantly in the right upper lobe but also involving the right lower lobe, especially the superior segment. These findings suggest acute to subacute infectious/inflammatory bronchopneumonia. Aspiration pneumonia cannot be excluded entirely but is thought to be less likely. They probably involve the right middle lobe to the least extent (if at all). Atelectasis is suggested on the left, especially within the left lower lobe and perihilar regions. Acute infectious left-sided infiltrate(s) cannot be excluded but is (are) thought to be less likely consider close interval clinical, lab, and imaging follow-up to ensure a benign progression. The airspace/interstitial opacities and peribronchial opacities may obscure pulmonary nodules. VASCULATURE: Grossly, no proximal (or central) pulmonary embolism. The main pulmonary artery is dilated, 4.6 cm, suggesting chronic pulmonary arterial hypertension or pulmonary valvular disease. There are coronary artery calcifications. Atherosclerotic changes are seen elsewhere. JULIANNE/MEDIASTINUM: Small-to-moderate-sized probably reactive " noncalcified nonsuppurative bilateral hilar/mediastinal lymph nodes are suggested. CARDIAC: Borderline cardiac enlargement is suggested. There is a trace amount of pericardial effusion. AORTA: The maximum diameter of the ascending aorta is 5.2 cm. Consider close interval clinical and imaging follow-up of this finding. Consider Cardiovascular Thoracic Surgery consultation for further assessment, management, and treatment options. No thoracic aortic dissection is appreciated. PLEURA: Minimal, if any, pleural effusion is seen. No pneumothorax. CHEST WALL: No mass or axillary adenopathy. No subcutaneous emphysema. No focal fluid collection. LIMITED ABDOMEN: No acute findings are seen in the partially imaged upper abdomen. There may be diffuse hepatic steatosis with hepatomegaly. Some degree of pancreatic lipomatosis is suggested. BONES: No acute fracture. No aggressive osseous lesion. There are several old bilateral rib fractures, especially seen anteriorly. OTHER: The central tracheobronchial tree is well aerated without filling defect. There is some degree of peribronchial thickening, especially on the right. Chronic calcified granulomatous disease involves the chest and abdomen.       1.  Grossly, no proximal (or central) no pulmonary embolism. 2.  There is fusiform dilatation of the ascending aorta, which measures about 5.2 cm greatest diameter. No thoracic aortic dissection is seen. 3.  Right-sided bronchopneumonia is suggested. 4.  Please see above comments for further detail.   Portions of this note were completed with a voice recognition program.  2/22/2025 9:39 PM by Sampson Giordano MD on Workstation: [employer]      CT Head Without Contrast    Result Date: 2/22/2025  CT HEAD WO CONTRAST Date of Exam: 2/22/2025 3:40 PM EST Indication: fall, head injury. Comparison: 8/1/2022 Technique: Axial CT images were obtained of the head without contrast administration.  Reconstructed coronal and sagittal images were also  obtained. Automated exposure control and iterative construction methods were used. Findings: No intracranial hemorrhage. Gray-white matter differentiation is maintained without evidence of an acute infarction. Multiple foci of decreased attenuation are present within the subcortical, deep cerebral, and periventricular white matter consistent with chronic small vessel/microangiopathic ischemic changes. No extra-axial mass or collection. The ventricles and sulci are prominent commensurate with involutional changes. The posterior fossa appears grossly normal. Sellar and suprasellar structures are normal. Orbital and periorbital soft tissues are normal. The paranasal sinuses, ethmoid air cells, and mastoid air cells are aerated. The bony calvarium is intact.     Impression: No acute intracranial pathology. Electronically Signed: Sampson Brooks MD  2/22/2025 4:01 PM EST  Workstation ID: [id number]    XR Chest 1 View    Result Date: 2/22/2025  XR CHEST 1 VW Date of Exam: 2/22/2025 3:05 PM EST Indication: SOA Triage Protocol Comparison: CXR 12/4/2024 Findings: The heart is normal in size. The lungs are well expanded. Hazy increased opacity over the right hemithorax may represent airspace disease. No dense consolidation or mass is seen. Metal plate and screws in the lower cervical spine are consistent with anterior fusion.     Impression: Hazy increased density over the right hemithorax may represent mild airspace disease. No dense consolidation or mass is seen. Electronically Signed: Alireza Flor MD  2/22/2025 3:13 PM EST  Workstation ID: [id number]       Differential Diagnosis and Discussion:    Cough: Differential diagnosis includes but is not limited to pneumonia, acute bronchitis, upper respiratory infection, ACE inhibitor use, allergic reaction, epiglottitis, seasonal allergies, chemical irritants, exercise-induced asthma, viral syndrome.  Dyspnea: Differential diagnosis includes but is not limited to metabolic acidosis,  neurological disorders, psychogenic, asthma, pneumothorax, upper airway obstruction, COPD, pneumonia, noncardiogenic pulmonary edema, interstitial lung disease, anemia, congestive heart failure, and pulmonary embolism    PROCEDURES:    Labs were collected in the emergency department and all labs were reviewed and interpreted by me.  X-ray were performed in the emergency department and all X-ray impressions were independently interpreted by me.  An EKG was performed and the EKG was interpreted by me.    ECG 12 Lead ED Triage Standing Order; SOA   Final Result   HEART RATE=55  bpm   RR Gilmlsbg=9135  ms   ID Pytgfmqp=932  ms   P Horizontal Axis=-5  deg   P Front Axis=32  deg   QRSD Nnvsloot=547  ms   QT Snxdbfxb=387  ms   HYsG=721  ms   QRS Axis=-46  deg   T Wave Axis=105  deg   - ABNORMAL ECG -   Sinus rhythm   Incomplete RBBB and LAFB   Borderline  T wave abnormalities   When compared with ECG of 04-Dec-2022 11:55:28,   No significant change   Electronically Signed By: Dewayne Cha ([facility]) 2025-02-22 18:48:55   Date and Time of Study:2025-02-22 14:52:20          Procedures    MDM     Amount and/or Complexity of Data Reviewed  Clinical lab tests: reviewed  Tests in the radiology section of CPT®: reviewed  Tests in the medicine section of CPT®: reviewed  Decide to obtain previous medical records or to obtain history from someone other than the patient: yes             Total Critical Care time of 45 minutes. Total critical care time documented does not include time spent on separately billed procedures for services of nurses or physician assistants. I personally saw and examined the patient. I have reviewed all diagnostic interpretations and treatment plans as written. I was present for the key portions of any procedures performed and the inclusive time noted in any critical care statement. Critical care time includes patient management by me, time spent at the patients bedside,  time to review lab and imaging results,  discussing patient care, documentation in the medical record, and time spent with family or caregiver.          Patient Care Considerations:    SEPSIS was considered but is NOT present in the emergency department as SIRS criteria is not present.      Consultants/Shared Management Plan:    Hospitalist: I have discussed the case with hospitalist who agrees to accept the patient for admission.    Social Determinants of Health:    Patient is unable to carry out activities of daily life. Escalation of care is necessary.       Disposition and Care Coordination:    Admit:   Through independent evaluation of the patient's history, physical, and imperical data, the patient meets criteria for inpatient admission to the hospital.        Final diagnoses:   Fall, initial encounter   Weakness   Pneumonia due to infectious organism, unspecified laterality, unspecified part of lung   Congestive heart failure, unspecified HF chronicity, unspecified heart failure type   Acute respiratory failure with hypoxia        ED Disposition       ED Disposition   Decision to Admit    Condition   --    Comment   Level of Care: Remote Telemetry [26]   Diagnosis: Acute on chronic respiratory failure with hypoxia [[id number]]   Admitting Physician: ALEX GARCES [[id number]]   Certification: I Certify That Inpatient Hospital Services Are Medically Necessary For Greater Than 2 Midnights                 This medical record created using voice recognition software.             Indra Phillips,   02/22/25 4458

## 2025-02-23 ENCOUNTER — APPOINTMENT (OUTPATIENT)
Dept: CARDIOLOGY | Facility: HOSPITAL | Age: 70
DRG: 193 | End: 2025-02-23
Payer: MEDICARE

## 2025-02-23 PROBLEM — J18.9 PNEUMONIA DUE TO INFECTIOUS ORGANISM: Status: ACTIVE | Noted: 2025-02-23
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
## 2025-02-23 PROBLEM — I50.9 CONGESTIVE HEART FAILURE: Status: ACTIVE | Noted: 2025-02-23

## 2025-02-23 PROBLEM — I10 HTN (HYPERTENSION): Status: ACTIVE | Noted: 2025-02-23

## 2025-02-23 PROBLEM — R53.1 WEAKNESS: Status: ACTIVE | Noted: 2025-02-23

## 2025-02-23 LAB
ANION GAP SERPL CALCULATED.3IONS-SCNC: 10.4 MMOL/L (ref 5–15)
AV MEAN PRESS GRAD SYS DOP V1V2: 5 MMHG
BH CV ECHO MEAS - AO ROOT DIAM: 4.3 CM
BH CV ECHO MEAS - AO V2 VTI: 36.3 CM
BH CV ECHO MEAS - AVA(I,D): 2.8 CM2
BH CV ECHO MEAS - EDV(CUBED): 177.5 ML
BH CV ECHO MEAS - EDV(MOD-SP2): 99.3 ML
BH CV ECHO MEAS - EDV(MOD-SP4): 115 ML
BH CV ECHO MEAS - EF(MOD-SP2): 64.1 %
BH CV ECHO MEAS - EF(MOD-SP4): 63 %
BH CV ECHO MEAS - ESV(CUBED): 49.4 ML
BH CV ECHO MEAS - ESV(MOD-SP2): 35.6 ML
BH CV ECHO MEAS - ESV(MOD-SP4): 42.5 ML
BH CV ECHO MEAS - FS: 34.7 %
BH CV ECHO MEAS - IVS/LVPW: 0.83 CM
BH CV ECHO MEAS - IVSD: 1 CM
BH CV ECHO MEAS - LA DIMENSION: 3.5 CM
BH CV ECHO MEAS - LAT PEAK E' VEL: 8.7 CM/SEC
BH CV ECHO MEAS - LV DIASTOLIC VOL/BSA (35-75): 45.7 CM2
BH CV ECHO MEAS - LV MASS(C)D: 252.2 GRAMS
BH CV ECHO MEAS - LV MAX PG: 3.3 MMHG
BH CV ECHO MEAS - LV MEAN PG: 2 MMHG
BH CV ECHO MEAS - LV SYSTOLIC VOL/BSA (12-30): 16.9 CM2
BH CV ECHO MEAS - LV V1 MAX: 90.8 CM/SEC
BH CV ECHO MEAS - LV V1 VTI: 24.7 CM
BH CV ECHO MEAS - LVIDD: 5.6 CM
BH CV ECHO MEAS - LVIDS: 3.7 CM
BH CV ECHO MEAS - LVOT AREA: 4.2 CM2
BH CV ECHO MEAS - LVOT DIAM: 2.3 CM
BH CV ECHO MEAS - LVPWD: 1.21 CM
BH CV ECHO MEAS - MED PEAK E' VEL: 6.1 CM/SEC
BH CV ECHO MEAS - MV A MAX VEL: 106 CM/SEC
BH CV ECHO MEAS - MV DEC SLOPE: 730 CM/SEC2
BH CV ECHO MEAS - MV DEC TIME: 0.18 SEC
BH CV ECHO MEAS - MV E MAX VEL: 133 CM/SEC
BH CV ECHO MEAS - MV E/A: 1.25
BH CV ECHO MEAS - MV MEAN PG: 2 MMHG
BH CV ECHO MEAS - MV V2 VTI: 46.4 CM
BH CV ECHO MEAS - MVA(VTI): 2.21 CM2
BH CV ECHO MEAS - RVDD: 3.3 CM
BH CV ECHO MEAS - SV(LVOT): 102.6 ML
BH CV ECHO MEAS - SV(MOD-SP2): 63.7 ML
BH CV ECHO MEAS - SV(MOD-SP4): 72.5 ML
BH CV ECHO MEAS - SVI(LVOT): 40.8 ML/M2
BH CV ECHO MEAS - SVI(MOD-SP2): 25.3 ML/M2
BH CV ECHO MEAS - SVI(MOD-SP4): 28.8 ML/M2
BH CV ECHO MEAS - TAPSE (>1.6): 2.5 CM
BH CV ECHO MEAS - TR MAX PG: 38.9 MMHG
BH CV ECHO MEAS - TR MAX VEL: 312 CM/SEC
BH CV ECHO MEASUREMENTS AVERAGE E/E' RATIO: 17.97
BUN SERPL-MCNC: 15 MG/DL (ref 8–23)
BUN/CREAT SERPL: 16.7 (ref 7–25)
CALCIUM SPEC-SCNC: 8.4 MG/DL (ref 8.6–10.5)
CHLORIDE SERPL-SCNC: 97 MMOL/L (ref 98–107)
CO2 SERPL-SCNC: 33.6 MMOL/L (ref 22–29)
CREAT SERPL-MCNC: 0.9 MG/DL (ref 0.76–1.27)
DEPRECATED RDW RBC AUTO: 46.8 FL (ref 37–54)
EGFRCR SERPLBLD CKD-EPI 2021: 91.3 ML/MIN/1.73
ERYTHROCYTE [DISTWIDTH] IN BLOOD BY AUTOMATED COUNT: 13 % (ref 12.3–15.4)
GLUCOSE SERPL-MCNC: 155 MG/DL (ref 65–99)
HCT VFR BLD AUTO: 42.1 % (ref 37.5–51)
HGB BLD-MCNC: 13.2 G/DL (ref 13–17.7)
LEFT ATRIUM VOLUME INDEX: 14.2 ML/M2
LV EF BIPLANE MOD: 61.7 %
MAGNESIUM SERPL-MCNC: 2.2 MG/DL (ref 1.6–2.4)
MCH RBC QN AUTO: 30.6 PG (ref 26.6–33)
MCHC RBC AUTO-ENTMCNC: 31.4 G/DL (ref 31.5–35.7)
MCV RBC AUTO: 97.7 FL (ref 79–97)
PLATELET # BLD AUTO: 268 10*3/MM3 (ref 140–450)
PMV BLD AUTO: 9.7 FL (ref 6–12)
POTASSIUM SERPL-SCNC: 3.2 MMOL/L (ref 3.5–5.2)
RBC # BLD AUTO: 4.31 10*6/MM3 (ref 4.14–5.8)
SODIUM SERPL-SCNC: 141 MMOL/L (ref 136–145)
WBC NRBC COR # BLD AUTO: 10.55 10*3/MM3 (ref 3.4–10.8)

## 2025-02-23 PROCEDURE — 94660 CPAP INITIATION&MGMT: CPT

## 2025-02-23 PROCEDURE — 93306 TTE W/DOPPLER COMPLETE: CPT

## 2025-02-23 PROCEDURE — 94799 UNLISTED PULMONARY SVC/PX: CPT

## 2025-02-23 PROCEDURE — 99232 SBSQ HOSP IP/OBS MODERATE 35: CPT | Performed by: STUDENT IN AN ORGANIZED HEALTH CARE EDUCATION/TRAINING PROGRAM

## 2025-02-23 PROCEDURE — 94761 N-INVAS EAR/PLS OXIMETRY MLT: CPT

## 2025-02-23 PROCEDURE — 93306 TTE W/DOPPLER COMPLETE: CPT | Performed by: INTERNAL MEDICINE

## 2025-02-23 PROCEDURE — 83735 ASSAY OF MAGNESIUM: CPT | Performed by: STUDENT IN AN ORGANIZED HEALTH CARE EDUCATION/TRAINING PROGRAM

## 2025-02-23 PROCEDURE — 85027 COMPLETE CBC AUTOMATED: CPT | Performed by: FAMILY MEDICINE

## 2025-02-23 PROCEDURE — 25010000002 ONDANSETRON PER 1 MG: Performed by: FAMILY MEDICINE

## 2025-02-23 PROCEDURE — 80048 BASIC METABOLIC PNL TOTAL CA: CPT | Performed by: FAMILY MEDICINE

## 2025-02-23 PROCEDURE — 25010000002 SULFUR HEXAFLUORIDE MICROSPH 60.7-25 MG RECONSTITUTED SUSPENSION: Performed by: STUDENT IN AN ORGANIZED HEALTH CARE EDUCATION/TRAINING PROGRAM

## 2025-02-23 PROCEDURE — 25010000002 CEFTRIAXONE PER 250 MG: Performed by: FAMILY MEDICINE

## 2025-02-23 RX ORDER — POTASSIUM CHLORIDE 750 MG/1
40 CAPSULE, EXTENDED RELEASE ORAL ONCE
Status: COMPLETED | OUTPATIENT
Start: 2025-02-23 | End: 2025-02-23

## 2025-02-23 RX ORDER — BUDESONIDE 0.5 MG/2ML
0.5 INHALANT ORAL
Status: DISCONTINUED | OUTPATIENT
Start: 2025-02-23 | End: 2025-03-07 | Stop reason: HOSPADM

## 2025-02-23 RX ORDER — ARFORMOTEROL TARTRATE 15 UG/2ML
15 SOLUTION RESPIRATORY (INHALATION)
Status: DISCONTINUED | OUTPATIENT
Start: 2025-02-23 | End: 2025-03-07 | Stop reason: HOSPADM

## 2025-02-23 RX ADMIN — Medication 10 ML: at 21:55

## 2025-02-23 RX ADMIN — GABAPENTIN 800 MG: 400 CAPSULE ORAL at 21:54

## 2025-02-23 RX ADMIN — ARFORMOTEROL TARTRATE 15 MCG: 15 SOLUTION RESPIRATORY (INHALATION) at 20:40

## 2025-02-23 RX ADMIN — SODIUM CHLORIDE 1000 MG: 9 INJECTION INTRAMUSCULAR; INTRAVENOUS; SUBCUTANEOUS at 18:05

## 2025-02-23 RX ADMIN — BUDESONIDE 0.5 MG: 0.5 INHALANT RESPIRATORY (INHALATION) at 20:40

## 2025-02-23 RX ADMIN — CARVEDILOL 3.12 MG: 3.12 TABLET, FILM COATED ORAL at 09:43

## 2025-02-23 RX ADMIN — POTASSIUM CHLORIDE 40 MEQ: 750 CAPSULE, EXTENDED RELEASE ORAL at 09:44

## 2025-02-23 RX ADMIN — SACUBITRIL AND VALSARTAN 1 TABLET: 49; 51 TABLET, FILM COATED ORAL at 10:35

## 2025-02-23 RX ADMIN — IPRATROPIUM BROMIDE AND ALBUTEROL SULFATE 3 ML: .5; 3 SOLUTION RESPIRATORY (INHALATION) at 14:54

## 2025-02-23 RX ADMIN — Medication 250 MG: at 21:55

## 2025-02-23 RX ADMIN — APIXABAN 5 MG: 5 TABLET, FILM COATED ORAL at 10:35

## 2025-02-23 RX ADMIN — SENNOSIDES AND DOCUSATE SODIUM 2 TABLET: 50; 8.6 TABLET ORAL at 21:55

## 2025-02-23 RX ADMIN — ONDANSETRON 4 MG: 2 INJECTION INTRAMUSCULAR; INTRAVENOUS at 15:29

## 2025-02-23 RX ADMIN — GUAIFENESIN 600 MG: 600 TABLET ORAL at 21:55

## 2025-02-23 RX ADMIN — GABAPENTIN 800 MG: 400 CAPSULE ORAL at 14:22

## 2025-02-23 RX ADMIN — APIXABAN 5 MG: 5 TABLET, FILM COATED ORAL at 21:55

## 2025-02-23 RX ADMIN — FUROSEMIDE 40 MG: 40 TABLET ORAL at 21:55

## 2025-02-23 RX ADMIN — CLOPIDOGREL BISULFATE 75 MG: 75 TABLET ORAL at 09:43

## 2025-02-23 RX ADMIN — AZITHROMYCIN DIHYDRATE 250 MG: 250 TABLET ORAL at 17:47

## 2025-02-23 RX ADMIN — FUROSEMIDE 40 MG: 40 TABLET ORAL at 10:35

## 2025-02-23 RX ADMIN — GUAIFENESIN 600 MG: 600 TABLET ORAL at 09:43

## 2025-02-23 RX ADMIN — AMIODARONE HYDROCHLORIDE 200 MG: 200 TABLET ORAL at 09:44

## 2025-02-23 RX ADMIN — Medication 10 ML: at 09:44

## 2025-02-23 RX ADMIN — SULFUR HEXAFLUORIDE 3 ML: KIT at 12:17

## 2025-02-23 RX ADMIN — SACUBITRIL AND VALSARTAN 1 TABLET: 49; 51 TABLET, FILM COATED ORAL at 21:55

## 2025-02-23 RX ADMIN — PANTOPRAZOLE SODIUM 40 MG: 40 TABLET, DELAYED RELEASE ORAL at 09:44

## 2025-02-23 RX ADMIN — GABAPENTIN 800 MG: 400 CAPSULE ORAL at 06:09

## 2025-02-23 RX ADMIN — Medication 250 MG: at 09:43

## 2025-02-23 NOTE — H&P
[facility]   HISTORY AND PHYSICAL    Patient Name: Sarah Osorio  : 1955  MRN: [id number]  Primary Care Physician:  Silvia Faustin APRN  Date of admission: 2025    Subjective   Subjective     Chief Complaint: Generalized weakness, fall    HPI:    Sarah Osorio is a 70 y.o. male with past medical history of hypertension, hyperlipidemia, diastolic CHF, A-fib on Eliquis, chronic back pain following injury, morbid obesity and GERD presented to the ED for evaluation of generalized weakness and fall.  Patient said that he has been feeling rather weak with dyspnea on exertion the last few days and around 4 AM this morning patient went to the bathroom and fell to the floor and was so weak that he could not get back up. He crawled back to his bedroom and stayed on the floor for hours until wife decided to call EMS to bring him to the ED for further evaluation.  In the ED patient was hypoxic on arrival requiring oxygen supplementation via nasal cannula with remaining vitals being within normal limits.  Labs showed leukocytosis, electrolyte imbalances, elevated proBNP and elevated but flat troponin.  Remaining labs being relatively unremarkable including negative COVID flu RSV.  CT of the chest is negative for any PE but did show some right-sided bronchopneumonia.  When seen patient was complaining of severe lower back pain but when asked he denied any recent fevers, headaches, focal weakness, chest pain, palpitation, abdominal pain, nausea, vomiting, diarrhea, constipation, dysuria, hematuria, hematochezia, melena, or anxiety.  Patient admitted for further evaluation and treatment.    Review of Systems   All systems were reviewed and negative except for: As per HPI    Personal History     Past Medical History:   Diagnosis Date    Abdominal aneurysm     Atrial fib/flutter, transient     CHF (congestive heart failure)     Hyperlipidemia     Hypertension     Injury of back        Past Surgical History:    Procedure Laterality Date    APPENDECTOMY      CARDIAC CATHETERIZATION      HERNIA REPAIR         Family History: family history is not on file. Otherwise pertinent FHx was reviewed and not pertinent to current issue.    Social History:  reports that he has never smoked. He has never used smokeless tobacco. He reports that he does not currently use alcohol. He reports that he does not use drugs.    Home Medications:  EpiCeram, HYDROcodone-acetaminophen, amLODIPine, amiodarone, apixaban, carvedilol, cephalexin, clopidogrel, furosemide, gabapentin, multivitamin with minerals, pantoprazole, potassium chloride, pravastatin, sacubitril-valsartan, and zolpidem      Allergies:  No Known Allergies    Objective   Objective     Vitals:   Temp:  [98.5 °F (36.9 °C)] 98.5 °F (36.9 °C)  Heart Rate:  [59-75] 71  Resp:  [18-20] 20  BP: (122-148)/(49-80) 143/71  Flow (L/min) (Oxygen Therapy):  [2] 2  Physical Exam    Constitutional: Awake, alert   Eyes: PERRLA, sclerae anicteric, no conjunctival injection   HENT: NCAT, mucous membranes moist   Neck: Supple, no thyromegaly, no lymphadenopathy, trachea midline   Respiratory: Decreased breath sounds on the right, nonlabored respirations    Cardiovascular: RRR, no murmurs, rubs, or gallops, palpable pedal pulses bilaterally   Gastrointestinal: Positive bowel sounds, soft, nontender, nondistended   Musculoskeletal: Bilateral lower extremity edema, no clubbing or cyanosis to extremities   Psychiatric: Appropriate affect, cooperative   Neurologic: Oriented x 3, strength symmetric in all extremities, Cranial Nerves grossly intact to confrontation, speech clear   Skin: No rashes     Result Review    Result Review:  I have personally reviewed the results from the time of this admission to 2/22/2025 19:48 EST and agree with these findings:  [x]  Laboratory list / accordion  []  Microbiology  [x]  Radiology  [x]  EKG/Telemetry   []  Cardiology/Vascular   []  Pathology  []  Old records  []   Other:  Most notable findings include: Labs showed leukocytosis, electrolyte imbalances, elevated proBNP and elevated but flat troponin.  Remaining labs being relatively unremarkable including negative COVID flu RSV.  CT of the chest is negative for any PE but did show some right-sided bronchopneumonia      Assessment & Plan   Assessment / Plan     Brief Patient Summary:  Sarah Osorio is a 70 y.o. male with past medical history of hypertension, hyperlipidemia, diastolic CHF, A-fib on Eliquis, chronic back pain following injury, morbid obesity and GERD presented to the ED for evaluation of generalized weakness and fall.     Active Hospital Problems:  Active Hospital Problems    Diagnosis     **Acute on chronic respiratory failure with hypoxia     Weakness     Fall     Pneumonia due to infectious organism     Congestive heart failure     HTN (hypertension)      Plan:     Acute on chronic respiratory failure with hypoxia  -Admit to telemetry  -Secondary to CHF exacerbation with superimposed pneumonia  -Imaging reviewed.  Negative for PE  -Supplemental oxygen as needed, wean down as tolerated  -Duo nebs  as needed  -Empiric antibiotics with Rocephin and azithromycin  -Sputum, Blood culture  -Mucinex, Tessalon Perles  -Incentive spirometry  -Consult pulmonology if warranted  -Resume CHF medications  -1.5 L fluid restriction  -Update echo if needed  -Supportive care    Generalized weakness/fall  -Patient down for hours at home  -Made worse by above  -Fall precautions  -PT OT  -Placement if needed    HTN  -Currently well controlled  -PRN BP meds  -Resume home meds when available  -Titrate if needed    CHF  HLD  A-fib on Eliquis  Chronic Pain    GI ppx  DVT ppx      VTE Prophylaxis:  Pharmacologic VTE prophylaxis orders are present.        CODE STATUS: Full code     Admission Status:  I believe this patient meets inpatient status.      Electronically signed by Brayden Adamson MD, 02/22/25, 7:48 PM EST.

## 2025-02-23 NOTE — PROGRESS NOTES
RT EQUIPMENT DEVICE RELATED - SKIN ASSESSMENT    RT Medical Equipment/Device:     NIV Mask:  Under-the-nose   size:  l    Skin Assessment:      Nose:  Intact    Device Skin Pressure Protection:  Skin-to skin areas padded    Nurse Notification:  Roseanna Albarran, RRT

## 2025-02-23 NOTE — PLAN OF CARE
Goal Outcome Evaluation:   Pt did wear bipap all night. Is on 4lnc during the day

## 2025-02-23 NOTE — PROGRESS NOTES
[facility]   Hospitalist Progress Note  Date: 2025  Patient Name: Sarah Osorio  : 1955  MRN: [id number]  Date of admission: 2025  Room/Bed: [facility]      Subjective   Subjective     Chief Complaint: Generalized weakness, fall     Summary:Sarah Osorio is a 70 y.o. male with past medical history of hypertension, hyperlipidemia, diastolic CHF, A-fib on Eliquis, chronic back pain following injury, morbid obesity and GERD presented to the ED for evaluation of generalized weakness and fall.  Patient said that he has been feeling rather weak with dyspnea on exertion the last few days and around 4 AM this morning patient went to the bathroom and fell to the floor and was so weak that he could not get back up. He crawled back to his bedroom and stayed on the floor for hours until wife decided to call EMS to bring him to the ED for further evaluation.  In the ED patient was hypoxic on arrival requiring oxygen supplementation via nasal cannula with remaining vitals being within normal limits.  Labs showed leukocytosis, electrolyte imbalances, elevated proBNP and elevated but flat troponin.  Remaining labs being relatively unremarkable including negative COVID flu RSV.  CT of the chest is negative for any PE but did show some right-sided bronchopneumonia.  When seen patient was complaining of severe lower back pain but when asked he denied any recent fevers, headaches, focal weakness, chest pain, palpitation, abdominal pain, nausea, vomiting, diarrhea, constipation, dysuria, hematuria, hematochezia, melena, or anxiety.  Patient admitted for further evaluation and treatment.     Interval Followup: No acute events overnight. On 4L/min of O2.  Stated his breathing is slightly better today.  No other complaints.  Hypokalemia, repleted.    Review of Systems    All systems reviewed and negative except for what is outlined above.      Objective   Objective     Vitals:   Temp:  [97.7 °F (36.5 °C)-98.5 °F (36.9  °C)] 97.7 °F (36.5 °C)  Heart Rate:  [58-75] 64  Resp:  [18-20] 18  BP: ()/(49-80) 125/69  Flow (L/min) (Oxygen Therapy):  [2] 2    Physical Exam   General: Awake, alert, NAD  Cardiovascular: RRR, no murmurs   Pulmonary: Bilateral decreased air entry; no wheezes; no conversational dyspnea; on 4 L/min of oxygen via NC  Gastrointestinal: S/ND/NT, +BS  Neuro: Alert, awake, oriented x 3; speech clear; no tremor      Result Review    Result Review:  I have personally reviewed these results:  [x]  Laboratory      Lab 02/23/25  0554 02/22/25  1915 02/22/25  1500   WBC 10.55  --  14.00*   HEMOGLOBIN 13.2  --  13.1   HEMATOCRIT 42.1  --  41.2   PLATELETS 268  --  232   NEUTROS ABS  --   --  12.14*   IMMATURE GRANS (ABS)  --   --  0.05   LYMPHS ABS  --   --  0.89   MONOS ABS  --   --  0.65   EOS ABS  --   --  0.16   MCV 97.7*  --  97.2*   LACTATE  --  1.0  --          Lab 02/23/25  0554 02/22/25  1500   SODIUM 141 134*   POTASSIUM 3.2* 3.5   CHLORIDE 97* 92*   CO2 33.6* 34.4*   ANION GAP 10.4 7.6   BUN 15 17   CREATININE 0.90 1.24   EGFR 91.3 62.2   GLUCOSE 155* 143*   CALCIUM 8.4* 8.9         Lab 02/22/25  1500   TOTAL PROTEIN 6.8   ALBUMIN 3.3*   GLOBULIN 3.5   ALT (SGPT) 8   AST (SGOT) 31   BILIRUBIN 0.9   ALK PHOS 145*         Lab 02/22/25  1606 02/22/25  1500   PROBNP  --  1,209.0*   HSTROP T 24* 26*                 Brief Urine Lab Results  (Last result in the past 365 days)        Color   Clarity   Blood   Leuk Est   Nitrite   Protein   CREAT   Urine HCG        11/19/24 1210 Yellow     Negative   Negative   Negative                   [x]  Microbiology   Microbiology Results (last 10 days)       Procedure Component Value - Date/Time    Respiratory Culture - Sputum, Lung, Left Lower Lobe [[id number]] Collected: 02/22/25 2041    Lab Status: Preliminary result Specimen: Sputum from Lung, Left Lower Lobe Updated: 02/23/25 0351     Gram Stain Many (4+) WBCs seen      Rare (1+) Epithelial cells seen    COVID PRE-OP /  PRE-PROCEDURE SCREENING ORDER (NO ISOLATION) - Swab, Nasopharynx [[id number]]  (Normal) Collected: 02/22/25 1513    Lab Status: Final result Specimen: Swab from Nasopharynx Updated: 02/22/25 1600    Narrative:      The following orders were created for panel order COVID PRE-OP / PRE-PROCEDURE SCREENING ORDER (NO ISOLATION) - Swab, Nasopharynx.  Procedure                               Abnormality         Status                     ---------                               -----------         ------                     COVID-19, FLU A/B, RSV P...[[id number]]  Normal              Final result                 Please view results for these tests on the individual orders.    COVID-19, FLU A/B, RSV PCR 1 HR TAT - Swab, Nasopharynx [[id number]]  (Normal) Collected: 02/22/25 1513    Lab Status: Final result Specimen: Swab from Nasopharynx Updated: 02/22/25 1600     COVID19 Not Detected     Influenza A PCR Not Detected     Influenza B PCR Not Detected     RSV, PCR Not Detected    Narrative:      Fact sheet for providers: https://www.fda.gov/media/160558/download    Fact sheet for patients: https://www.fda.gov/media/768068/download    Test performed by PCR.          [x]  Radiology  CT Chest With Contrast Diagnostic    Addendum Date: 2/22/2025    ADDENDUM:  Postoperative changes involve the cervical spine and are partially imaged on the study. There are degenerative changes throughout the imaged spine.   Portions of this note were completed with a voice recognition program.  2/22/2025 9:42 PM by Sampson Giordano MD on Workstation: [id number]      Result Date: 2/22/2025  1.  Grossly, no proximal (or central) no pulmonary embolism. 2.  There is fusiform dilatation of the ascending aorta, which measures about 5.2 cm greatest diameter. No thoracic aortic dissection is seen. 3.  Right-sided bronchopneumonia is suggested. 4.  Please see above comments for further detail.   Portions of this note were completed with a voice recognition program.   2/22/2025 9:39 PM by Sampson Giordano MD on Workstation: [id number]      CT Head Without Contrast    Result Date: 2/22/2025  Impression: No acute intracranial pathology. Electronically Signed: Sampson Brooks MD  2/22/2025 4:01 PM EST  Workstation ID: [id number]    XR Chest 1 View    Result Date: 2/22/2025  Impression: Hazy increased density over the right hemithorax may represent mild airspace disease. No dense consolidation or mass is seen. Electronically Signed: Alireza Flor MD  2/22/2025 3:13 PM EST  Workstation ID: [id number]   []  EKG/Telemetry   []  Cardiology/Vascular   []  Pathology  []  Old records  []  Other:    Assessment & Plan   Assessment / Plan     Assessment:  Acute on chronic respiratory failure with hypoxia  Right-sided bronchopneumonia  Generalized weakness, fall  CHF  Hyperlipidemia  Hypertension  A-fib on Eliquis  Chronic pain    Plan:  Patient currently being managed medicine service.  Presented with generalized weakness, fall and shortness of breath.  Found to have basilar bronchopneumonia.  On 4 L/min of oxygen via NC, continue to titrate as tolerated to maintain SpO2 greater than 90%.  On azithromycin, ceftriaxone.  Continue.  Blood culture pending.  Respiratory culture pending.  On Brovana, Pulmicort and DuoNeb.  Restarted on appropriate home medications.  Encourage incentive spirometer.  Pulm precautions.  Labs in AM  Continue rest of current management.  PT consulted.  Clinical course to determine disposition.     Discussed with RN.    VTE Prophylaxis:  Pharmacologic VTE prophylaxis orders are present.        CODE STATUS:   Level Of Support Discussed With: Patient  Code Status (Patient has no pulse and is not breathing): CPR (Attempt to Resuscitate)  Medical Interventions (Patient has pulse or is breathing): Full Support      Electronically signed by Danita Vaca MD, 02/23/25, 8:46 AM EST.

## 2025-02-23 NOTE — PLAN OF CARE
Goal Outcome Evaluation:              Outcome Evaluation: Pt is alert and oriented with moments of forgetfulness.  VSS with the exception of a lower /60.  Pt has been vomiting this shift (2 episodes).  Zofran given.  Echocardiogram was done today.  Lungs have expiratory wheezes on 4L. Pt has a noted change in mobility and requires 2x assist with a gaitbelt. Pt consult placed.  Continue POC.

## 2025-02-24 LAB
ANION GAP SERPL CALCULATED.3IONS-SCNC: 10 MMOL/L (ref 5–15)
BASOPHILS # BLD AUTO: 0.05 10*3/MM3 (ref 0–0.2)
BASOPHILS NFR BLD AUTO: 0.3 % (ref 0–1.5)
BUN SERPL-MCNC: 23 MG/DL (ref 8–23)
BUN/CREAT SERPL: 21.1 (ref 7–25)
CALCIUM SPEC-SCNC: 9.4 MG/DL (ref 8.6–10.5)
CHLORIDE SERPL-SCNC: 93 MMOL/L (ref 98–107)
CO2 SERPL-SCNC: 35 MMOL/L (ref 22–29)
CREAT SERPL-MCNC: 1.09 MG/DL (ref 0.76–1.27)
DEPRECATED RDW RBC AUTO: 47.4 FL (ref 37–54)
EGFRCR SERPLBLD CKD-EPI 2021: 72.6 ML/MIN/1.73
EOSINOPHIL # BLD AUTO: 1.23 10*3/MM3 (ref 0–0.4)
EOSINOPHIL NFR BLD AUTO: 6.3 % (ref 0.3–6.2)
ERYTHROCYTE [DISTWIDTH] IN BLOOD BY AUTOMATED COUNT: 13 % (ref 12.3–15.4)
GLUCOSE SERPL-MCNC: 128 MG/DL (ref 65–99)
HCT VFR BLD AUTO: 47.2 % (ref 37.5–51)
HGB BLD-MCNC: 14.4 G/DL (ref 13–17.7)
IMM GRANULOCYTES # BLD AUTO: 0.06 10*3/MM3 (ref 0–0.05)
IMM GRANULOCYTES NFR BLD AUTO: 0.3 % (ref 0–0.5)
LYMPHOCYTES # BLD AUTO: 0.81 10*3/MM3 (ref 0.7–3.1)
LYMPHOCYTES NFR BLD AUTO: 4.1 % (ref 19.6–45.3)
MAGNESIUM SERPL-MCNC: 2.6 MG/DL (ref 1.6–2.4)
MCH RBC QN AUTO: 30.3 PG (ref 26.6–33)
MCHC RBC AUTO-ENTMCNC: 30.5 G/DL (ref 31.5–35.7)
MCV RBC AUTO: 99.4 FL (ref 79–97)
MONOCYTES # BLD AUTO: 1.74 10*3/MM3 (ref 0.1–0.9)
MONOCYTES NFR BLD AUTO: 8.9 % (ref 5–12)
NEUTROPHILS NFR BLD AUTO: 15.67 10*3/MM3 (ref 1.7–7)
NEUTROPHILS NFR BLD AUTO: 80.1 % (ref 42.7–76)
NRBC BLD AUTO-RTO: 0 /100 WBC (ref 0–0.2)
PHOSPHATE SERPL-MCNC: 3.3 MG/DL (ref 2.5–4.5)
PLATELET # BLD AUTO: 319 10*3/MM3 (ref 140–450)
PMV BLD AUTO: 9.4 FL (ref 6–12)
POTASSIUM SERPL-SCNC: 3.3 MMOL/L (ref 3.5–5.2)
RBC # BLD AUTO: 4.75 10*6/MM3 (ref 4.14–5.8)
RBC MORPH BLD: NORMAL
SMALL PLATELETS BLD QL SMEAR: ADEQUATE
SODIUM SERPL-SCNC: 138 MMOL/L (ref 136–145)
WBC MORPH BLD: NORMAL
WBC NRBC COR # BLD AUTO: 19.56 10*3/MM3 (ref 3.4–10.8)

## 2025-02-24 PROCEDURE — 85025 COMPLETE CBC W/AUTO DIFF WBC: CPT | Performed by: STUDENT IN AN ORGANIZED HEALTH CARE EDUCATION/TRAINING PROGRAM

## 2025-02-24 PROCEDURE — 25010000002 CEFTRIAXONE PER 250 MG: Performed by: FAMILY MEDICINE

## 2025-02-24 PROCEDURE — 99232 SBSQ HOSP IP/OBS MODERATE 35: CPT | Performed by: STUDENT IN AN ORGANIZED HEALTH CARE EDUCATION/TRAINING PROGRAM

## 2025-02-24 PROCEDURE — 84100 ASSAY OF PHOSPHORUS: CPT | Performed by: STUDENT IN AN ORGANIZED HEALTH CARE EDUCATION/TRAINING PROGRAM

## 2025-02-24 PROCEDURE — 85007 BL SMEAR W/DIFF WBC COUNT: CPT | Performed by: STUDENT IN AN ORGANIZED HEALTH CARE EDUCATION/TRAINING PROGRAM

## 2025-02-24 PROCEDURE — 94799 UNLISTED PULMONARY SVC/PX: CPT

## 2025-02-24 PROCEDURE — 94664 DEMO&/EVAL PT USE INHALER: CPT

## 2025-02-24 PROCEDURE — 25010000002 ONDANSETRON PER 1 MG: Performed by: FAMILY MEDICINE

## 2025-02-24 PROCEDURE — 94761 N-INVAS EAR/PLS OXIMETRY MLT: CPT

## 2025-02-24 PROCEDURE — 80048 BASIC METABOLIC PNL TOTAL CA: CPT | Performed by: STUDENT IN AN ORGANIZED HEALTH CARE EDUCATION/TRAINING PROGRAM

## 2025-02-24 PROCEDURE — 25010000002 PROCHLORPERAZINE 10 MG/2ML SOLUTION: Performed by: STUDENT IN AN ORGANIZED HEALTH CARE EDUCATION/TRAINING PROGRAM

## 2025-02-24 PROCEDURE — 83735 ASSAY OF MAGNESIUM: CPT | Performed by: STUDENT IN AN ORGANIZED HEALTH CARE EDUCATION/TRAINING PROGRAM

## 2025-02-24 RX ORDER — PROCHLORPERAZINE EDISYLATE 5 MG/ML
5 INJECTION INTRAMUSCULAR; INTRAVENOUS EVERY 6 HOURS PRN
Status: DISCONTINUED | OUTPATIENT
Start: 2025-02-24 | End: 2025-03-07 | Stop reason: HOSPADM

## 2025-02-24 RX ORDER — POTASSIUM CHLORIDE 750 MG/1
40 CAPSULE, EXTENDED RELEASE ORAL ONCE
Status: COMPLETED | OUTPATIENT
Start: 2025-02-24 | End: 2025-02-24

## 2025-02-24 RX ADMIN — PANTOPRAZOLE SODIUM 40 MG: 40 TABLET, DELAYED RELEASE ORAL at 08:18

## 2025-02-24 RX ADMIN — Medication 250 MG: at 08:18

## 2025-02-24 RX ADMIN — CARVEDILOL 3.12 MG: 3.12 TABLET, FILM COATED ORAL at 17:47

## 2025-02-24 RX ADMIN — SENNOSIDES AND DOCUSATE SODIUM 2 TABLET: 50; 8.6 TABLET ORAL at 21:37

## 2025-02-24 RX ADMIN — ONDANSETRON 4 MG: 2 INJECTION INTRAMUSCULAR; INTRAVENOUS at 10:14

## 2025-02-24 RX ADMIN — ARFORMOTEROL TARTRATE 15 MCG: 15 SOLUTION RESPIRATORY (INHALATION) at 20:28

## 2025-02-24 RX ADMIN — ARFORMOTEROL TARTRATE 15 MCG: 15 SOLUTION RESPIRATORY (INHALATION) at 09:48

## 2025-02-24 RX ADMIN — CARVEDILOL 3.12 MG: 3.12 TABLET, FILM COATED ORAL at 08:18

## 2025-02-24 RX ADMIN — SACUBITRIL AND VALSARTAN 1 TABLET: 49; 51 TABLET, FILM COATED ORAL at 08:18

## 2025-02-24 RX ADMIN — BUDESONIDE 0.5 MG: 0.5 INHALANT RESPIRATORY (INHALATION) at 09:48

## 2025-02-24 RX ADMIN — SACUBITRIL AND VALSARTAN 1 TABLET: 49; 51 TABLET, FILM COATED ORAL at 21:37

## 2025-02-24 RX ADMIN — Medication 10 ML: at 08:18

## 2025-02-24 RX ADMIN — GUAIFENESIN 600 MG: 600 TABLET ORAL at 08:18

## 2025-02-24 RX ADMIN — BUDESONIDE 0.5 MG: 0.5 INHALANT RESPIRATORY (INHALATION) at 20:28

## 2025-02-24 RX ADMIN — HYDROCODONE BITARTRATE AND ACETAMINOPHEN 1 TABLET: 10; 325 TABLET ORAL at 01:23

## 2025-02-24 RX ADMIN — GABAPENTIN 800 MG: 400 CAPSULE ORAL at 14:28

## 2025-02-24 RX ADMIN — APIXABAN 5 MG: 5 TABLET, FILM COATED ORAL at 21:37

## 2025-02-24 RX ADMIN — PROCHLORPERAZINE EDISYLATE 5 MG: 5 INJECTION INTRAMUSCULAR; INTRAVENOUS at 11:42

## 2025-02-24 RX ADMIN — Medication 250 MG: at 21:37

## 2025-02-24 RX ADMIN — GABAPENTIN 800 MG: 400 CAPSULE ORAL at 21:37

## 2025-02-24 RX ADMIN — AMIODARONE HYDROCHLORIDE 200 MG: 200 TABLET ORAL at 08:18

## 2025-02-24 RX ADMIN — GUAIFENESIN 600 MG: 600 TABLET ORAL at 21:37

## 2025-02-24 RX ADMIN — FUROSEMIDE 40 MG: 40 TABLET ORAL at 21:37

## 2025-02-24 RX ADMIN — FUROSEMIDE 40 MG: 40 TABLET ORAL at 08:18

## 2025-02-24 RX ADMIN — POTASSIUM CHLORIDE 40 MEQ: 750 CAPSULE, EXTENDED RELEASE ORAL at 10:14

## 2025-02-24 RX ADMIN — AZITHROMYCIN DIHYDRATE 250 MG: 250 TABLET ORAL at 17:47

## 2025-02-24 RX ADMIN — IPRATROPIUM BROMIDE AND ALBUTEROL SULFATE 3 ML: .5; 3 SOLUTION RESPIRATORY (INHALATION) at 09:48

## 2025-02-24 RX ADMIN — SODIUM CHLORIDE 1000 MG: 9 INJECTION INTRAMUSCULAR; INTRAVENOUS; SUBCUTANEOUS at 17:47

## 2025-02-24 RX ADMIN — CLOPIDOGREL BISULFATE 75 MG: 75 TABLET ORAL at 08:18

## 2025-02-24 RX ADMIN — Medication 10 ML: at 21:38

## 2025-02-24 RX ADMIN — HYDROCODONE BITARTRATE AND ACETAMINOPHEN 1 TABLET: 10; 325 TABLET ORAL at 21:36

## 2025-02-24 RX ADMIN — HYDROCODONE BITARTRATE AND ACETAMINOPHEN 1 TABLET: 10; 325 TABLET ORAL at 09:01

## 2025-02-24 RX ADMIN — APIXABAN 5 MG: 5 TABLET, FILM COATED ORAL at 08:18

## 2025-02-24 NOTE — PLAN OF CARE
Problem: Adult Inpatient Plan of Care  Goal: Plan of Care Review  Outcome: Progressing  Flowsheets (Taken 2/24/2025 1604)  Progress: no change  Outcome Evaluation: Patient is disoriented to time. Patient has had complaints of pain and nausea, see EMAR. Patient has no other complaints at this time.  Plan of Care Reviewed With: patient  Goal: Patient-Specific Goal (Individualized)  Outcome: Progressing  Goal: Absence of Hospital-Acquired Illness or Injury  Outcome: Progressing  Intervention: Identify and Manage Fall Risk  Recent Flowsheet Documentation  Taken 2/24/2025 1428 by Karuna Moreno, RN  Safety Promotion/Fall Prevention:   nonskid shoes/slippers when out of bed   safety round/check completed  Taken 2/24/2025 1340 by Karuna Moreno, RN  Safety Promotion/Fall Prevention:   assistive device/personal items within reach   clutter free environment maintained   fall prevention program maintained   lighting adjusted   nonskid shoes/slippers when out of bed   room organization consistent   safety round/check completed  Taken 2/24/2025 1142 by Karuna Moreno, RN  Safety Promotion/Fall Prevention:   assistive device/personal items within reach   clutter free environment maintained   fall prevention program maintained   lighting adjusted   nonskid shoes/slippers when out of bed   room organization consistent   safety round/check completed  Taken 2/24/2025 1014 by Karuna Moreno, RN  Safety Promotion/Fall Prevention:   assistive device/personal items within reach   clutter free environment maintained   fall prevention program maintained   lighting adjusted   nonskid shoes/slippers when out of bed   room organization consistent   safety round/check completed  Taken 2/24/2025 0901 by Karuna Moreno, RN  Safety Promotion/Fall Prevention:   assistive device/personal items within reach   clutter free environment maintained   fall prevention program maintained   lighting adjusted   nonskid shoes/slippers when out of  bed   room organization consistent   safety round/check completed  Taken 2/24/2025 0819 by Karuna Moreno RN  Safety Promotion/Fall Prevention:   assistive device/personal items within reach   clutter free environment maintained   fall prevention program maintained   lighting adjusted   nonskid shoes/slippers when out of bed   room organization consistent   safety round/check completed  Taken 2/24/2025 0714 by Karuna Moreno RN  Safety Promotion/Fall Prevention:   assistive device/personal items within reach   clutter free environment maintained   fall prevention program maintained   lighting adjusted   nonskid shoes/slippers when out of bed   room organization consistent   safety round/check completed  Intervention: Prevent Infection  Recent Flowsheet Documentation  Taken 2/24/2025 1340 by Karuna Moreno RN  Infection Prevention:   cohorting utilized   environmental surveillance performed   equipment surfaces disinfected   hand hygiene promoted   personal protective equipment utilized   rest/sleep promoted   single patient room provided  Taken 2/24/2025 1142 by Karuna Moreno RN  Infection Prevention:   cohorting utilized   environmental surveillance performed   equipment surfaces disinfected   hand hygiene promoted   personal protective equipment utilized   rest/sleep promoted   single patient room provided  Taken 2/24/2025 1014 by Karuna Moreno RN  Infection Prevention:   cohorting utilized   environmental surveillance performed   equipment surfaces disinfected   hand hygiene promoted   personal protective equipment utilized   rest/sleep promoted   single patient room provided  Taken 2/24/2025 0901 by Karuna Moreno RN  Infection Prevention:   cohorting utilized   environmental surveillance performed   equipment surfaces disinfected   hand hygiene promoted   personal protective equipment utilized   rest/sleep promoted   single patient room provided  Taken 2/24/2025 0819 by Karuna Moreno,  RN  Infection Prevention:   cohorting utilized   environmental surveillance performed   equipment surfaces disinfected   hand hygiene promoted   personal protective equipment utilized   rest/sleep promoted   single patient room provided  Taken 2/24/2025 0714 by Karuna Moreno RN  Infection Prevention:   cohorting utilized   environmental surveillance performed   equipment surfaces disinfected   hand hygiene promoted   personal protective equipment utilized   rest/sleep promoted   single patient room provided  Goal: Optimal Comfort and Wellbeing  Outcome: Progressing  Intervention: Monitor Pain and Promote Comfort  Recent Flowsheet Documentation  Taken 2/24/2025 0901 by Karuna Moreno RN  Pain Management Interventions:   care clustered   pain management plan reviewed with patient/caregiver   pain medication given   quiet environment facilitated  Intervention: Provide Person-Centered Care  Recent Flowsheet Documentation  Taken 2/24/2025 0819 by Karuna Moreno RN  Trust Relationship/Rapport:   care explained   emotional support provided   choices provided   empathic listening provided   questions encouraged   questions answered   reassurance provided   thoughts/feelings acknowledged  Goal: Readiness for Transition of Care  Outcome: Progressing     Problem: Skin Injury Risk Increased  Goal: Skin Health and Integrity  Outcome: Progressing  Intervention: Optimize Skin Protection  Recent Flowsheet Documentation  Taken 2/24/2025 1428 by Karuna Moreno RN  Head of Bed (HOB) Positioning: HOB elevated     Problem: Noninvasive Ventilation Acute  Goal: Effective Unassisted Ventilation and Oxygenation  Outcome: Progressing     Problem: Fall Injury Risk  Goal: Absence of Fall and Fall-Related Injury  Outcome: Progressing  Intervention: Identify and Manage Contributors  Recent Flowsheet Documentation  Taken 2/24/2025 0819 by Karuna Moreno, RN  Medication Review/Management:   medications reviewed   high-risk medications  identified  Intervention: Promote Injury-Free Environment  Recent Flowsheet Documentation  Taken 2/24/2025 1428 by Karuna Moreno, RN  Safety Promotion/Fall Prevention:   nonskid shoes/slippers when out of bed   safety round/check completed  Taken 2/24/2025 1340 by Karuna Moreno, RN  Safety Promotion/Fall Prevention:   assistive device/personal items within reach   clutter free environment maintained   fall prevention program maintained   lighting adjusted   nonskid shoes/slippers when out of bed   room organization consistent   safety round/check completed  Taken 2/24/2025 1142 by Karuna Moreno, RN  Safety Promotion/Fall Prevention:   assistive device/personal items within reach   clutter free environment maintained   fall prevention program maintained   lighting adjusted   nonskid shoes/slippers when out of bed   room organization consistent   safety round/check completed  Taken 2/24/2025 1014 by Karuna Moreno, RN  Safety Promotion/Fall Prevention:   assistive device/personal items within reach   clutter free environment maintained   fall prevention program maintained   lighting adjusted   nonskid shoes/slippers when out of bed   room organization consistent   safety round/check completed  Taken 2/24/2025 0901 by Karuna Moreno, RN  Safety Promotion/Fall Prevention:   assistive device/personal items within reach   clutter free environment maintained   fall prevention program maintained   lighting adjusted   nonskid shoes/slippers when out of bed   room organization consistent   safety round/check completed  Taken 2/24/2025 0819 by Karuna Moreno, RN  Safety Promotion/Fall Prevention:   assistive device/personal items within reach   clutter free environment maintained   fall prevention program maintained   lighting adjusted   nonskid shoes/slippers when out of bed   room organization consistent   safety round/check completed  Taken 2/24/2025 0714 by Karuna Moreno, RN  Safety Promotion/Fall Prevention:    assistive device/personal items within reach   clutter free environment maintained   fall prevention program maintained   lighting adjusted   nonskid shoes/slippers when out of bed   room organization consistent   safety round/check completed   Goal Outcome Evaluation:  Plan of Care Reviewed With: patient        Progress: no change  Outcome Evaluation: Patient is disoriented to time. Patient has had complaints of pain and nausea, see EMAR. Patient has no other complaints at this time.

## 2025-02-24 NOTE — PROGRESS NOTES
[facility]   Hospitalist Progress Note  Date: 2025  Patient Name: Sarah Osorio  : 1955  MRN: [id number]  Date of admission: 2025  Room/Bed: [facility]      Subjective   Subjective     Chief Complaint: Generalized weakness, fall     Summary:Sarah Osorio is a 70 y.o. male with past medical history of hypertension, hyperlipidemia, diastolic CHF, A-fib on Eliquis, chronic back pain following injury, morbid obesity and GERD presented to the ED for evaluation of generalized weakness and fall.  Patient said that he has been feeling rather weak with dyspnea on exertion the last few days and around 4 AM this morning patient went to the bathroom and fell to the floor and was so weak that he could not get back up. He crawled back to his bedroom and stayed on the floor for hours until wife decided to call EMS to bring him to the ED for further evaluation.  In the ED patient was hypoxic on arrival requiring oxygen supplementation via nasal cannula with remaining vitals being within normal limits.  Labs showed leukocytosis, electrolyte imbalances, elevated proBNP and elevated but flat troponin.  Remaining labs being relatively unremarkable including negative COVID flu RSV.  CT of the chest is negative for any PE but did show some right-sided bronchopneumonia.  When seen patient was complaining of severe lower back pain but when asked he denied any recent fevers, headaches, focal weakness, chest pain, palpitation, abdominal pain, nausea, vomiting, diarrhea, constipation, dysuria, hematuria, hematochezia, melena, or anxiety.  Patient admitted for further evaluation and treatment.  Respiratory status improving.  Requiring oxygen.  Obtaining transthoracic echo to look for dilatation of ascending aorta seen on CT scan measuring 5.2 cm (seems like he had 4.7-4.9 cm aortic aorta aneurysm which has been stable in the past).  Blood culture and respiratory culture pending.    Interval Followup: No acute events  overnight. On 3L/min of O2.  Stated he feels slightly better today.  No other complaints.  Wife at bedside, stated he is more confused compared to at baseline.  Obtaining urine culture as well.  Order placed.    Review of Systems    All systems reviewed and negative except for what is outlined above.      Objective   Objective     Vitals:   Temp:  [97.7 °F (36.5 °C)-99.1 °F (37.3 °C)] 97.9 °F (36.6 °C)  Heart Rate:  [61-79] 79  Resp:  [16-23] 20  BP: (102-129)/(54-75) 129/61  Flow (L/min) (Oxygen Therapy):  [3-4] 3    Physical Exam   General: Awake, alert, NAD  Cardiovascular: RRR, no murmurs   Pulmonary: Bilateral decreased air entry; no wheezes; no conversational dyspnea; on 3 L/min of oxygen via NC  Gastrointestinal: S/ND/NT, +BS  Neuro: Alert, awake, oriented x 3; speech clear; no tremor      Result Review    Result Review:  I have personally reviewed these results:  [x]  Laboratory      Lab 02/24/25 0540 02/23/25  0554 02/22/25  1915 02/22/25  1500   WBC 19.56* 10.55  --  14.00*   HEMOGLOBIN 14.4 13.2  --  13.1   HEMATOCRIT 47.2 42.1  --  41.2   PLATELETS 319 268  --  232   NEUTROS ABS 15.67*  --   --  12.14*   IMMATURE GRANS (ABS) 0.06*  --   --  0.05   LYMPHS ABS 0.81  --   --  0.89   MONOS ABS 1.74*  --   --  0.65   EOS ABS 1.23*  --   --  0.16   MCV 99.4* 97.7*  --  97.2*   LACTATE  --   --  1.0  --          Lab 02/24/25  0540 02/23/25  0554 02/22/25  1500   SODIUM 138 141 134*   POTASSIUM 3.3* 3.2* 3.5   CHLORIDE 93* 97* 92*   CO2 35.0* 33.6* 34.4*   ANION GAP 10.0 10.4 7.6   BUN 23 15 17   CREATININE 1.09 0.90 1.24   EGFR 72.6 91.3 62.2   GLUCOSE 128* 155* 143*   CALCIUM 9.4 8.4* 8.9   MAGNESIUM 2.6* 2.2  --    PHOSPHORUS 3.3  --   --          Lab 02/22/25  1500   TOTAL PROTEIN 6.8   ALBUMIN 3.3*   GLOBULIN 3.5   ALT (SGPT) 8   AST (SGOT) 31   BILIRUBIN 0.9   ALK PHOS 145*         Lab 02/22/25  1606 02/22/25  1500   PROBNP  --  1,209.0*   HSTROP T 24* 26*                 Brief Urine Lab Results  (Last  result in the past 365 days)        Color   Clarity   Blood   Leuk Est   Nitrite   Protein   CREAT   Urine HCG        11/19/24 1210 Yellow     Negative   Negative   Negative                   [x]  Microbiology   Microbiology Results (last 10 days)       Procedure Component Value - Date/Time    Respiratory Culture - Sputum, Lung, Left Lower Lobe [[id number]] Collected: 02/22/25 2041    Lab Status: Preliminary result Specimen: Sputum from Lung, Left Lower Lobe Updated: 02/24/25 0804     Respiratory Culture Moderate growth (3+) The culture consists of normal respiratory makenzie. This is a preliminary report; final report to follow.     Gram Stain Many (4+) WBCs seen      Rare (1+) Epithelial cells seen    Blood Culture - Blood, Arm, Right [[id number]]  (Normal) Collected: 02/22/25 1915    Lab Status: Preliminary result Specimen: Blood from Arm, Right Updated: 02/23/25 1931     Blood Culture No growth at 24 hours    Narrative:      Less than seven (7) mL's of blood was collected.  Insufficient quantity may yield false negative results.    Blood Culture - Blood, Hand, Left [[id number]]  (Normal) Collected: 02/22/25 1910    Lab Status: Preliminary result Specimen: Blood from Hand, Left Updated: 02/23/25 1931     Blood Culture No growth at 24 hours    Narrative:      Less than seven (7) mL's of blood was collected.  Insufficient quantity may yield false negative results.    COVID PRE-OP / PRE-PROCEDURE SCREENING ORDER (NO ISOLATION) - Swab, Nasopharynx [[id number]]  (Normal) Collected: 02/22/25 1513    Lab Status: Final result Specimen: Swab from Nasopharynx Updated: 02/22/25 1600    Narrative:      The following orders were created for panel order COVID PRE-OP / PRE-PROCEDURE SCREENING ORDER (NO ISOLATION) - Swab, Nasopharynx.  Procedure                               Abnormality         Status                     ---------                               -----------         ------                     COVID-19, FLU A/B, RSV  P...[[id number]]  Normal              Final result                 Please view results for these tests on the individual orders.    COVID-19, FLU A/B, RSV PCR 1 HR TAT - Swab, Nasopharynx [[id number]]  (Normal) Collected: 02/22/25 1513    Lab Status: Final result Specimen: Swab from Nasopharynx Updated: 02/22/25 1600     COVID19 Not Detected     Influenza A PCR Not Detected     Influenza B PCR Not Detected     RSV, PCR Not Detected    Narrative:      Fact sheet for providers: https://www.fda.gov/media/663196/download    Fact sheet for patients: https://www.fda.gov/media/813695/download    Test performed by PCR.          [x]  Radiology  CT Chest With Contrast Diagnostic    Addendum Date: 2/22/2025    ADDENDUM:  Postoperative changes involve the cervical spine and are partially imaged on the study. There are degenerative changes throughout the imaged spine.   Portions of this note were completed with a voice recognition program.  2/22/2025 9:42 PM by Sampson Giordano MD on Workstation: [employer]      Result Date: 2/22/2025  1.  Grossly, no proximal (or central) no pulmonary embolism. 2.  There is fusiform dilatation of the ascending aorta, which measures about 5.2 cm greatest diameter. No thoracic aortic dissection is seen. 3.  Right-sided bronchopneumonia is suggested. 4.  Please see above comments for further detail.   Portions of this note were completed with a voice recognition program.  2/22/2025 9:39 PM by Sampson Giordano MD on Workstation: [employer]      CT Head Without Contrast    Result Date: 2/22/2025  Impression: No acute intracranial pathology. Electronically Signed: Sampson Brooks MD  2/22/2025 4:01 PM EST  Workstation ID: [id number]    XR Chest 1 View    Result Date: 2/22/2025  Impression: Hazy increased density over the right hemithorax may represent mild airspace disease. No dense consolidation or mass is seen. Electronically Signed: Alireza Flor MD  2/22/2025 3:13 PM EST  Workstation ID: [id number]   []   EKG/Telemetry   []  Cardiology/Vascular   []  Pathology  []  Old records  []  Other:    Assessment & Plan   Assessment / Plan     Assessment:  Acute on chronic respiratory failure with hypoxia  Right-sided bronchopneumonia  Generalized weakness, fall  CHF  Hyperlipidemia  Hypertension  A-fib on Eliquis  Chronic pain    Plan:  Patient currently being managed medicine service.  Presented with generalized weakness, fall and shortness of breath.  Found to have basilar bronchopneumonia.  On 3 L/min of oxygen via NC, continue to titrate as tolerated to maintain SpO2 greater than 90%.  Not on oxygen at baseline  On azithromycin, ceftriaxone.  Continue.  Blood culture showing no growth at 24 hours.  Respiratory culture pending.  On Brovana, Pulmicort and DuoNeb.  Restarted on appropriate home medications.  Encourage incentive spirometer.  UA sent for concern of UTI.  Pending.  Labs in AM  Continue rest of current management.  PT consulted.  6-minute walk test prior to  Clinical course to determine disposition.     Discussed with RN.    VTE Prophylaxis:  Pharmacologic VTE prophylaxis orders are present.        CODE STATUS:   Level Of Support Discussed With: Patient  Code Status (Patient has no pulse and is not breathing): CPR (Attempt to Resuscitate)  Medical Interventions (Patient has pulse or is breathing): Full Support      Electronically signed by Danita Vaca MD, 02/24/25, 8:46 AM EST.

## 2025-02-24 NOTE — PROGRESS NOTES
RT EQUIPMENT DEVICE RELATED - SKIN ASSESSMENT    RT Medical Equipment/Device:     NIV Mask:  Under-the-nose   size:  B    Skin Assessment:      Cheek:  Intact  Chin:  Intact  Ears:  Intact  Nares:  Intact  Lips:  Intact  Mouth:  Intact    Device Skin Pressure Protection:  Pressure points protected    Nurse Notification:  Roseanna Eason, RRT

## 2025-02-24 NOTE — PLAN OF CARE
Goal Outcome Evaluation:  Plan of Care Reviewed With: patient        Progress: no change  Outcome Evaluation: Pt refused to wear BiPAP last night due to previous vomiting. Unit on standby in room. Pt currently on 3L NC, tolerating well.

## 2025-02-24 NOTE — PROGRESS NOTES
RT EQUIPMENT DEVICE RELATED - SKIN ASSESSMENT    RT Medical Equipment/Device:     NIV Mask:  Under-the-nose   size:  b    Skin Assessment:      Cheek:  Intact  Chin:  Intact  Nose:  Intact    Device Skin Pressure Protection:  Pressure points protected    Nurse Notification:  Roseanna Issa, RRT

## 2025-02-24 NOTE — PLAN OF CARE
Goal Outcome Evaluation:  Plan of Care Reviewed With: patient        Progress: no change  Outcome Evaluation: Patient did not wear bipap tonight. Patient stated bipap may have contributed to vomiting and stomach discomfort  after wearing during the previous night. Oxygen demand has decreased, have titrated fio2 from 4L to 3L nasal cannula.

## 2025-02-25 ENCOUNTER — APPOINTMENT (OUTPATIENT)
Dept: GENERAL RADIOLOGY | Facility: HOSPITAL | Age: 70
DRG: 193 | End: 2025-02-25
Payer: MEDICARE

## 2025-02-25 LAB
ANION GAP SERPL CALCULATED.3IONS-SCNC: 7 MMOL/L (ref 5–15)
B PARAPERT DNA SPEC QL NAA+PROBE: NOT DETECTED
B PERT DNA SPEC QL NAA+PROBE: NOT DETECTED
BACTERIA SPEC RESP CULT: NORMAL
BASOPHILS # BLD AUTO: 0.04 10*3/MM3 (ref 0–0.2)
BASOPHILS NFR BLD AUTO: 0.2 % (ref 0–1.5)
BUN SERPL-MCNC: 20 MG/DL (ref 8–23)
BUN/CREAT SERPL: 21.1 (ref 7–25)
C PNEUM DNA NPH QL NAA+NON-PROBE: NOT DETECTED
CALCIUM SPEC-SCNC: 9 MG/DL (ref 8.6–10.5)
CHLORIDE SERPL-SCNC: 96 MMOL/L (ref 98–107)
CO2 SERPL-SCNC: 38 MMOL/L (ref 22–29)
CREAT SERPL-MCNC: 0.95 MG/DL (ref 0.76–1.27)
DEPRECATED RDW RBC AUTO: 48.7 FL (ref 37–54)
EGFRCR SERPLBLD CKD-EPI 2021: 85.6 ML/MIN/1.73
EOSINOPHIL # BLD AUTO: 0 10*3/MM3 (ref 0–0.4)
EOSINOPHIL NFR BLD AUTO: 0 % (ref 0.3–6.2)
ERYTHROCYTE [DISTWIDTH] IN BLOOD BY AUTOMATED COUNT: 13 % (ref 12.3–15.4)
FLUAV SUBTYP SPEC NAA+PROBE: NOT DETECTED
FLUBV RNA ISLT QL NAA+PROBE: NOT DETECTED
GLUCOSE SERPL-MCNC: 137 MG/DL (ref 65–99)
GRAM STN SPEC: NORMAL
GRAM STN SPEC: NORMAL
HADV DNA SPEC NAA+PROBE: NOT DETECTED
HCOV 229E RNA SPEC QL NAA+PROBE: NOT DETECTED
HCOV HKU1 RNA SPEC QL NAA+PROBE: NOT DETECTED
HCOV NL63 RNA SPEC QL NAA+PROBE: DETECTED
HCOV OC43 RNA SPEC QL NAA+PROBE: NOT DETECTED
HCT VFR BLD AUTO: 44.3 % (ref 37.5–51)
HGB BLD-MCNC: 13.2 G/DL (ref 13–17.7)
HMPV RNA NPH QL NAA+NON-PROBE: NOT DETECTED
HPIV1 RNA ISLT QL NAA+PROBE: NOT DETECTED
HPIV2 RNA SPEC QL NAA+PROBE: NOT DETECTED
HPIV3 RNA NPH QL NAA+PROBE: NOT DETECTED
HPIV4 P GENE NPH QL NAA+PROBE: NOT DETECTED
IMM GRANULOCYTES # BLD AUTO: 0.12 10*3/MM3 (ref 0–0.05)
IMM GRANULOCYTES NFR BLD AUTO: 0.7 % (ref 0–0.5)
L PNEUMO1 AG UR QL IA: NEGATIVE
LYMPHOCYTES # BLD AUTO: 0.79 10*3/MM3 (ref 0.7–3.1)
LYMPHOCYTES NFR BLD AUTO: 4.5 % (ref 19.6–45.3)
M PNEUMO IGG SER IA-ACNC: NOT DETECTED
MAGNESIUM SERPL-MCNC: 2.4 MG/DL (ref 1.6–2.4)
MCH RBC QN AUTO: 30 PG (ref 26.6–33)
MCHC RBC AUTO-ENTMCNC: 29.8 G/DL (ref 31.5–35.7)
MCV RBC AUTO: 100.7 FL (ref 79–97)
MONOCYTES # BLD AUTO: 1.06 10*3/MM3 (ref 0.1–0.9)
MONOCYTES NFR BLD AUTO: 6 % (ref 5–12)
NEUTROPHILS NFR BLD AUTO: 15.54 10*3/MM3 (ref 1.7–7)
NEUTROPHILS NFR BLD AUTO: 88.6 % (ref 42.7–76)
NRBC BLD AUTO-RTO: 0 /100 WBC (ref 0–0.2)
PHOSPHATE SERPL-MCNC: 2.8 MG/DL (ref 2.5–4.5)
PLATELET # BLD AUTO: 273 10*3/MM3 (ref 140–450)
PMV BLD AUTO: 9.4 FL (ref 6–12)
POTASSIUM SERPL-SCNC: 3.9 MMOL/L (ref 3.5–5.2)
RBC # BLD AUTO: 4.4 10*6/MM3 (ref 4.14–5.8)
RHINOVIRUS RNA SPEC NAA+PROBE: NOT DETECTED
RSV RNA NPH QL NAA+NON-PROBE: NOT DETECTED
S PNEUM AG SPEC QL LA: NEGATIVE
SARS-COV-2 RNA RESP QL NAA+PROBE: DETECTED
SODIUM SERPL-SCNC: 141 MMOL/L (ref 136–145)
WBC NRBC COR # BLD AUTO: 17.55 10*3/MM3 (ref 3.4–10.8)

## 2025-02-25 PROCEDURE — 94799 UNLISTED PULMONARY SVC/PX: CPT

## 2025-02-25 PROCEDURE — 87449 NOS EACH ORGANISM AG IA: CPT | Performed by: STUDENT IN AN ORGANIZED HEALTH CARE EDUCATION/TRAINING PROGRAM

## 2025-02-25 PROCEDURE — 84100 ASSAY OF PHOSPHORUS: CPT | Performed by: STUDENT IN AN ORGANIZED HEALTH CARE EDUCATION/TRAINING PROGRAM

## 2025-02-25 PROCEDURE — 94660 CPAP INITIATION&MGMT: CPT

## 2025-02-25 PROCEDURE — 25010000002 CEFTRIAXONE PER 250 MG: Performed by: STUDENT IN AN ORGANIZED HEALTH CARE EDUCATION/TRAINING PROGRAM

## 2025-02-25 PROCEDURE — 71045 X-RAY EXAM CHEST 1 VIEW: CPT

## 2025-02-25 PROCEDURE — 80048 BASIC METABOLIC PNL TOTAL CA: CPT | Performed by: STUDENT IN AN ORGANIZED HEALTH CARE EDUCATION/TRAINING PROGRAM

## 2025-02-25 PROCEDURE — 0202U NFCT DS 22 TRGT SARS-COV-2: CPT | Performed by: STUDENT IN AN ORGANIZED HEALTH CARE EDUCATION/TRAINING PROGRAM

## 2025-02-25 PROCEDURE — 85025 COMPLETE CBC W/AUTO DIFF WBC: CPT | Performed by: STUDENT IN AN ORGANIZED HEALTH CARE EDUCATION/TRAINING PROGRAM

## 2025-02-25 PROCEDURE — 94664 DEMO&/EVAL PT USE INHALER: CPT

## 2025-02-25 PROCEDURE — 94761 N-INVAS EAR/PLS OXIMETRY MLT: CPT

## 2025-02-25 PROCEDURE — 63710000001 DEXAMETHASONE PER 0.25 MG: Performed by: STUDENT IN AN ORGANIZED HEALTH CARE EDUCATION/TRAINING PROGRAM

## 2025-02-25 PROCEDURE — 99232 SBSQ HOSP IP/OBS MODERATE 35: CPT | Performed by: STUDENT IN AN ORGANIZED HEALTH CARE EDUCATION/TRAINING PROGRAM

## 2025-02-25 PROCEDURE — 83735 ASSAY OF MAGNESIUM: CPT | Performed by: STUDENT IN AN ORGANIZED HEALTH CARE EDUCATION/TRAINING PROGRAM

## 2025-02-25 RX ORDER — DEXAMETHASONE 4 MG/1
6 TABLET ORAL
Status: DISCONTINUED | OUTPATIENT
Start: 2025-02-25 | End: 2025-02-25

## 2025-02-25 RX ORDER — DEXAMETHASONE 4 MG/1
6 TABLET ORAL
Status: DISCONTINUED | OUTPATIENT
Start: 2025-02-25 | End: 2025-02-27

## 2025-02-25 RX ADMIN — HYDROCODONE BITARTRATE AND ACETAMINOPHEN 1 TABLET: 10; 325 TABLET ORAL at 06:15

## 2025-02-25 RX ADMIN — GUAIFENESIN 600 MG: 600 TABLET ORAL at 08:11

## 2025-02-25 RX ADMIN — CLOPIDOGREL BISULFATE 75 MG: 75 TABLET ORAL at 08:11

## 2025-02-25 RX ADMIN — ARFORMOTEROL TARTRATE 15 MCG: 15 SOLUTION RESPIRATORY (INHALATION) at 20:00

## 2025-02-25 RX ADMIN — AMIODARONE HYDROCHLORIDE 200 MG: 200 TABLET ORAL at 08:11

## 2025-02-25 RX ADMIN — ARFORMOTEROL TARTRATE 15 MCG: 15 SOLUTION RESPIRATORY (INHALATION) at 07:24

## 2025-02-25 RX ADMIN — FUROSEMIDE 40 MG: 40 TABLET ORAL at 08:11

## 2025-02-25 RX ADMIN — GABAPENTIN 800 MG: 400 CAPSULE ORAL at 13:12

## 2025-02-25 RX ADMIN — SACUBITRIL AND VALSARTAN 1 TABLET: 49; 51 TABLET, FILM COATED ORAL at 08:11

## 2025-02-25 RX ADMIN — GABAPENTIN 800 MG: 400 CAPSULE ORAL at 06:13

## 2025-02-25 RX ADMIN — SENNOSIDES AND DOCUSATE SODIUM 2 TABLET: 50; 8.6 TABLET ORAL at 21:36

## 2025-02-25 RX ADMIN — SODIUM CHLORIDE 1000 MG: 9 INJECTION INTRAMUSCULAR; INTRAVENOUS; SUBCUTANEOUS at 17:14

## 2025-02-25 RX ADMIN — Medication 10 ML: at 08:12

## 2025-02-25 RX ADMIN — Medication 10 ML: at 21:37

## 2025-02-25 RX ADMIN — Medication 250 MG: at 08:11

## 2025-02-25 RX ADMIN — GUAIFENESIN 600 MG: 600 TABLET ORAL at 21:36

## 2025-02-25 RX ADMIN — BUDESONIDE 0.5 MG: 0.5 INHALANT RESPIRATORY (INHALATION) at 20:00

## 2025-02-25 RX ADMIN — HYDROCODONE BITARTRATE AND ACETAMINOPHEN 1 TABLET: 10; 325 TABLET ORAL at 13:13

## 2025-02-25 RX ADMIN — FUROSEMIDE 40 MG: 40 TABLET ORAL at 21:36

## 2025-02-25 RX ADMIN — BUDESONIDE 0.5 MG: 0.5 INHALANT RESPIRATORY (INHALATION) at 07:24

## 2025-02-25 RX ADMIN — CARVEDILOL 3.12 MG: 3.12 TABLET, FILM COATED ORAL at 08:11

## 2025-02-25 RX ADMIN — BISACODYL 5 MG: 5 TABLET, COATED ORAL at 21:36

## 2025-02-25 RX ADMIN — SACUBITRIL AND VALSARTAN 1 TABLET: 49; 51 TABLET, FILM COATED ORAL at 21:36

## 2025-02-25 RX ADMIN — AZITHROMYCIN DIHYDRATE 250 MG: 250 TABLET ORAL at 17:14

## 2025-02-25 RX ADMIN — Medication 250 MG: at 21:37

## 2025-02-25 RX ADMIN — GABAPENTIN 800 MG: 400 CAPSULE ORAL at 21:37

## 2025-02-25 RX ADMIN — CARVEDILOL 3.12 MG: 3.12 TABLET, FILM COATED ORAL at 17:14

## 2025-02-25 RX ADMIN — APIXABAN 5 MG: 5 TABLET, FILM COATED ORAL at 21:36

## 2025-02-25 RX ADMIN — PANTOPRAZOLE SODIUM 40 MG: 40 TABLET, DELAYED RELEASE ORAL at 08:11

## 2025-02-25 RX ADMIN — HYDROCODONE BITARTRATE AND ACETAMINOPHEN 1 TABLET: 10; 325 TABLET ORAL at 21:35

## 2025-02-25 RX ADMIN — POLYETHYLENE GLYCOL 3350 17 G: 17 POWDER, FOR SOLUTION ORAL at 08:11

## 2025-02-25 RX ADMIN — APIXABAN 5 MG: 5 TABLET, FILM COATED ORAL at 08:11

## 2025-02-25 RX ADMIN — DEXAMETHASONE 6 MG: 4 TABLET ORAL at 13:12

## 2025-02-25 RX ADMIN — IPRATROPIUM BROMIDE AND ALBUTEROL SULFATE 3 ML: .5; 3 SOLUTION RESPIRATORY (INHALATION) at 07:25

## 2025-02-25 NOTE — THERAPY EVALUATION
RT EQUIPMENT DEVICE RELATED - SKIN ASSESSMENT    RT Medical Equipment/Device:     NIV Mask:  Under-the-nose   size:  v    Skin Assessment:      Cheek:  Intact  Chin:  Intact  Nares:  Intact  Neck:  Intact  Mouth:  Intact    Device Skin Pressure Protection:  Pressure points protected    Nurse Notification:  Roseanna Cruz, RRT

## 2025-02-25 NOTE — THERAPY EVALUATION
RT EQUIPMENT DEVICE RELATED - SKIN ASSESSMENT    RT Medical Equipment/Device:     NIV Mask:  Under-the-nose   size:  B    Skin Assessment:      MOUTH, NOSE, CHEEKS:  Intact    Device Skin Pressure Protection:  Positioning supports utilized    Nurse Notification:  Roseanna Weir, RRT

## 2025-02-25 NOTE — PLAN OF CARE
Goal Outcome Evaluation:Pt wore BIPAP for approx 1 hour @ 14/6,  42%. Rate at 16, per current order, no rate, changed to 4. Fio2 at 42% and he is on 4 L when not on BIPAP.

## 2025-02-25 NOTE — PLAN OF CARE
Goal Outcome Evaluation:  Plan of Care Reviewed With: patient        Progress: no change     Pt more alert and oriented this shift compared to previous night shift. Pain medication administered x 2 this shift. No s/s of distress observed.

## 2025-02-25 NOTE — PROGRESS NOTES
[facility]   Hospitalist Progress Note  Date: 2025  Patient Name: Sarah Osorio  : 1955  MRN: [id number]  Date of admission: 2025  Room/Bed: [facility]      Subjective   Subjective     Chief Complaint: Weakness, fall    Summary:Sarah Osorio is a 70 y.o. male with past medical history of hypertension, hyperlipidemia, diastolic CHF, A-fib on Eliquis, chronic back pain following injury, morbid obesity and GERD presented to the ED for evaluation of generalized weakness and fall.  Patient said that he has been feeling rather weak with dyspnea on exertion the last few days and around 4 AM this morning patient went to the bathroom and fell to the floor and was so weak that he could not get back up. He crawled back to his bedroom and stayed on the floor for hours until wife decided to call EMS to bring him to the ED for further evaluation.  In the ED patient was hypoxic on arrival requiring oxygen supplementation via nasal cannula with remaining vitals being within normal limits.  Labs showed leukocytosis, electrolyte imbalances, elevated proBNP and elevated but flat troponin.  Remaining labs being relatively unremarkable including negative COVID flu RSV.  CT of the chest is negative for any PE but did show some right-sided bronchopneumonia.  When seen patient was complaining of severe lower back pain but when asked he denied any recent fevers, headaches, focal weakness, chest pain, palpitation, abdominal pain, nausea, vomiting, diarrhea, constipation, dysuria, hematuria, hematochezia, melena, or anxiety.  Patient admitted for further evaluation and treatment.  Respiratory status improving.  Requiring oxygen.  Obtaining transthoracic echo to look for dilatation of ascending aorta seen on CT scan measuring 5.2 cm (seems like he had 4.7-4.9 cm aortic aorta aneurysm which has been stable in the past).  Blood culture and respiratory culture pending.     Interval Followup: No acute overnight events.  No acute  distress.  Patient is sitting up and resting comfortably, on 4 L supplemental O2.  He can answer orientation questions correctly, does take a while to think about his answers.  Wife reports that this is not his baseline and she feels that his mental status is confused.  Went over care plan and answered all questions and concerns.      Objective   Objective     Vitals:   Temp:  [98 °F (36.7 °C)-99.1 °F (37.3 °C)] 98.4 °F (36.9 °C)  Heart Rate:  [63-93] 69  Resp:  [18-20] 18  BP: (122-132)/(61-74) 122/67  Flow (L/min) (Oxygen Therapy):  [3-4] 4    Physical Exam   Gen: NAD, Alert and Oriented  Cards: RRR  Pulm: Nasal cannula in place, diffuse wheezing  Abd: soft, nondistended  Extremities: no pitting edema    Result Review    Result Review:  I have personally reviewed these results:  [x]  Laboratory      Lab 02/25/25  0533 02/24/25  0540 02/23/25  0554 02/22/25  1915 02/22/25  1500   WBC 17.55* 19.56* 10.55  --  14.00*   HEMOGLOBIN 13.2 14.4 13.2  --  13.1   HEMATOCRIT 44.3 47.2 42.1  --  41.2   PLATELETS 273 319 268  --  232   NEUTROS ABS 15.54* 15.67*  --   --  12.14*   IMMATURE GRANS (ABS) 0.12* 0.06*  --   --  0.05   LYMPHS ABS 0.79 0.81  --   --  0.89   MONOS ABS 1.06* 1.74*  --   --  0.65   EOS ABS 0.00 1.23*  --   --  0.16   .7* 99.4* 97.7*  --  97.2*   LACTATE  --   --   --  1.0  --          Lab 02/25/25  0534 02/24/25  0540 02/23/25  0554   SODIUM 141 138 141   POTASSIUM 3.9 3.3* 3.2*   CHLORIDE 96* 93* 97*   CO2 38.0* 35.0* 33.6*   ANION GAP 7.0 10.0 10.4   BUN 20 23 15   CREATININE 0.95 1.09 0.90   EGFR 85.6 72.6 91.3   GLUCOSE 137* 128* 155*   CALCIUM 9.0 9.4 8.4*   MAGNESIUM 2.4 2.6* 2.2   PHOSPHORUS 2.8 3.3  --          Lab 02/22/25  1500   TOTAL PROTEIN 6.8   ALBUMIN 3.3*   GLOBULIN 3.5   ALT (SGPT) 8   AST (SGOT) 31   BILIRUBIN 0.9   ALK PHOS 145*         Lab 02/22/25  1606 02/22/25  1500   PROBNP  --  1,209.0*   HSTROP T 24* 26*                 Brief Urine Lab Results  (Last result in the past  365 days)        Color   Clarity   Blood   Leuk Est   Nitrite   Protein   CREAT   Urine HCG        11/19/24 1210 Yellow     Negative   Negative   Negative                   [x]  Microbiology   Microbiology Results (last 10 days)       Procedure Component Value - Date/Time    Respiratory Panel PCR w/COVID-19(SARS-CoV-2) CAREN/TYRELL/ONEAL/PAD/COR/AMRITA In-House, NP Swab in UTM/VTM, 2 HR TAT - Swab, Nasopharynx [[id number]]  (Abnormal) Collected: 02/25/25 0919    Lab Status: Final result Specimen: Swab from Nasopharynx Updated: 02/25/25 1119     ADENOVIRUS, PCR Not Detected     Coronavirus 229E Not Detected     Coronavirus HKU1 Not Detected     Coronavirus NL63 Detected     Coronavirus OC43 Not Detected     COVID19 Detected     Human Metapneumovirus Not Detected     Human Rhinovirus/Enterovirus Not Detected     Influenza A PCR Not Detected     Influenza B PCR Not Detected     Parainfluenza Virus 1 Not Detected     Parainfluenza Virus 2 Not Detected     Parainfluenza Virus 3 Not Detected     Parainfluenza Virus 4 Not Detected     RSV, PCR Not Detected     Bordetella pertussis pcr Not Detected     Bordetella parapertussis PCR Not Detected     Chlamydophila pneumoniae PCR Not Detected     Mycoplasma pneumo by PCR Not Detected    Narrative:      In the setting of a positive respiratory panel with a viral infection PLUS a negative procalcitonin without other underlying concern for bacterial infection, consider observing off antibiotics or discontinuation of antibiotics and continue supportive care. If the respiratory panel is positive for atypical bacterial infection (Bordetella pertussis, Chlamydophila pneumoniae, or Mycoplasma pneumoniae), consider antibiotic de-escalation to target atypical bacterial infection.    Respiratory Culture - Sputum, Lung, Left Lower Lobe [[id number]] Collected: 02/22/25 2041    Lab Status: Final result Specimen: Sputum from Lung, Left Lower Lobe Updated: 02/25/25 1007     Respiratory Culture Moderate  growth (3+) Normal respiratory makenzie. No S. aureus or Pseudomonas aeruginosa detected. Final report.     Gram Stain Many (4+) WBCs seen      Rare (1+) Epithelial cells seen    Blood Culture - Blood, Arm, Right [[id number]]  (Normal) Collected: 02/22/25 1915    Lab Status: Preliminary result Specimen: Blood from Arm, Right Updated: 02/24/25 1931     Blood Culture No growth at 2 days    Narrative:      Less than seven (7) mL's of blood was collected.  Insufficient quantity may yield false negative results.    Blood Culture - Blood, Hand, Left [[id number]]  (Normal) Collected: 02/22/25 1910    Lab Status: Preliminary result Specimen: Blood from Hand, Left Updated: 02/24/25 1931     Blood Culture No growth at 2 days    Narrative:      Less than seven (7) mL's of blood was collected.  Insufficient quantity may yield false negative results.    COVID PRE-OP / PRE-PROCEDURE SCREENING ORDER (NO ISOLATION) - Swab, Nasopharynx [[id number]]  (Normal) Collected: 02/22/25 1513    Lab Status: Final result Specimen: Swab from Nasopharynx Updated: 02/22/25 1600    Narrative:      The following orders were created for panel order COVID PRE-OP / PRE-PROCEDURE SCREENING ORDER (NO ISOLATION) - Swab, Nasopharynx.  Procedure                               Abnormality         Status                     ---------                               -----------         ------                     COVID-19, FLU A/B, RSV P...[[id number]]  Normal              Final result                 Please view results for these tests on the individual orders.    COVID-19, FLU A/B, RSV PCR 1 HR TAT - Swab, Nasopharynx [[id number]]  (Normal) Collected: 02/22/25 1513    Lab Status: Final result Specimen: Swab from Nasopharynx Updated: 02/22/25 1600     COVID19 Not Detected     Influenza A PCR Not Detected     Influenza B PCR Not Detected     RSV, PCR Not Detected    Narrative:      Fact sheet for providers: https://www.fda.gov/media/356545/download    Fact sheet for  patients: https://www.[employer].gov/media/399086/download    Test performed by PCR.          [x]  Radiology  XR Chest 1 View    Result Date: 2/25/2025  Impression: 1. Low lung volumes with bilateral perihilar airspace opacities likely representing combination of atelectasis and bronchopneumonia. Electronically Signed: Georgeskatiuska MoellerOchoa  2/25/2025 3:50 PM EST  Workstation ID: [id number]    CT Chest With Contrast Diagnostic    Addendum Date: 2/22/2025    ADDENDUM:  Postoperative changes involve the cervical spine and are partially imaged on the study. There are degenerative changes throughout the imaged spine.   Portions of this note were completed with a voice recognition program.  2/22/2025 9:42 PM by Sampson Giordano MD on Workstation: [employer]      Result Date: 2/22/2025  1.  Grossly, no proximal (or central) no pulmonary embolism. 2.  There is fusiform dilatation of the ascending aorta, which measures about 5.2 cm greatest diameter. No thoracic aortic dissection is seen. 3.  Right-sided bronchopneumonia is suggested. 4.  Please see above comments for further detail.   Portions of this note were completed with a voice recognition program.  2/22/2025 9:39 PM by Sampson Giordano MD on Workstation: [employer]      CT Head Without Contrast    Result Date: 2/22/2025  Impression: No acute intracranial pathology. Electronically Signed: Sampson Brooks MD  2/22/2025 4:01 PM EST  Workstation ID: [id number]    XR Chest 1 View    Result Date: 2/22/2025  Impression: Hazy increased density over the right hemithorax may represent mild airspace disease. No dense consolidation or mass is seen. Electronically Signed: Alireza Flor MD  2/22/2025 3:13 PM EST  Workstation ID: [id number]   []  EKG/Telemetry   []  Cardiology/Vascular   []  Pathology  []  Old records  []  Other:    Assessment & Plan   Assessment / Plan     Assessment:  Acute hypoxic respiratory failure  Right side pneumonia, unknown bacterial origin  COVID-19 positive  Generalized weakness  secondary to above  HFpEF, not in acute exacerbation  Hyperlipidemia  Hypertension  A-fib on Eliquis  Leukocytosis    Plan:  Continue inpatient admission  On 4 L supplemental O2, continue to wean as tolerated  Continue IV Rocephin and azithromycin  Respiratory culture normal makenzie.  Strep pneumo and Legionella ordered.  Respiratory PCR positive for COVID-19 and coronavirus strain.  Precautions ordered.  Given increased O2 needs will start Decadron 6 mg daily for total 10 days.  Repeat chest x-ray today  Brovana, Pulmicort, DuoNebs  Incentive spirometer, Acapella, chest vest  Urinalysis pending  Leukocytosis improving  PT/OT  A.m. labs       Discussed with RN.    VTE Prophylaxis:  Pharmacologic VTE prophylaxis orders are present.        CODE STATUS:   Level Of Support Discussed With: Patient  Code Status (Patient has no pulse and is not breathing): CPR (Attempt to Resuscitate)  Medical Interventions (Patient has pulse or is breathing): Full Support      Electronically signed by Anahy Subramanian DO, 2/25/2025, 16:07 EST.

## 2025-02-25 NOTE — THERAPY EVALUATION
RT EQUIPMENT DEVICE RELATED - SKIN ASSESSMENT    RT Medical Equipment/Device:     NIV Mask:  Under-the-nose   size:  b    Skin Assessment:      Cheek:  Intact  Chin:  Intact  Nose:  Intact    Device Skin Pressure Protection:  Positioning supports utilized    Nurse Notification:  Roseanna Brito, RRT

## 2025-02-25 NOTE — PLAN OF CARE
Goal Outcome Evaluation:  Plan of Care Reviewed With: patient           Outcome Evaluation: Patient is wearing 4L humidified nasal cannula. Bipap is at bedside on standby.

## 2025-02-26 ENCOUNTER — APPOINTMENT (OUTPATIENT)
Dept: CT IMAGING | Facility: HOSPITAL | Age: 70
DRG: 193 | End: 2025-02-26
Payer: MEDICARE

## 2025-02-26 LAB
ANION GAP SERPL CALCULATED.3IONS-SCNC: 5.4 MMOL/L (ref 5–15)
ARTERIAL PATENCY WRIST A: POSITIVE
ARTERIAL PATENCY WRIST A: POSITIVE
ATMOSPHERIC PRESS: 737.1 MMHG
ATMOSPHERIC PRESS: 738.4 MMHG
BASE EXCESS BLDA CALC-SCNC: 12.3 MMOL/L (ref -2–2)
BASE EXCESS BLDA CALC-SCNC: 16.6 MMOL/L (ref -2–2)
BASOPHILS # BLD AUTO: 0.03 10*3/MM3 (ref 0–0.2)
BASOPHILS NFR BLD AUTO: 0.3 % (ref 0–1.5)
BDY SITE: ABNORMAL
BDY SITE: ABNORMAL
BILIRUB UR QL STRIP: NEGATIVE
BUN SERPL-MCNC: 19 MG/DL (ref 8–23)
BUN/CREAT SERPL: 22.9 (ref 7–25)
CALCIUM SPEC-SCNC: 8.9 MG/DL (ref 8.6–10.5)
CHLORIDE SERPL-SCNC: 93 MMOL/L (ref 98–107)
CLARITY UR: CLEAR
CO2 SERPL-SCNC: 38.6 MMOL/L (ref 22–29)
COLOR UR: YELLOW
CREAT SERPL-MCNC: 0.83 MG/DL (ref 0.76–1.27)
DEPRECATED RDW RBC AUTO: 46.9 FL (ref 37–54)
EGFRCR SERPLBLD CKD-EPI 2021: 93.6 ML/MIN/1.73
EOSINOPHIL # BLD AUTO: 0 10*3/MM3 (ref 0–0.4)
EOSINOPHIL NFR BLD AUTO: 0 % (ref 0.3–6.2)
ERYTHROCYTE [DISTWIDTH] IN BLOOD BY AUTOMATED COUNT: 12.6 % (ref 12.3–15.4)
GAS FLOW AIRWAY: 2 LPM
GAS FLOW AIRWAY: 2.5 LPM
GLUCOSE SERPL-MCNC: 136 MG/DL (ref 65–99)
GLUCOSE UR STRIP-MCNC: NEGATIVE MG/DL
HCO3 BLDA-SCNC: 40.1 MMOL/L (ref 22–26)
HCO3 BLDA-SCNC: 46.8 MMOL/L (ref 22–26)
HCT VFR BLD AUTO: 43.8 % (ref 37.5–51)
HCT VFR BLD CALC: 47 % (ref 38–51)
HCT VFR BLD CALC: 48 % (ref 38–51)
HEMODILUTION: NO
HEMODILUTION: NO
HGB BLD-MCNC: 13.5 G/DL (ref 13–17.7)
HGB BLDA-MCNC: 15.9 G/DL (ref 12–18)
HGB BLDA-MCNC: 16.4 G/DL (ref 12–18)
HGB UR QL STRIP.AUTO: NEGATIVE
IMM GRANULOCYTES # BLD AUTO: 0.13 10*3/MM3 (ref 0–0.05)
IMM GRANULOCYTES NFR BLD AUTO: 1.3 % (ref 0–0.5)
INHALED O2 CONCENTRATION: 28 %
KETONES UR QL STRIP: NEGATIVE
LEUKOCYTE ESTERASE UR QL STRIP.AUTO: NEGATIVE
LYMPHOCYTES # BLD AUTO: 0.71 10*3/MM3 (ref 0.7–3.1)
LYMPHOCYTES NFR BLD AUTO: 6.9 % (ref 19.6–45.3)
Lab: ABNORMAL
Lab: ABNORMAL
MAGNESIUM SERPL-MCNC: 2.2 MG/DL (ref 1.6–2.4)
MCH RBC QN AUTO: 30.8 PG (ref 26.6–33)
MCHC RBC AUTO-ENTMCNC: 30.8 G/DL (ref 31.5–35.7)
MCV RBC AUTO: 100 FL (ref 79–97)
MODALITY: ABNORMAL
MODALITY: ABNORMAL
MONOCYTES # BLD AUTO: 0.49 10*3/MM3 (ref 0.1–0.9)
MONOCYTES NFR BLD AUTO: 4.8 % (ref 5–12)
NEUTROPHILS NFR BLD AUTO: 8.9 10*3/MM3 (ref 1.7–7)
NEUTROPHILS NFR BLD AUTO: 86.7 % (ref 42.7–76)
NITRITE UR QL STRIP: NEGATIVE
NOTIFIED WHO: ABNORMAL
NOTIFIED WHO: ABNORMAL
NRBC BLD AUTO-RTO: 0 /100 WBC (ref 0–0.2)
PCO2 BLDA: 61.8 MM HG (ref 35–45)
PCO2 BLDA: 79 MM HG (ref 35–45)
PH BLDA: 7.38 PH UNITS (ref 7.35–7.45)
PH BLDA: 7.42 PH UNITS (ref 7.35–7.45)
PH UR STRIP.AUTO: 6 [PH] (ref 5–8)
PHOSPHATE SERPL-MCNC: 3.4 MG/DL (ref 2.5–4.5)
PLATELET # BLD AUTO: 250 10*3/MM3 (ref 140–450)
PMV BLD AUTO: 9.5 FL (ref 6–12)
PO2 BLD: 203 MM[HG] (ref 0–500)
PO2 BLDA: 56.8 MM HG (ref 80–100)
PO2 BLDA: 61.3 MM HG (ref 80–100)
POTASSIUM SERPL-SCNC: 4.1 MMOL/L (ref 3.5–5.2)
PROT UR QL STRIP: NEGATIVE
RBC # BLD AUTO: 4.38 10*6/MM3 (ref 4.14–5.8)
READ BACK: NO
READ BACK: YES
SAO2 % BLDCOA: 88.5 % (ref 95–99)
SAO2 % BLDCOA: 88.8 % (ref 95–99)
SODIUM SERPL-SCNC: 137 MMOL/L (ref 136–145)
SP GR UR STRIP: 1.01 (ref 1–1.03)
UROBILINOGEN UR QL STRIP: NORMAL
WBC NRBC COR # BLD AUTO: 10.26 10*3/MM3 (ref 3.4–10.8)

## 2025-02-26 PROCEDURE — 36600 WITHDRAWAL OF ARTERIAL BLOOD: CPT

## 2025-02-26 PROCEDURE — 94799 UNLISTED PULMONARY SVC/PX: CPT

## 2025-02-26 PROCEDURE — 94761 N-INVAS EAR/PLS OXIMETRY MLT: CPT

## 2025-02-26 PROCEDURE — 83735 ASSAY OF MAGNESIUM: CPT | Performed by: STUDENT IN AN ORGANIZED HEALTH CARE EDUCATION/TRAINING PROGRAM

## 2025-02-26 PROCEDURE — 80048 BASIC METABOLIC PNL TOTAL CA: CPT | Performed by: STUDENT IN AN ORGANIZED HEALTH CARE EDUCATION/TRAINING PROGRAM

## 2025-02-26 PROCEDURE — 94664 DEMO&/EVAL PT USE INHALER: CPT

## 2025-02-26 PROCEDURE — 85025 COMPLETE CBC W/AUTO DIFF WBC: CPT | Performed by: STUDENT IN AN ORGANIZED HEALTH CARE EDUCATION/TRAINING PROGRAM

## 2025-02-26 PROCEDURE — 25010000002 HALOPERIDOL LACTATE PER 5 MG: Performed by: STUDENT IN AN ORGANIZED HEALTH CARE EDUCATION/TRAINING PROGRAM

## 2025-02-26 PROCEDURE — 63710000001 DEXAMETHASONE PER 0.25 MG: Performed by: STUDENT IN AN ORGANIZED HEALTH CARE EDUCATION/TRAINING PROGRAM

## 2025-02-26 PROCEDURE — 97161 PT EVAL LOW COMPLEX 20 MIN: CPT

## 2025-02-26 PROCEDURE — 99232 SBSQ HOSP IP/OBS MODERATE 35: CPT | Performed by: STUDENT IN AN ORGANIZED HEALTH CARE EDUCATION/TRAINING PROGRAM

## 2025-02-26 PROCEDURE — 82803 BLOOD GASES ANY COMBINATION: CPT

## 2025-02-26 PROCEDURE — 25010000002 CEFTRIAXONE PER 250 MG: Performed by: STUDENT IN AN ORGANIZED HEALTH CARE EDUCATION/TRAINING PROGRAM

## 2025-02-26 PROCEDURE — 81003 URINALYSIS AUTO W/O SCOPE: CPT | Performed by: STUDENT IN AN ORGANIZED HEALTH CARE EDUCATION/TRAINING PROGRAM

## 2025-02-26 PROCEDURE — 84100 ASSAY OF PHOSPHORUS: CPT | Performed by: STUDENT IN AN ORGANIZED HEALTH CARE EDUCATION/TRAINING PROGRAM

## 2025-02-26 RX ORDER — OLANZAPINE 5 MG/1
5 TABLET, ORALLY DISINTEGRATING ORAL ONCE
Status: COMPLETED | OUTPATIENT
Start: 2025-02-26 | End: 2025-02-26

## 2025-02-26 RX ORDER — HALOPERIDOL 5 MG/ML
1 INJECTION INTRAMUSCULAR EVERY 6 HOURS PRN
Status: DISCONTINUED | OUTPATIENT
Start: 2025-02-26 | End: 2025-03-06

## 2025-02-26 RX ORDER — LORAZEPAM 2 MG/ML
1 INJECTION INTRAMUSCULAR EVERY 6 HOURS PRN
Status: DISCONTINUED | OUTPATIENT
Start: 2025-02-26 | End: 2025-03-02

## 2025-02-26 RX ADMIN — GABAPENTIN 800 MG: 400 CAPSULE ORAL at 14:44

## 2025-02-26 RX ADMIN — Medication 10 ML: at 09:33

## 2025-02-26 RX ADMIN — SACUBITRIL AND VALSARTAN 1 TABLET: 49; 51 TABLET, FILM COATED ORAL at 09:29

## 2025-02-26 RX ADMIN — FUROSEMIDE 40 MG: 40 TABLET ORAL at 09:29

## 2025-02-26 RX ADMIN — HYDROCODONE BITARTRATE AND ACETAMINOPHEN 1 TABLET: 10; 325 TABLET ORAL at 02:33

## 2025-02-26 RX ADMIN — CARVEDILOL 3.12 MG: 3.12 TABLET, FILM COATED ORAL at 17:38

## 2025-02-26 RX ADMIN — IPRATROPIUM BROMIDE AND ALBUTEROL SULFATE 3 ML: .5; 3 SOLUTION RESPIRATORY (INHALATION) at 06:36

## 2025-02-26 RX ADMIN — AZITHROMYCIN DIHYDRATE 250 MG: 250 TABLET ORAL at 17:38

## 2025-02-26 RX ADMIN — OLANZAPINE 5 MG: 5 TABLET, ORALLY DISINTEGRATING ORAL at 17:38

## 2025-02-26 RX ADMIN — SODIUM CHLORIDE 1000 MG: 9 INJECTION INTRAMUSCULAR; INTRAVENOUS; SUBCUTANEOUS at 17:39

## 2025-02-26 RX ADMIN — DEXAMETHASONE 6 MG: 4 TABLET ORAL at 09:29

## 2025-02-26 RX ADMIN — HYDROCODONE BITARTRATE AND ACETAMINOPHEN 1 TABLET: 10; 325 TABLET ORAL at 15:37

## 2025-02-26 RX ADMIN — HALOPERIDOL LACTATE 1 MG: 5 INJECTION, SOLUTION INTRAMUSCULAR at 21:27

## 2025-02-26 RX ADMIN — APIXABAN 5 MG: 5 TABLET, FILM COATED ORAL at 09:29

## 2025-02-26 RX ADMIN — CARVEDILOL 3.12 MG: 3.12 TABLET, FILM COATED ORAL at 09:29

## 2025-02-26 RX ADMIN — PANTOPRAZOLE SODIUM 40 MG: 40 TABLET, DELAYED RELEASE ORAL at 09:29

## 2025-02-26 RX ADMIN — Medication 250 MG: at 09:29

## 2025-02-26 RX ADMIN — AMIODARONE HYDROCHLORIDE 200 MG: 200 TABLET ORAL at 09:29

## 2025-02-26 RX ADMIN — ARFORMOTEROL TARTRATE 15 MCG: 15 SOLUTION RESPIRATORY (INHALATION) at 06:36

## 2025-02-26 RX ADMIN — GUAIFENESIN 600 MG: 600 TABLET ORAL at 09:29

## 2025-02-26 RX ADMIN — BUDESONIDE 0.5 MG: 0.5 INHALANT RESPIRATORY (INHALATION) at 06:36

## 2025-02-26 RX ADMIN — CLOPIDOGREL BISULFATE 75 MG: 75 TABLET ORAL at 09:29

## 2025-02-26 NOTE — PROGRESS NOTES
[facility]   Hospitalist Progress Note  Date: 2025  Patient Name: Sarah Osorio  : 1955  MRN: [id number]  Date of admission: 2025  Room/Bed: [facility]      Subjective   Subjective     Chief Complaint: Weakness, fall    Summary:Sarah Osorio is a 70 y.o. male with past medical history of hypertension, hyperlipidemia, diastolic CHF, A-fib on Eliquis, chronic back pain following injury, morbid obesity and GERD presented to the ED for evaluation of generalized weakness and fall.  Patient said that he has been feeling rather weak with dyspnea on exertion the last few days and around 4 AM this morning patient went to the bathroom and fell to the floor and was so weak that he could not get back up. He crawled back to his bedroom and stayed on the floor for hours until wife decided to call EMS to bring him to the ED for further evaluation.  In the ED patient was hypoxic on arrival requiring oxygen supplementation via nasal cannula with remaining vitals being within normal limits.  Labs showed leukocytosis, electrolyte imbalances, elevated proBNP and elevated but flat troponin.  Remaining labs being relatively unremarkable including negative COVID flu RSV.  CT of the chest is negative for any PE but did show some right-sided bronchopneumonia.  When seen patient was complaining of severe lower back pain but when asked he denied any recent fevers, headaches, focal weakness, chest pain, palpitation, abdominal pain, nausea, vomiting, diarrhea, constipation, dysuria, hematuria, hematochezia, melena, or anxiety.  Patient admitted for further evaluation and treatment.  Respiratory status improving.  Requiring oxygen.  Obtaining transthoracic echo to look for dilatation of ascending aorta seen on CT scan measuring 5.2 cm (seems like he had 4.7-4.9 cm aortic aorta aneurysm which has been stable in the past).  Blood culture and respiratory culture pending.     Interval Followup: No acute overnight events.  No acute  distress.  Patient resting comfortably in bed.  Reports that he is feeling okay today.  He answers orientation questions correctly this morning, however, family at bedside reports that he had a bad night and had confusion.  He apparently was very hard to awake during the night.    Objective   Objective     Vitals:   Temp:  [97.8 °F (36.6 °C)-99.1 °F (37.3 °C)] 97.8 °F (36.6 °C)  Heart Rate:  [54-72] 57  Resp:  [16-20] 20  BP: (110-136)/(52-74) 136/58  Flow (L/min) (Oxygen Therapy):  [2-4] 2    Physical Exam   Gen: NAD, Alert and Oriented  Cards: RRR  Pulm: Nasal cannula in place, diffuse wheezing  Abd: soft, nondistended  Extremities: no pitting edema    Result Review    Result Review:  I have personally reviewed these results:  [x]  Laboratory      Lab 02/26/25  0650 02/25/25  0533 02/24/25  0540 02/23/25  0554 02/22/25  1915   WBC 10.26 17.55* 19.56*   < >  --    HEMOGLOBIN 13.5 13.2 14.4   < >  --    HEMATOCRIT 43.8 44.3 47.2   < >  --    PLATELETS 250 273 319   < >  --    NEUTROS ABS 8.90* 15.54* 15.67*  --   --    IMMATURE GRANS (ABS) 0.13* 0.12* 0.06*  --   --    LYMPHS ABS 0.71 0.79 0.81  --   --    MONOS ABS 0.49 1.06* 1.74*  --   --    EOS ABS 0.00 0.00 1.23*  --   --    .0* 100.7* 99.4*   < >  --    LACTATE  --   --   --   --  1.0    < > = values in this interval not displayed.         Lab 02/26/25  0650 02/25/25  0534 02/24/25  0540   SODIUM 137 141 138   POTASSIUM 4.1 3.9 3.3*   CHLORIDE 93* 96* 93*   CO2 38.6* 38.0* 35.0*   ANION GAP 5.4 7.0 10.0   BUN 19 20 23   CREATININE 0.83 0.95 1.09   EGFR 93.6 85.6 72.6   GLUCOSE 136* 137* 128*   CALCIUM 8.9 9.0 9.4   MAGNESIUM 2.2 2.4 2.6*   PHOSPHORUS 3.4 2.8 3.3         Lab 02/22/25  1500   TOTAL PROTEIN 6.8   ALBUMIN 3.3*   GLOBULIN 3.5   ALT (SGPT) 8   AST (SGOT) 31   BILIRUBIN 0.9   ALK PHOS 145*         Lab 02/22/25  1606 02/22/25  1500   PROBNP  --  1,209.0*   HSTROP T 24* 26*                 Brief Urine Lab Results  (Last result in the past 365  days)        Color   Clarity   Blood   Leuk Est   Nitrite   Protein   CREAT   Urine HCG        02/26/25 1328 Yellow   Clear   Negative   Negative   Negative   Negative                 [x]  Microbiology   Microbiology Results (last 10 days)       Procedure Component Value - Date/Time    S. Pneumo Ag Urine or CSF - Urine, Urine, Clean Catch [[id number]]  (Normal) Collected: 02/25/25 1630    Lab Status: Final result Specimen: Urine, Clean Catch Updated: 02/25/25 1726     Strep Pneumo Ag Negative    Legionella Antigen, Urine - Urine, Urine, Clean Catch [[id number]]  (Normal) Collected: 02/25/25 1630    Lab Status: Final result Specimen: Urine, Clean Catch Updated: 02/25/25 1726     LEGIONELLA ANTIGEN, URINE Negative    Respiratory Panel PCR w/COVID-19(SARS-CoV-2) [facility]EN/TYRELL/ONEAL/PAD/COR/AMRITA In-House, NP Swab in UTM/VTM, 2 HR TAT - Swab, Nasopharynx [[id number]]  (Abnormal) Collected: 02/25/25 0919    Lab Status: Final result Specimen: Swab from Nasopharynx Updated: 02/25/25 1119     ADENOVIRUS, PCR Not Detected     Coronavirus 229E Not Detected     Coronavirus HKU1 Not Detected     Coronavirus NL63 Detected     Coronavirus OC43 Not Detected     COVID19 Detected     Human Metapneumovirus Not Detected     Human Rhinovirus/Enterovirus Not Detected     Influenza A PCR Not Detected     Influenza B PCR Not Detected     Parainfluenza Virus 1 Not Detected     Parainfluenza Virus 2 Not Detected     Parainfluenza Virus 3 Not Detected     Parainfluenza Virus 4 Not Detected     RSV, PCR Not Detected     Bordetella pertussis pcr Not Detected     Bordetella parapertussis PCR Not Detected     Chlamydophila pneumoniae PCR Not Detected     Mycoplasma pneumo by PCR Not Detected    Narrative:      In the setting of a positive respiratory panel with a viral infection PLUS a negative procalcitonin without other underlying concern for bacterial infection, consider observing off antibiotics or discontinuation of antibiotics and continue supportive  care. If the respiratory panel is positive for atypical bacterial infection (Bordetella pertussis, Chlamydophila pneumoniae, or Mycoplasma pneumoniae), consider antibiotic de-escalation to target atypical bacterial infection.    Respiratory Culture - Sputum, Lung, Left Lower Lobe [[id number]] Collected: 02/22/25 2041    Lab Status: Final result Specimen: Sputum from Lung, Left Lower Lobe Updated: 02/25/25 1007     Respiratory Culture Moderate growth (3+) Normal respiratory makenzie. No S. aureus or Pseudomonas aeruginosa detected. Final report.     Gram Stain Many (4+) WBCs seen      Rare (1+) Epithelial cells seen    Blood Culture - Blood, Arm, Right [[id number]]  (Normal) Collected: 02/22/25 1915    Lab Status: Preliminary result Specimen: Blood from Arm, Right Updated: 02/25/25 1931     Blood Culture No growth at 3 days    Narrative:      Less than seven (7) mL's of blood was collected.  Insufficient quantity may yield false negative results.    Blood Culture - Blood, Hand, Left [[id number]]  (Normal) Collected: 02/22/25 1910    Lab Status: Preliminary result Specimen: Blood from Hand, Left Updated: 02/25/25 1931     Blood Culture No growth at 3 days    Narrative:      Less than seven (7) mL's of blood was collected.  Insufficient quantity may yield false negative results.    COVID PRE-OP / PRE-PROCEDURE SCREENING ORDER (NO ISOLATION) - Swab, Nasopharynx [[id number]]  (Normal) Collected: 02/22/25 1513    Lab Status: Final result Specimen: Swab from Nasopharynx Updated: 02/22/25 1600    Narrative:      The following orders were created for panel order COVID PRE-OP / PRE-PROCEDURE SCREENING ORDER (NO ISOLATION) - Swab, Nasopharynx.  Procedure                               Abnormality         Status                     ---------                               -----------         ------                     COVID-19, FLU A/B, RSV P...[[id number]]  Normal              Final result                 Please view results for these  tests on the individual orders.    COVID-19, FLU A/B, RSV PCR 1 HR TAT - Swab, Nasopharynx [[id number]]  (Normal) Collected: 02/22/25 1513    Lab Status: Final result Specimen: Swab from Nasopharynx Updated: 02/22/25 1600     COVID19 Not Detected     Influenza A PCR Not Detected     Influenza B PCR Not Detected     RSV, PCR Not Detected    Narrative:      Fact sheet for providers: https://www.fda.gov/media/694568/download    Fact sheet for patients: https://www.fda.gov/media/890169/download    Test performed by PCR.          [x]  Radiology  XR Chest 1 View    Result Date: 2/25/2025  Impression: 1. Low lung volumes with bilateral perihilar airspace opacities likely representing combination of atelectasis and bronchopneumonia. Electronically Signed: Georges Packer  2/25/2025 3:50 PM EST  Workstation ID: [id number]    CT Chest With Contrast Diagnostic    Addendum Date: 2/22/2025    ADDENDUM:  Postoperative changes involve the cervical spine and are partially imaged on the study. There are degenerative changes throughout the imaged spine.   Portions of this note were completed with a voice recognition program.  2/22/2025 9:42 PM by Sampson Giordano MD on Workstation: [employer]      Result Date: 2/22/2025  1.  Grossly, no proximal (or central) no pulmonary embolism. 2.  There is fusiform dilatation of the ascending aorta, which measures about 5.2 cm greatest diameter. No thoracic aortic dissection is seen. 3.  Right-sided bronchopneumonia is suggested. 4.  Please see above comments for further detail.   Portions of this note were completed with a voice recognition program.  2/22/2025 9:39 PM by Sampson Giordano MD on Workstation: [employer]      CT Head Without Contrast    Result Date: 2/22/2025  Impression: No acute intracranial pathology. Electronically Signed: Sampson Brooks MD  2/22/2025 4:01 PM EST  Workstation ID: [id number]    XR Chest 1 View    Result Date: 2/22/2025  Impression: Hazy increased density over the right hemithorax  may represent mild airspace disease. No dense consolidation or mass is seen. Electronically Signed: Alireza Flor MD  2/22/2025 3:13 PM EST  Workstation ID: [id number]   []  EKG/Telemetry   []  Cardiology/Vascular   []  Pathology  []  Old records  []  Other:    Assessment & Plan   Assessment / Plan     Assessment:  Acute hypoxic respiratory failure  Right side pneumonia, unknown bacterial origin  COVID-19 positive  Generalized weakness secondary to above  HFpEF, not in acute exacerbation  Hyperlipidemia  Hypertension  A-fib on Eliquis  Leukocytosis  Sundowning/hospital related delirium  Ascending AAA, 5.2 cm    Plan:  Continue inpatient admission  Turned O2 down to 2 L while was in the room, he maintain saturations around 95%. Continue to wean as tolerated  Continue IV Rocephin and azithromycin  Respiratory culture normal makenzie.  Strep pneumo and Legionella ordered.  Respiratory PCR positive for COVID-19 and coronavirus strain.  Precautions ordered.  Continue Decadron 6 mg daily for total 10 days.  Brovana, Pulmicort, DuoNebs  Incentive spirometer, Acapella, chest vest  Urinalysis pending  Leukocytosis improving  Echo: EF 61%  Continue PO Lasix 40 mg twice daily  Continue Eliquis 5 mg twice daily  Suspect patient has sundowning/hospital related delirium as it does seem to be waxing and waning.  However, family reported some slurred speech and right sided weakness overnight, this is all resolved this morning on exam.  Will go ahead and get a CT head.  Given level of somnolence reported overnight, may need ABG this happens again.    CT scan reviewed.  Right sided bronchopneumonia.  AAA 5.2 cm -will need vascular follow-up.  PT/OT  A.m. labs       Discussed with RN.    VTE Prophylaxis:  Pharmacologic VTE prophylaxis orders are present.        CODE STATUS:   Level Of Support Discussed With: Patient  Code Status (Patient has no pulse and is not breathing): CPR (Attempt to Resuscitate)  Medical Interventions (Patient has  pulse or is breathing): Full Support      Electronically signed by Anahy Subramanian DO, 2/26/2025, 14:22 EST.

## 2025-02-26 NOTE — PLAN OF CARE
Goal Outcome Evaluation:  Plan of Care Reviewed With: patient        Progress: no change     Unchanged from previous assessment. Pt transferred from recliner to bed with 2 person assist and walker - tolerated activity well. Bed change, hygiene assistance, and gown change performed. Pain medication x 2 this shift. No s/s of distress.

## 2025-02-26 NOTE — THERAPY EVALUATION
Acute Care - Physical Therapy Initial Evaluation   [place]     Patient Name: Sarah Osorio  : 1955  MRN: [id number]  Today's Date: 2025      Visit Dx:     ICD-10-CM ICD-9-CM   1. Fall, initial encounter  W19.XXXA E888.9   2. Weakness  R53.1 780.79   3. Pneumonia due to infectious organism, unspecified laterality, unspecified part of lung  J18.9 486   4. Congestive heart failure, unspecified HF chronicity, unspecified heart failure type  I50.9 428.0   5. Acute respiratory failure with hypoxia  J96.01 518.81   6. Difficulty in walking  R26.2 719.7     Patient Active Problem List   Diagnosis    Seizure    Encephalopathy, toxic    Acute on chronic respiratory failure with hypoxia    Weakness    Fall    Pneumonia due to infectious organism    Congestive heart failure    HTN (hypertension)     Past Medical History:   Diagnosis Date    Abdominal aneurysm     Atrial fib/flutter, transient     CHF (congestive heart failure)     Hyperlipidemia     Hypertension     Injury of back      Past Surgical History:   Procedure Laterality Date    APPENDECTOMY      CARDIAC CATHETERIZATION      HERNIA REPAIR       PT Assessment (Last 12 Hours)       PT Evaluation and Treatment       Row Name 25 1100          Physical Therapy Time and Intention    Subjective Information complains of;weakness;fatigue;dizziness (P)   -TM     Document Type evaluation (P)   -TM     Mode of Treatment individual therapy (P)   -TM     Patient Effort adequate (P)   -TM     Symptoms Noted During/After Treatment fatigue;dizziness (P)   -TM       Row Name 25 1100          General Information    Prior Level of Function min assist:;all household mobility;bed mobility (P)   -TM     Equipment Currently Used at Home walker, rolling (P)   -TM     Existing Precautions/Restrictions fall;other (see comments) (P)   tendency to suddenly lose balance backwards while sitting EOB  -TM       Row Name 25 1100          Living Environment    Current  Living Arrangements home (P)   -TM     People in Home spouse (P)   -     Primary Care Provided by self (P)   -       Row Name 02/26/25 1100          Home Use of Assistive/Adaptive Equipment    Equipment Currently Used at Home walker, rolling (P)   -       Row Name 02/26/25 1100          Range of Motion (ROM)    Range of Motion ROM is WNL;bilateral lower extremities (P)   -       Row Name 02/26/25 1100          Strength (Manual Muscle Testing)    Strength (Manual Muscle Testing) bilateral lower extremities (P)   bilateral global LEs 2/5  -       Row Name 02/26/25 1100          Bed Mobility    Bed Mobility supine-sit (P)   -     Supine-Sit [place] (Bed Mobility) maximum assist (25% patient effort);1 person assist (P)   -       Row Name 02/26/25 1100          Transfers    Transfers other (see comments) (P)   unable to due to [id number] weakness and confusion  -       Row Name 02/26/25 1100          Gait/Stairs (Locomotion)    Patient was able to Ambulate no, other medical factors prevent ambulation (P)   -     Reason Patient was unable to Ambulate Excessive Weakness;Other (Comment) (P)   confusion  -       Row Name 02/26/25 1100          Safety Issues/Impairments Affecting Functional Mobility    Impairments Affecting Function (Mobility) balance;cognition;endurance/activity tolerance;coordination;motor planning;motor control;strength (P)   -       Row Name 02/26/25 1100          Balance    Balance Assessment sitting static balance (P)   -     Static Sitting Balance minimal assist (P)   -     Position, Sitting Balance sitting edge of bed (P)   -       Row Name 02/26/25 1100          Plan of Care Review    Plan of Care Reviewed With patient (P)   -     Outcome Evaluation Pt presents with excessive weakness and has balance and endurance deficits and is unable to stand and ambulate safely. Pt requires skilled therapy services (P)   -       Row Name 02/26/25 1100          Therapy  Assessment/Plan (PT)    Patient/Family Therapy Goals Statement (PT) Reduce assistance required (P)   -TM     Rehab Potential (PT) good (P)   -TM     Criteria for Skilled Interventions Met (PT) skilled treatment is necessary (P)   -TM     Therapy Frequency (PT) daily (P)   -TM     Predicted Duration of Therapy Intervention (PT) 10 days (P)   -TM     Problem List (PT) problems related to;balance;cognition;coordination;mobility;motor control;strength (P)   -TM     Activity Limitations Related to Problem List (PT) unable to ambulate safely (P)   -TM       Row Name 02/26/25 1100          PT Evaluation Complexity    History, PT Evaluation Complexity no personal factors and/or comorbidities (P)   -TM     Examination of Body Systems (PT Eval Complexity) total of 4 or more elements (P)   -TM     Clinical Presentation (PT Evaluation Complexity) stable (P)   -TM     Clinical Decision Making (PT Evaluation Complexity) low complexity (P)   -TM     Overall Complexity (PT Evaluation Complexity) low complexity (P)   -TM       Row Name 02/26/25 1100          Therapy Plan Review/Discharge Plan (PT)    Therapy Plan Review (PT) evaluation/treatment results reviewed;patient (P)   -TM       Row Name 02/26/25 1100          Physical Therapy Goals    Bed Mobility Goal Selection (PT) bed mobility, PT goal 1 (P)   -TM     Transfer Goal Selection (PT) transfer, PT goal 1 (P)   -TM     Gait Training Goal Selection (PT) gait training, PT goal 1 (P)   -TM     Strength Goal Selection (PT) strength, PT goal 1;strength, PT goal 2;strength, PT goal 3 (P)   -TM     Balance Goal Selection (PT) balance, PT goal 1 (P)   -       Row Name 02/26/25 1100          Bed Mobility Goal 1 (PT)    Activity/Assistive Device (Bed Mobility Goal 1, PT) sit to supine/supine to sit (P)   -TM     [place] Level/Cues Needed (Bed Mobility Goal 1, PT) contact guard required (P)   -TM     Time Frame (Bed Mobility Goal 1, PT) 10 days (P)   -       Row Name 02/26/25  1100          Transfer Goal 1 (PT)    Activity/Assistive Device (Transfer Goal 1, PT) transfers, all (P)   -TM     [place] Level/Cues Needed (Transfer Goal 1, PT) contact guard required (P)   -TM     Time Frame (Transfer Goal 1, PT) 10 days (P)   -TM       Row Name 02/26/25 1100          Gait Training Goal 1 (PT)    Activity/Assistive Device (Gait Training Goal 1, PT) gait (walking locomotion);assistive device use (P)   -TM     [place] Level (Gait Training Goal 1, PT) minimum assist (75% or more patient effort) (P)   -TM     Distance (Gait Training Goal 1, PT) 30' (P)   -TM     Time Frame (Gait Training Goal 1, PT) 10 days (P)   -TM       Row Name 02/26/25 1100          Strength Goal 1 (PT)    Strength Goal 1 (PT) bilateral hip flexion 4/5 (P)   -TM     Time Frame (Strength Goal 1, PT) 10 days (P)   -TM       Row Name 02/26/25 1100          Strength Goal 2 (PT)    Strength Goal 2 (PT) bilateral knee extension 4/5 (P)   -TM     Time Frame (Strength Goal 2, PT) 10 days (P)   -TM       Row Name 02/26/25 1100          Strength Goal 3 (PT)    Strength Goal 3 (PT) bilateral ankle dorsiflexion 4/5 (P)   -TM     Time Frame (Strength Goal 3, PT) 10 days (P)   -TM       Row Name 02/26/25 1100          Balance Goal 1 (PT)    Activity/Assistive Device (Balance Goal) sitting static balance (P)   -TM     [place] Level/Cues Needed (Balance Goal 1, PT) independent (P)   -TM     Time Frame (Balance Goal 1, PT) 2 weeks (P)   -TM               User Key  (r) = Recorded By, (t) = Taken By, (c) = Cosigned By      Initials Name Provider Type    TM Ernie Bernardo PT Student PT Student                    Physical Therapy Education       Title: PT OT SLP Therapies (In Progress)       Topic: Physical Therapy (Done)       Point: Mobility training (Done)       Learning Progress Summary            Patient Acceptance, E,TB, VU by TM at 2/26/2025 2152                      Point: Home exercise program (Done)       Learning Progress  Summary            Patient Acceptance, E,TB, VU by TM at 2/26/2025 1133                      Point: Body mechanics (Done)       Learning Progress Summary            Patient Acceptance, E,TB, VU by TM at 2/26/2025 1133                      Point: Precautions (Done)       Learning Progress Summary            Patient Acceptance, E,TB, VU by TM at 2/26/2025 1133                                      User Key       Initials Effective Dates Name Provider Type Discipline     02/04/25 -  Ernie Bernardo, PT Student PT Student PT                  PT Recommendation and Plan  Anticipated Discharge Disposition (PT): (P) inpatient rehabilitation facility  Planned Therapy Interventions (PT): (P) balance training, bed mobility training, gait training, home exercise program, neuromuscular re-education, strengthening, stretching, transfer training  Therapy Frequency (PT): (P) daily  Plan of Care Reviewed With: (P) patient  Outcome Evaluation: (P) Pt presents with excessive weakness and has balance and endurance deficits and is unable to stand and ambulate safely. Pt requires skilled therapy services   Outcome Measures       Row Name 02/26/25 1100             How much help from another person do you currently need...    Turning from your back to your side while in flat bed without using bedrails? 2 (P)   -TM      Moving from lying on back to sitting on the side of a flat bed without bedrails? 1 (P)   -TM      Moving to and from a bed to a chair (including a wheelchair)? 1 (P)   -TM      Standing up from a chair using your arms (e.g., wheelchair, bedside chair)? 1 (P)   -TM      Climbing 3-5 steps with a railing? 1 (P)   -TM      To walk in hospital room? 1 (P)   -TM      AM-PAC 6 Clicks Score (PT) 7 (P)   -TM         Functional Assessment    Outcome Measure Options AM-PAC 6 Clicks Basic Mobility (PT) (P)   -TM                User Key  (r) = Recorded By, (t) = Taken By, (c) = Cosigned By      Initials Name Provider Type    TM Az  Ernie PT Student PT Student                     Time Calculation:    PT Charges       Row Name 02/26/25 1122             Time Calculation    PT Received On 02/26/25 (P)   -TM      PT Goal Re-Cert Due Date 03/07/25 (P)   -TM         Untimed Charges    PT Eval/Re-eval Minutes 25 (P)   -TM         Total Minutes    Untimed Charges Total Minutes 25 (P)   -TM       Total Minutes 25 (P)   -TM                User Key  (r) = Recorded By, (t) = Taken By, (c) = Cosigned By      Initials Name Provider Type    TM Ernie Bernardo, PT Student PT Student                  Therapy Charges for Today       Code Description Service Date Service Provider Modifiers Qty    [id number] HC PT EVAL LOW COMPLEXITY 2 2/26/2025 Ernie Bernardo, PT Student GP 1            PT G-Codes  Outcome Measure Options: (P) AM-PAC 6 Clicks Basic Mobility (PT)  AM-PAC 6 Clicks Score (PT): (P) 7    Ernie Bernardo PT Student  2/26/2025

## 2025-02-26 NOTE — PLAN OF CARE
Goal Outcome Evaluation:  Plan of Care Reviewed With: patient           Outcome Evaluation: Patient wearing 3L nasal cannula, humidified. Bipap is at bedside, off. Patient did not wear Bipap overnight.

## 2025-02-26 NOTE — PLAN OF CARE
Goal Outcome Evaluation:            Patient currently on v60 unit. Was placed on this afternoon due to elevated CO2. pH compensated. Went to check on patient and patient very angry, refused breathing tx. No further issues or changes and as of right now patient not pulling at mask.

## 2025-02-26 NOTE — PLAN OF CARE
Goal Outcome Evaluation:  Plan of Care Reviewed With: (P) patient           Outcome Evaluation: (P) Pt presents with excessive weakness and has balance and endurance deficits and is unable to stand and ambulate safely. Pt requires skilled therapy services    Anticipated Discharge Disposition (PT): (P) inpatient rehabilitation facility

## 2025-02-26 NOTE — THERAPY EVALUATION
RT EQUIPMENT DEVICE RELATED - SKIN ASSESSMENT    RT Medical Equipment/Device:     NIV Mask:  Under-the-nose   size:  b    Skin Assessment:      Cheek:  Intact  Chin:  Intact  Neck:  Intact  Nose:  Intact  Mouth:  Intact    Device Skin Pressure Protection:  Pressure points protected    Nurse Notification:  Roseanna Cruz, RRT

## 2025-02-26 NOTE — PLAN OF CARE
Goal Outcome Evaluation:   Patient on 3L nc and tolerating well. Patient refused v60 unit for the night. Patient couldn't tolerate yesterday. No issues or changes @ this time.

## 2025-02-27 ENCOUNTER — APPOINTMENT (OUTPATIENT)
Dept: CT IMAGING | Facility: HOSPITAL | Age: 70
DRG: 193 | End: 2025-02-27
Payer: MEDICARE

## 2025-02-27 LAB
ANION GAP SERPL CALCULATED.3IONS-SCNC: 11.4 MMOL/L (ref 5–15)
ARTERIAL PATENCY WRIST A: POSITIVE
ARTERIAL PATENCY WRIST A: POSITIVE
ATMOSPHERIC PRESS: 741.6 MMHG
ATMOSPHERIC PRESS: 741.8 MMHG
BACTERIA SPEC AEROBE CULT: NORMAL
BACTERIA SPEC AEROBE CULT: NORMAL
BASE EXCESS BLDA CALC-SCNC: 16.2 MMOL/L (ref -2–2)
BASE EXCESS BLDA CALC-SCNC: 7.3 MMOL/L (ref -2–2)
BASOPHILS # BLD AUTO: 0.03 10*3/MM3 (ref 0–0.2)
BASOPHILS NFR BLD AUTO: 0.2 % (ref 0–1.5)
BDY SITE: ABNORMAL
BDY SITE: ABNORMAL
BUN SERPL-MCNC: 18 MG/DL (ref 8–23)
BUN/CREAT SERPL: 22 (ref 7–25)
CALCIUM SPEC-SCNC: 9.5 MG/DL (ref 8.6–10.5)
CHLORIDE SERPL-SCNC: 88 MMOL/L (ref 98–107)
CO2 SERPL-SCNC: 36.6 MMOL/L (ref 22–29)
CREAT SERPL-MCNC: 0.82 MG/DL (ref 0.76–1.27)
DEPRECATED RDW RBC AUTO: 44 FL (ref 37–54)
EGFRCR SERPLBLD CKD-EPI 2021: 93.9 ML/MIN/1.73
EOSINOPHIL # BLD AUTO: 0 10*3/MM3 (ref 0–0.4)
EOSINOPHIL NFR BLD AUTO: 0 % (ref 0.3–6.2)
ERYTHROCYTE [DISTWIDTH] IN BLOOD BY AUTOMATED COUNT: 12.4 % (ref 12.3–15.4)
GAS FLOW AIRWAY: 2 LPM
GAS FLOW AIRWAY: 4 LPM
GLUCOSE SERPL-MCNC: 143 MG/DL (ref 65–99)
HCO3 BLDA-SCNC: 34.6 MMOL/L (ref 22–26)
HCO3 BLDA-SCNC: 44.3 MMOL/L (ref 22–26)
HCT VFR BLD AUTO: 47.8 % (ref 37.5–51)
HCT VFR BLD CALC: 48 % (ref 38–51)
HCT VFR BLD CALC: 54 % (ref 38–51)
HEMODILUTION: NO
HEMODILUTION: NO
HGB BLD-MCNC: 15.1 G/DL (ref 13–17.7)
HGB BLDA-MCNC: 16.3 G/DL (ref 12–18)
HGB BLDA-MCNC: 18.5 G/DL (ref 12–18)
IMM GRANULOCYTES # BLD AUTO: 0.14 10*3/MM3 (ref 0–0.05)
IMM GRANULOCYTES NFR BLD AUTO: 1 % (ref 0–0.5)
INHALED O2 CONCENTRATION: 28 %
INHALED O2 CONCENTRATION: 36 %
LYMPHOCYTES # BLD AUTO: 0.73 10*3/MM3 (ref 0.7–3.1)
LYMPHOCYTES NFR BLD AUTO: 5.2 % (ref 19.6–45.3)
MAGNESIUM SERPL-MCNC: 2.1 MG/DL (ref 1.6–2.4)
MCH RBC QN AUTO: 30.4 PG (ref 26.6–33)
MCHC RBC AUTO-ENTMCNC: 31.6 G/DL (ref 31.5–35.7)
MCV RBC AUTO: 96.2 FL (ref 79–97)
MODALITY: ABNORMAL
MODALITY: ABNORMAL
MONOCYTES # BLD AUTO: 0.72 10*3/MM3 (ref 0.1–0.9)
MONOCYTES NFR BLD AUTO: 5.1 % (ref 5–12)
NEUTROPHILS NFR BLD AUTO: 12.38 10*3/MM3 (ref 1.7–7)
NEUTROPHILS NFR BLD AUTO: 88.5 % (ref 42.7–76)
NRBC BLD AUTO-RTO: 0 /100 WBC (ref 0–0.2)
PCO2 BLDA: 57.1 MM HG (ref 35–45)
PCO2 BLDA: 60.6 MM HG (ref 35–45)
PH BLDA: 7.39 PH UNITS (ref 7.35–7.45)
PH BLDA: 7.47 PH UNITS (ref 7.35–7.45)
PHOSPHATE SERPL-MCNC: 2.1 MG/DL (ref 2.5–4.5)
PLATELET # BLD AUTO: 320 10*3/MM3 (ref 140–450)
PMV BLD AUTO: 9.5 FL (ref 6–12)
PO2 BLD: 193 MM[HG] (ref 0–500)
PO2 BLD: 208 MM[HG] (ref 0–500)
PO2 BLDA: 58.3 MM HG (ref 80–100)
PO2 BLDA: 69.5 MM HG (ref 80–100)
POTASSIUM SERPL-SCNC: 3.8 MMOL/L (ref 3.5–5.2)
RBC # BLD AUTO: 4.97 10*6/MM3 (ref 4.14–5.8)
SAO2 % BLDCOA: 88.7 % (ref 95–99)
SAO2 % BLDCOA: 94 % (ref 95–99)
SODIUM SERPL-SCNC: 136 MMOL/L (ref 136–145)
WBC NRBC COR # BLD AUTO: 14 10*3/MM3 (ref 3.4–10.8)

## 2025-02-27 PROCEDURE — 63710000001 DEXAMETHASONE PER 0.25 MG: Performed by: STUDENT IN AN ORGANIZED HEALTH CARE EDUCATION/TRAINING PROGRAM

## 2025-02-27 PROCEDURE — 94799 UNLISTED PULMONARY SVC/PX: CPT

## 2025-02-27 PROCEDURE — 94664 DEMO&/EVAL PT USE INHALER: CPT

## 2025-02-27 PROCEDURE — 36600 WITHDRAWAL OF ARTERIAL BLOOD: CPT | Performed by: STUDENT IN AN ORGANIZED HEALTH CARE EDUCATION/TRAINING PROGRAM

## 2025-02-27 PROCEDURE — 85025 COMPLETE CBC W/AUTO DIFF WBC: CPT | Performed by: STUDENT IN AN ORGANIZED HEALTH CARE EDUCATION/TRAINING PROGRAM

## 2025-02-27 PROCEDURE — 36600 WITHDRAWAL OF ARTERIAL BLOOD: CPT

## 2025-02-27 PROCEDURE — 94761 N-INVAS EAR/PLS OXIMETRY MLT: CPT

## 2025-02-27 PROCEDURE — 82803 BLOOD GASES ANY COMBINATION: CPT | Performed by: STUDENT IN AN ORGANIZED HEALTH CARE EDUCATION/TRAINING PROGRAM

## 2025-02-27 PROCEDURE — 84100 ASSAY OF PHOSPHORUS: CPT | Performed by: STUDENT IN AN ORGANIZED HEALTH CARE EDUCATION/TRAINING PROGRAM

## 2025-02-27 PROCEDURE — 83735 ASSAY OF MAGNESIUM: CPT | Performed by: STUDENT IN AN ORGANIZED HEALTH CARE EDUCATION/TRAINING PROGRAM

## 2025-02-27 PROCEDURE — 70450 CT HEAD/BRAIN W/O DYE: CPT

## 2025-02-27 PROCEDURE — 82803 BLOOD GASES ANY COMBINATION: CPT

## 2025-02-27 PROCEDURE — 80048 BASIC METABOLIC PNL TOTAL CA: CPT | Performed by: STUDENT IN AN ORGANIZED HEALTH CARE EDUCATION/TRAINING PROGRAM

## 2025-02-27 PROCEDURE — 99232 SBSQ HOSP IP/OBS MODERATE 35: CPT | Performed by: STUDENT IN AN ORGANIZED HEALTH CARE EDUCATION/TRAINING PROGRAM

## 2025-02-27 RX ADMIN — ARFORMOTEROL TARTRATE 15 MCG: 15 SOLUTION RESPIRATORY (INHALATION) at 11:20

## 2025-02-27 RX ADMIN — GUAIFENESIN 600 MG: 600 TABLET ORAL at 10:14

## 2025-02-27 RX ADMIN — Medication 10 ML: at 11:20

## 2025-02-27 RX ADMIN — GABAPENTIN 800 MG: 400 CAPSULE ORAL at 14:39

## 2025-02-27 RX ADMIN — CARVEDILOL 3.12 MG: 3.12 TABLET, FILM COATED ORAL at 10:14

## 2025-02-27 RX ADMIN — GUAIFENESIN 600 MG: 600 TABLET ORAL at 21:53

## 2025-02-27 RX ADMIN — APIXABAN 5 MG: 5 TABLET, FILM COATED ORAL at 10:14

## 2025-02-27 RX ADMIN — BISACODYL 5 MG: 5 TABLET, COATED ORAL at 14:39

## 2025-02-27 RX ADMIN — AMIODARONE HYDROCHLORIDE 200 MG: 200 TABLET ORAL at 10:14

## 2025-02-27 RX ADMIN — Medication 10 ML: at 00:42

## 2025-02-27 RX ADMIN — IPRATROPIUM BROMIDE AND ALBUTEROL SULFATE 3 ML: .5; 3 SOLUTION RESPIRATORY (INHALATION) at 11:20

## 2025-02-27 RX ADMIN — CARVEDILOL 3.12 MG: 3.12 TABLET, FILM COATED ORAL at 18:33

## 2025-02-27 RX ADMIN — BUDESONIDE 0.5 MG: 0.5 INHALANT RESPIRATORY (INHALATION) at 11:20

## 2025-02-27 RX ADMIN — BUDESONIDE 0.5 MG: 0.5 INHALANT RESPIRATORY (INHALATION) at 20:56

## 2025-02-27 RX ADMIN — GABAPENTIN 800 MG: 400 CAPSULE ORAL at 21:52

## 2025-02-27 RX ADMIN — Medication 10 ML: at 21:53

## 2025-02-27 RX ADMIN — Medication 250 MG: at 10:14

## 2025-02-27 RX ADMIN — Medication 250 MG: at 21:52

## 2025-02-27 RX ADMIN — FUROSEMIDE 40 MG: 40 TABLET ORAL at 10:14

## 2025-02-27 RX ADMIN — SACUBITRIL AND VALSARTAN 1 TABLET: 49; 51 TABLET, FILM COATED ORAL at 10:14

## 2025-02-27 RX ADMIN — APIXABAN 5 MG: 5 TABLET, FILM COATED ORAL at 21:53

## 2025-02-27 RX ADMIN — SACUBITRIL AND VALSARTAN 1 TABLET: 49; 51 TABLET, FILM COATED ORAL at 21:53

## 2025-02-27 RX ADMIN — ARFORMOTEROL TARTRATE 15 MCG: 15 SOLUTION RESPIRATORY (INHALATION) at 20:56

## 2025-02-27 RX ADMIN — GABAPENTIN 800 MG: 400 CAPSULE ORAL at 06:01

## 2025-02-27 RX ADMIN — CLOPIDOGREL BISULFATE 75 MG: 75 TABLET ORAL at 10:14

## 2025-02-27 RX ADMIN — PANTOPRAZOLE SODIUM 40 MG: 40 TABLET, DELAYED RELEASE ORAL at 10:14

## 2025-02-27 RX ADMIN — DEXAMETHASONE 6 MG: 4 TABLET ORAL at 10:14

## 2025-02-27 RX ADMIN — HYDROCODONE BITARTRATE AND ACETAMINOPHEN 1 TABLET: 10; 325 TABLET ORAL at 21:53

## 2025-02-27 NOTE — PLAN OF CARE
Goal Outcome Evaluation:  Plan of Care Reviewed With: patient        Progress: declining  Outcome Evaluation: alert to self. co2 of 61.8, transfered from 3NT this shift.

## 2025-02-27 NOTE — PLAN OF CARE
..Goal Outcome Evaluation:              Outcome Evaluation: Alert to self and sometimes place. Patient sleepy most of the shift, more alert now. ABG obtained this morning, did not place on bipap. ABGs to be obtained now. Patient on 4L NC. No complaints of pain. Purewick in place. CT of head obtained. Bisacodyl given PRN for constipation.

## 2025-02-27 NOTE — PLAN OF CARE
Goal Outcome Evaluation:  Plan of Care Reviewed With: patient        Progress: declining  Outcome Evaluation: Pt has been getting increasingly more confused throughout shift. Pt did have a critical lab of CO2: 79. MD aware. Pt is on bipap however will not keep it on. MD aware. Pt did receive NORCO for pain. Pt did refuse ativan for increased agitation. Pt has some right sided tremors. MD aware. No other compalints this shift. Pt is alert to self. Pt has not getting up this shift. Call light within reach. Will continue plan of care.

## 2025-02-28 LAB
ANION GAP SERPL CALCULATED.3IONS-SCNC: 7.8 MMOL/L (ref 5–15)
BASOPHILS # BLD AUTO: 0.05 10*3/MM3 (ref 0–0.2)
BASOPHILS NFR BLD AUTO: 0.4 % (ref 0–1.5)
BUN SERPL-MCNC: 22 MG/DL (ref 8–23)
BUN/CREAT SERPL: 26.8 (ref 7–25)
CALCIUM SPEC-SCNC: 8.7 MG/DL (ref 8.6–10.5)
CHLORIDE SERPL-SCNC: 91 MMOL/L (ref 98–107)
CO2 SERPL-SCNC: 36.2 MMOL/L (ref 22–29)
CREAT SERPL-MCNC: 0.82 MG/DL (ref 0.76–1.27)
DEPRECATED RDW RBC AUTO: 43.7 FL (ref 37–54)
EGFRCR SERPLBLD CKD-EPI 2021: 93.9 ML/MIN/1.73
EOSINOPHIL # BLD AUTO: 0 10*3/MM3 (ref 0–0.4)
EOSINOPHIL NFR BLD AUTO: 0 % (ref 0.3–6.2)
ERYTHROCYTE [DISTWIDTH] IN BLOOD BY AUTOMATED COUNT: 12.3 % (ref 12.3–15.4)
GLUCOSE SERPL-MCNC: 153 MG/DL (ref 65–99)
HCT VFR BLD AUTO: 45.5 % (ref 37.5–51)
HGB BLD-MCNC: 14.9 G/DL (ref 13–17.7)
IMM GRANULOCYTES # BLD AUTO: 0.11 10*3/MM3 (ref 0–0.05)
IMM GRANULOCYTES NFR BLD AUTO: 0.9 % (ref 0–0.5)
LYMPHOCYTES # BLD AUTO: 0.74 10*3/MM3 (ref 0.7–3.1)
LYMPHOCYTES NFR BLD AUTO: 5.9 % (ref 19.6–45.3)
MAGNESIUM SERPL-MCNC: 2.2 MG/DL (ref 1.6–2.4)
MCH RBC QN AUTO: 31.4 PG (ref 26.6–33)
MCHC RBC AUTO-ENTMCNC: 32.7 G/DL (ref 31.5–35.7)
MCV RBC AUTO: 95.8 FL (ref 79–97)
MONOCYTES # BLD AUTO: 0.86 10*3/MM3 (ref 0.1–0.9)
MONOCYTES NFR BLD AUTO: 6.8 % (ref 5–12)
NEUTROPHILS NFR BLD AUTO: 10.8 10*3/MM3 (ref 1.7–7)
NEUTROPHILS NFR BLD AUTO: 86 % (ref 42.7–76)
NRBC BLD AUTO-RTO: 0 /100 WBC (ref 0–0.2)
PHOSPHATE SERPL-MCNC: 3.6 MG/DL (ref 2.5–4.5)
PLATELET # BLD AUTO: 307 10*3/MM3 (ref 140–450)
PMV BLD AUTO: 9.3 FL (ref 6–12)
POTASSIUM SERPL-SCNC: 3.7 MMOL/L (ref 3.5–5.2)
RBC # BLD AUTO: 4.75 10*6/MM3 (ref 4.14–5.8)
SODIUM SERPL-SCNC: 135 MMOL/L (ref 136–145)
WBC NRBC COR # BLD AUTO: 12.56 10*3/MM3 (ref 3.4–10.8)

## 2025-02-28 PROCEDURE — 94799 UNLISTED PULMONARY SVC/PX: CPT

## 2025-02-28 PROCEDURE — 83735 ASSAY OF MAGNESIUM: CPT | Performed by: STUDENT IN AN ORGANIZED HEALTH CARE EDUCATION/TRAINING PROGRAM

## 2025-02-28 PROCEDURE — 80048 BASIC METABOLIC PNL TOTAL CA: CPT | Performed by: STUDENT IN AN ORGANIZED HEALTH CARE EDUCATION/TRAINING PROGRAM

## 2025-02-28 PROCEDURE — 85025 COMPLETE CBC W/AUTO DIFF WBC: CPT | Performed by: STUDENT IN AN ORGANIZED HEALTH CARE EDUCATION/TRAINING PROGRAM

## 2025-02-28 PROCEDURE — 97165 OT EVAL LOW COMPLEX 30 MIN: CPT

## 2025-02-28 PROCEDURE — 94761 N-INVAS EAR/PLS OXIMETRY MLT: CPT

## 2025-02-28 PROCEDURE — 99232 SBSQ HOSP IP/OBS MODERATE 35: CPT | Performed by: STUDENT IN AN ORGANIZED HEALTH CARE EDUCATION/TRAINING PROGRAM

## 2025-02-28 PROCEDURE — 84100 ASSAY OF PHOSPHORUS: CPT | Performed by: STUDENT IN AN ORGANIZED HEALTH CARE EDUCATION/TRAINING PROGRAM

## 2025-02-28 PROCEDURE — 94664 DEMO&/EVAL PT USE INHALER: CPT

## 2025-02-28 RX ADMIN — HYDROCODONE BITARTRATE AND ACETAMINOPHEN 1 TABLET: 10; 325 TABLET ORAL at 21:05

## 2025-02-28 RX ADMIN — ARFORMOTEROL TARTRATE 15 MCG: 15 SOLUTION RESPIRATORY (INHALATION) at 20:22

## 2025-02-28 RX ADMIN — Medication 250 MG: at 20:54

## 2025-02-28 RX ADMIN — GUAIFENESIN 600 MG: 600 TABLET ORAL at 20:54

## 2025-02-28 RX ADMIN — SACUBITRIL AND VALSARTAN 1 TABLET: 49; 51 TABLET, FILM COATED ORAL at 09:27

## 2025-02-28 RX ADMIN — AMIODARONE HYDROCHLORIDE 200 MG: 200 TABLET ORAL at 09:27

## 2025-02-28 RX ADMIN — CLOPIDOGREL BISULFATE 75 MG: 75 TABLET ORAL at 09:27

## 2025-02-28 RX ADMIN — APIXABAN 5 MG: 5 TABLET, FILM COATED ORAL at 09:27

## 2025-02-28 RX ADMIN — CARVEDILOL 3.12 MG: 3.12 TABLET, FILM COATED ORAL at 09:26

## 2025-02-28 RX ADMIN — Medication 10 ML: at 09:27

## 2025-02-28 RX ADMIN — HYDROCODONE BITARTRATE AND ACETAMINOPHEN 1 TABLET: 10; 325 TABLET ORAL at 10:26

## 2025-02-28 RX ADMIN — Medication 10 ML: at 20:55

## 2025-02-28 RX ADMIN — BUDESONIDE 0.5 MG: 0.5 INHALANT RESPIRATORY (INHALATION) at 09:52

## 2025-02-28 RX ADMIN — GABAPENTIN 800 MG: 400 CAPSULE ORAL at 05:15

## 2025-02-28 RX ADMIN — CARVEDILOL 3.12 MG: 3.12 TABLET, FILM COATED ORAL at 17:32

## 2025-02-28 RX ADMIN — BUDESONIDE 0.5 MG: 0.5 INHALANT RESPIRATORY (INHALATION) at 20:22

## 2025-02-28 RX ADMIN — GABAPENTIN 800 MG: 400 CAPSULE ORAL at 14:51

## 2025-02-28 RX ADMIN — APIXABAN 5 MG: 5 TABLET, FILM COATED ORAL at 20:54

## 2025-02-28 RX ADMIN — IPRATROPIUM BROMIDE AND ALBUTEROL SULFATE 3 ML: .5; 3 SOLUTION RESPIRATORY (INHALATION) at 09:52

## 2025-02-28 RX ADMIN — HYDROCODONE BITARTRATE AND ACETAMINOPHEN 1 TABLET: 10; 325 TABLET ORAL at 03:49

## 2025-02-28 RX ADMIN — ARFORMOTEROL TARTRATE 15 MCG: 15 SOLUTION RESPIRATORY (INHALATION) at 09:52

## 2025-02-28 RX ADMIN — GUAIFENESIN 600 MG: 600 TABLET ORAL at 09:27

## 2025-02-28 RX ADMIN — GABAPENTIN 800 MG: 400 CAPSULE ORAL at 21:05

## 2025-02-28 RX ADMIN — PANTOPRAZOLE SODIUM 40 MG: 40 TABLET, DELAYED RELEASE ORAL at 09:26

## 2025-02-28 RX ADMIN — Medication 250 MG: at 09:26

## 2025-02-28 RX ADMIN — SACUBITRIL AND VALSARTAN 1 TABLET: 49; 51 TABLET, FILM COATED ORAL at 20:54

## 2025-02-28 NOTE — PLAN OF CARE
Goal Outcome Evaluation:              Outcome Evaluation: Patient alert and able to make needs known. PRN medication given for complaints of pain. Complete bed change. Continues on isolation precautions. Frequently used items with in easy reach.

## 2025-02-28 NOTE — PROGRESS NOTES
RT EQUIPMENT DEVICE RELATED - SKIN ASSESSMENT    RT Medical Equipment/Device:     NIV Mask:  Under-the-nose   size:  b    Skin Assessment:      Cheek:  Intact  Chin:  Intact  Nares:  Intact  Nose:  Intact  Lips:  Intact  Mouth:  Intact    Device Skin Pressure Protection:  Pressure points protected    Nurse Notification:  Roseanna Cruz, RRT

## 2025-02-28 NOTE — PLAN OF CARE
Goal Outcome Evaluation:  Plan of Care Reviewed With: patient        Progress: declining  Outcome Evaluation: aox4 this shift. reeducated on use of call light. on 4 L NC.

## 2025-02-28 NOTE — PLAN OF CARE
Goal Outcome Evaluation:  Plan of Care Reviewed With: patient        Progress: no change (first session for evaluation)  Outcome Evaluation: Patient presents with limitations of endurance/activity tolerance, balance and cognition which are impacting ADL/ transfer independence. Skilled OT services are indicated to remediate/ compensate for deficits to maximize independence and safety with functional ADL tasks.    Anticipated Discharge Disposition (OT): inpatient rehabilitation facility

## 2025-02-28 NOTE — PROGRESS NOTES
[facility]   Hospitalist Progress Note  Date: 2025  Patient Name: Sarah Osorio  : 1955  MRN: [id number]  Date of admission: 2025  Room/Bed: [facility]      Subjective   Subjective     Chief Complaint: Weakness, fall    Summary:Sarah Osorio is a 70 y.o. male with past medical history of hypertension, hyperlipidemia, diastolic CHF, A-fib on Eliquis, chronic back pain following injury, morbid obesity and GERD presented to the ED for evaluation of generalized weakness and fall.  Patient said that he has been feeling rather weak with dyspnea on exertion the last few days and around 4 AM this morning patient went to the bathroom and fell to the floor and was so weak that he could not get back up. He crawled back to his bedroom and stayed on the floor for hours until wife decided to call EMS to bring him to the ED for further evaluation.  In the ED patient was hypoxic on arrival requiring oxygen supplementation via nasal cannula with remaining vitals being within normal limits.  Labs showed leukocytosis, electrolyte imbalances, elevated proBNP and elevated but flat troponin.  Remaining labs being relatively unremarkable including negative COVID flu RSV.  CT of the chest is negative for any PE but did show some right-sided bronchopneumonia.  When seen patient was complaining of severe lower back pain but when asked he denied any recent fevers, headaches, focal weakness, chest pain, palpitation, abdominal pain, nausea, vomiting, diarrhea, constipation, dysuria, hematuria, hematochezia, melena, or anxiety.  Patient admitted for further evaluation and treatment.  Respiratory status improving.  Requiring oxygen.  Obtaining transthoracic echo to look for dilatation of ascending aorta seen on CT scan measuring 5.2 cm (seems like he had 4.7-4.9 cm aortic aorta aneurysm which has been stable in the past).  Blood culture and respiratory culture pending.     Interval Followup: Improved mental status overnight and  this morning.  Wife reports that he is closer to his baseline now.  He denies any chest pain or shortness of breath.  Feels good.  Encouraged him to work with physical therapy and get moving today.    Objective   Objective     Vitals:   Temp:  [97.6 °F (36.4 °C)-98.7 °F (37.1 °C)] 98.2 °F (36.8 °C)  Heart Rate:  [60-96] 67  Resp:  [20-24] 20  BP: (132-158)/() 132/82  Flow (L/min) (Oxygen Therapy):  [4] 4    Physical Exam   Gen: NAD, Alert and Oriented  Cards: RRR  Pulm: Nasal cannula in place, wheezing resolved  Abd: soft, nondistended  Extremities: no pitting edema    Result Review    Result Review:  I have personally reviewed these results:  [x]  Laboratory      Lab 02/28/25 0424 02/27/25 0417 02/26/25  0650 02/23/25  0554 02/22/25  1915   WBC 12.56* 14.00* 10.26   < >  --    HEMOGLOBIN 14.9 15.1 13.5   < >  --    HEMATOCRIT 45.5 47.8 43.8   < >  --    PLATELETS 307 320 250   < >  --    NEUTROS ABS 10.80* 12.38* 8.90*   < >  --    IMMATURE GRANS (ABS) 0.11* 0.14* 0.13*   < >  --    LYMPHS ABS 0.74 0.73 0.71   < >  --    MONOS ABS 0.86 0.72 0.49   < >  --    EOS ABS 0.00 0.00 0.00   < >  --    MCV 95.8 96.2 100.0*   < >  --    LACTATE  --   --   --   --  1.0    < > = values in this interval not displayed.         Lab 02/28/25 0424 02/27/25 0417 02/26/25  0650   SODIUM 135* 136 137   POTASSIUM 3.7 3.8 4.1   CHLORIDE 91* 88* 93*   CO2 36.2* 36.6* 38.6*   ANION GAP 7.8 11.4 5.4   BUN 22 18 19   CREATININE 0.82 0.82 0.83   EGFR 93.9 93.9 93.6   GLUCOSE 153* 143* 136*   CALCIUM 8.7 9.5 8.9   MAGNESIUM 2.2 2.1 2.2   PHOSPHORUS 3.6 2.1* 3.4         Lab 02/22/25  1500   TOTAL PROTEIN 6.8   ALBUMIN 3.3*   GLOBULIN 3.5   ALT (SGPT) 8   AST (SGOT) 31   BILIRUBIN 0.9   ALK PHOS 145*         Lab 02/22/25  1606 02/22/25  1500   PROBNP  --  1,209.0*   HSTROP T 24* 26*                 Lab 02/27/25  2053 02/27/25  1028 02/26/25  2341   PH, ARTERIAL 7.471* 7.390 7.420   PCO2, ARTERIAL 60.6* 57.1* 61.8*   PO2 ART 69.5*  58.3* 56.8*   O2 SATURATION ART 94.0* 88.7* 88.5*   FIO2 36 28 28   HCO3 ART 44.3* 34.6* 40.1*   BASE EXCESS ART 16.2* 7.3* 12.3*     Brief Urine Lab Results  (Last result in the past 365 days)        Color   Clarity   Blood   Leuk Est   Nitrite   Protein   CREAT   Urine HCG        02/26/25 1328 Yellow   Clear   Negative   Negative   Negative   Negative                 [x]  Microbiology   Microbiology Results (last 10 days)       Procedure Component Value - Date/Time    S. Pneumo Ag Urine or CSF - Urine, Urine, Clean Catch [[id number]]  (Normal) Collected: 02/25/25 1630    Lab Status: Final result Specimen: Urine, Clean Catch Updated: 02/25/25 1726     Strep Pneumo Ag Negative    Legionella Antigen, Urine - Urine, Urine, Clean Catch [[id number]]  (Normal) Collected: 02/25/25 1630    Lab Status: Final result Specimen: Urine, Clean Catch Updated: 02/25/25 1726     LEGIONELLA ANTIGEN, URINE Negative    Respiratory Panel PCR w/COVID-19(SARS-CoV-2) CAREN/TYRELL/ONEAL/PAD/COR/AMRITA In-House, NP Swab in UTM/VTM, 2 HR TAT - Swab, Nasopharynx [[id number]]  (Abnormal) Collected: 02/25/25 0919    Lab Status: Final result Specimen: Swab from Nasopharynx Updated: 02/25/25 1119     ADENOVIRUS, PCR Not Detected     Coronavirus 229E Not Detected     Coronavirus HKU1 Not Detected     Coronavirus NL63 Detected     Coronavirus OC43 Not Detected     COVID19 Detected     Human Metapneumovirus Not Detected     Human Rhinovirus/Enterovirus Not Detected     Influenza A PCR Not Detected     Influenza B PCR Not Detected     Parainfluenza Virus 1 Not Detected     Parainfluenza Virus 2 Not Detected     Parainfluenza Virus 3 Not Detected     Parainfluenza Virus 4 Not Detected     RSV, PCR Not Detected     Bordetella pertussis pcr Not Detected     Bordetella parapertussis PCR Not Detected     Chlamydophila pneumoniae PCR Not Detected     Mycoplasma pneumo by PCR Not Detected    Narrative:      In the setting of a positive respiratory panel with a viral  infection PLUS a negative procalcitonin without other underlying concern for bacterial infection, consider observing off antibiotics or discontinuation of antibiotics and continue supportive care. If the respiratory panel is positive for atypical bacterial infection (Bordetella pertussis, Chlamydophila pneumoniae, or Mycoplasma pneumoniae), consider antibiotic de-escalation to target atypical bacterial infection.    Respiratory Culture - Sputum, Lung, Left Lower Lobe [[id number]] Collected: 02/22/25 2041    Lab Status: Final result Specimen: Sputum from Lung, Left Lower Lobe Updated: 02/25/25 1007     Respiratory Culture Moderate growth (3+) Normal respiratory makenzie. No S. aureus or Pseudomonas aeruginosa detected. Final report.     Gram Stain Many (4+) WBCs seen      Rare (1+) Epithelial cells seen    Blood Culture - Blood, Arm, Right [[id number]]  (Normal) Collected: 02/22/25 1915    Lab Status: Final result Specimen: Blood from Arm, Right Updated: 02/27/25 1931     Blood Culture No growth at 5 days    Narrative:      Less than seven (7) mL's of blood was collected.  Insufficient quantity may yield false negative results.    Blood Culture - Blood, Hand, Left [[id number]]  (Normal) Collected: 02/22/25 1910    Lab Status: Final result Specimen: Blood from Hand, Left Updated: 02/27/25 1931     Blood Culture No growth at 5 days    Narrative:      Less than seven (7) mL's of blood was collected.  Insufficient quantity may yield false negative results.    COVID PRE-OP / PRE-PROCEDURE SCREENING ORDER (NO ISOLATION) - Swab, Nasopharynx [[id number]]  (Normal) Collected: 02/22/25 1513    Lab Status: Final result Specimen: Swab from Nasopharynx Updated: 02/22/25 1600    Narrative:      The following orders were created for panel order COVID PRE-OP / PRE-PROCEDURE SCREENING ORDER (NO ISOLATION) - Swab, Nasopharynx.  Procedure                               Abnormality         Status                     ---------                                -----------         ------                     COVID-19, FLU A/B, RSV P...[[id number]]  Normal              Final result                 Please view results for these tests on the individual orders.    COVID-19, FLU A/B, RSV PCR 1 HR TAT - Swab, Nasopharynx [[id number]]  (Normal) Collected: 02/22/25 1513    Lab Status: Final result Specimen: Swab from Nasopharynx Updated: 02/22/25 1600     COVID19 Not Detected     Influenza A PCR Not Detected     Influenza B PCR Not Detected     RSV, PCR Not Detected    Narrative:      Fact sheet for providers: https://www.fda.gov/media/606388/download    Fact sheet for patients: https://www.fda.gov/media/786664/download    Test performed by PCR.          [x]  Radiology  CT Head Without Contrast    Result Date: 2/27/2025  Impression: 1.No definite acute intracranial findings. 2.Diffuse brain atrophy. 3.Extensive periventricular hypodense areas compatible with chronic small vessel ischemia. Electronically Signed: Leandro Feliciano MD  2/27/2025 2:13 PM EST  Workstation ID: [id number]    XR Chest 1 View    Result Date: 2/25/2025  Impression: 1. Low lung volumes with bilateral perihilar airspace opacities likely representing combination of atelectasis and bronchopneumonia. Electronically Signed: Georges Packer  2/25/2025 3:50 PM EST  Workstation ID: [id number]    CT Chest With Contrast Diagnostic    Addendum Date: 2/22/2025    ADDENDUM:  Postoperative changes involve the cervical spine and are partially imaged on the study. There are degenerative changes throughout the imaged spine.   Portions of this note were completed with a voice recognition program.  2/22/2025 9:42 PM by Sampson Giordano MD on Workstation: [employer]      Result Date: 2/22/2025  1.  Grossly, no proximal (or central) no pulmonary embolism. 2.  There is fusiform dilatation of the ascending aorta, which measures about 5.2 cm greatest diameter. No thoracic aortic dissection is seen. 3.  Right-sided bronchopneumonia is  suggested. 4.  Please see above comments for further detail.   Portions of this note were completed with a voice recognition program.  2/22/2025 9:39 PM by Sampson Giordano MD on Workstation: [employer]      CT Head Without Contrast    Result Date: 2/22/2025  Impression: No acute intracranial pathology. Electronically Signed: Sampson Brooks MD  2/22/2025 4:01 PM EST  Workstation ID: [id number]    XR Chest 1 View    Result Date: 2/22/2025  Impression: Hazy increased density over the right hemithorax may represent mild airspace disease. No dense consolidation or mass is seen. Electronically Signed: Alireza Flor MD  2/22/2025 3:13 PM EST  Workstation ID: [id number]   []  EKG/Telemetry   []  Cardiology/Vascular   []  Pathology  []  Old records  []  Other:    Assessment & Plan   Assessment / Plan     Assessment:  Acute hypoxic respiratory failure  Right side pneumonia, unknown bacterial origin  COVID-19 positive  Generalized weakness secondary to above  HFpEF, not in acute exacerbation  Hyperlipidemia  Hypertension  A-fib on Eliquis  Leukocytosis  Sundowning/hospital related delirium  Ascending AAA, 5.2 cm    Plan:  Continue inpatient admission  Continue supplemental O2 as needed.  Continue to wean as tolerated.  Completed IV Rocephin and azithromycin  Respiratory culture normal makenzie.  Strep pneumo and Legionella negative.  Respiratory PCR positive for COVID-19 and coronavirus strain.  Precautions ordered.  Discontinue Decadron due to agitation.  Brovana, Pulmicort, DuoNebs  Incentive spirometer, Acapella, chest vest  WBC improved today  Echo: EF 61%  CO2 36 stable.  Holding Lasix.  Continue PO Lasix 40 mg twice daily  Continue Eliquis 5 mg twice daily  Delirium improved today.  Workup negative.  CT scan reviewed.  Right sided bronchopneumonia.  AAA 5.2 cm -will need vascular follow-up.  PT/OT  A.m. labs       Discussed with RN.    VTE Prophylaxis:  Pharmacologic VTE prophylaxis orders are present.        CODE STATUS:   Level Of  Support Discussed With: Patient  Code Status (Patient has no pulse and is not breathing): CPR (Attempt to Resuscitate)  Medical Interventions (Patient has pulse or is breathing): Full Support      Electronically signed by Anahy Subramanian DO, 2/28/2025, 12:28 EST.

## 2025-02-28 NOTE — PLAN OF CARE
Goal Outcome Evaluation:  Plan of Care Reviewed With: patient        Progress: no change  Outcome Evaluation: Patient refused bipap last night. Unit is on standby in the room. Currently on 4L NC. Tolertaed breathing treatment this morning.

## 2025-02-28 NOTE — THERAPY EVALUATION
Patient Name: Sarah Osorio  : 1955    MRN: [id number]                              Today's Date: 2025       Admit Date: 2025    Visit Dx:     ICD-10-CM ICD-9-CM   1. Fall, initial encounter  W19.XXXA E888.9   2. Weakness  R53.1 780.79   3. Pneumonia due to infectious organism, unspecified laterality, unspecified part of lung  J18.9 486   4. Congestive heart failure, unspecified HF chronicity, unspecified heart failure type  I50.9 428.0   5. Acute respiratory failure with hypoxia  J96.01 518.81   6. Difficulty in walking  R26.2 719.7   7. Decreased activities of daily living (ADL)  Z78.9 V49.89     Patient Active Problem List   Diagnosis    Seizure    Encephalopathy, toxic    Acute on chronic respiratory failure with hypoxia    Weakness    Fall    Pneumonia due to infectious organism    Congestive heart failure    HTN (hypertension)     Past Medical History:   Diagnosis Date    Abdominal aneurysm     Atrial fib/flutter, transient     CHF (congestive heart failure)     Hyperlipidemia     Hypertension     Injury of back      Past Surgical History:   Procedure Laterality Date    APPENDECTOMY      CARDIAC CATHETERIZATION      HERNIA REPAIR        General Information       Row Name 25 1055          OT Time and Intention    Document Type evaluation  -AV     Mode of Treatment individual therapy;occupational therapy  -AV       Row Name 25 1054          General Information    Patient Profile Reviewed yes  -AV     Prior Level of Function independent:;ADL's;transfer  Primarily independent at home. stands to shower (tub). stands at sink to groom. standard commode. ambulates without assistive device. no home O2.  -AV     Existing Precautions/Restrictions fall;oxygen therapy device and L/min  fluid restrictions. enhanced airborne isolation: COVID+  -AV     Barriers to Rehab none identified  -AV       Row Name 25 1051          Occupational Profile    Reason for Services/Referral (Occupational  Profile) Patient is a 70 year old male admitted [facility] on 2/22/25 with weakness/ fall. He is currently on 3W/ 4L O2 with sats 95%/ HR 64. OT consulted due to recent decline in ADL/ transfer independence. No previous OT services for current condition.  -AV       Row Name 02/28/25 1055          Living Environment    People in Home spouse  -AV       Row Name 02/28/25 1055          Home Main Entrance    Number of Stairs, Main Entrance two  -AV       Row Name 02/28/25 1055          Stairs Within Home, Primary    Number of Stairs, Within Home, Primary none  -AV       Row Name 02/28/25 1055          Cognition    Orientation Status (Cognition) --  alert, very pleasant. cooperative and able to follow commands. questionable ability to retain information.  -AV       Row Name 02/28/25 1055          Safety Issues/Impairments Affecting Functional Mobility    Impairments Affecting Function (Mobility) balance;cognition;endurance/activity tolerance  -AV               User Key  (r) = Recorded By, (t) = Taken By, (c) = Cosigned By      Initials Name Provider Type    Juan Agustin OT Occupational Therapist                     Mobility/ADL's       Row Name 02/28/25 1059          Transfers    Comment, (Transfers) unable to transfer to bedside with max assist of 1 at OT eval.  -AV       Row Name 02/28/25 1059          Activities of Daily Living    BADL Assessment/Intervention --  Independent feeding with setup while seated. Max assist grooming with setup in bed. Max-dependent bathing and dressing. Dependent toilet hygiene: purewick catheter in place/ bedpan for further toilet hygiene.  -AV               User Key  (r) = Recorded By, (t) = Taken By, (c) = Cosigned By      Initials Name Provider Type    Juan Agustin OT Occupational Therapist                   Obj/Interventions       Row Name 02/28/25 1100          Sensory Assessment (Somatosensory)    Sensory Assessment (Somatosensory) UE sensation intact  -AV        [facility] Name 02/28/25 1100          Vision Assessment/Intervention    Visual Impairment/Limitations WFL;corrective lenses for reading  -AV       Row Name 02/28/25 1100          Range of Motion Comprehensive    General Range of Motion bilateral upper extremity ROM WFL  -AV     Comment, General Range of Motion AROM  -AV       Row Name 02/28/25 1100          Strength Comprehensive (MMT)    Comment, General Manual Muscle Testing (MMT) Assessment 5/5 bilateral biceps, triceps and   -AV       Row Name 02/28/25 1100          Motor Skills    Motor Skills coordination;functional endurance  -AV     Coordination WFL  right dominant  -AV     Functional Endurance poor  -AV       Row Name 02/28/25 1100          Balance    Comment, Balance unable to assess with attempt of max assist to transfer bedside. required min assist static sitting balance at PT eval 2/26/25  -AV               User Key  (r) = Recorded By, (t) = Taken By, (c) = Cosigned By      Initials Name Provider Type    Juan Agustin OT Occupational Therapist                   Goals/Plan       Row Name 02/28/25 1108          Transfer Goal 1 (OT)    Activity/Assistive Device (Transfer Goal 1, OT) transfers, all  -AV     [place] Level/Cues Needed (Transfer Goal 1, OT) modified independence  -AV     Time Frame (Transfer Goal 1, OT) long term goal (LTG);10 days  -AV       Row Name 02/28/25 1108          Bathing Goal 1 (OT)    Activity/Device (Bathing Goal 1, OT) bathing skills, all;tub bench  -AV     [place] Level/Cues Needed (Bathing Goal 1, OT) modified independence  -AV     Time Frame (Bathing Goal 1, OT) long term goal (LTG);10 days  -AV       [facility] Name 02/28/25 1108          Dressing Goal 1 (OT)    Activity/Device (Dressing Goal 1, OT) dressing skills, all  -AV     [place]/Cues Needed (Dressing Goal 1, OT) modified independence  -AV     Time Frame (Dressing Goal 1, OT) long term goal (LTG);10 days  -AV       [facility] Name 02/28/25 1108           Toileting Goal 1 (OT)    Activity/Device (Toileting Goal 1, OT) toileting skills, all;raised toilet seat  -AV     [place] Level/Cues Needed (Toileting Goal 1, OT) modified independence  -AV     Time Frame (Toileting Goal 1, OT) long term goal (LTG);10 days  -AV       Row Name 02/28/25 1108          Grooming Goal 1 (OT)    Activity/Device (Grooming Goal 1, OT) grooming skills, all  standing at sink  -AV     [place] (Grooming Goal 1, OT) modified independence  -AV     Time Frame (Grooming Goal 1, OT) long term goal (LTG);10 days  -AV       Row Name 02/28/25 1108          Problem Specific Goal 1 (OT)    Problem Specific Goal 1 (OT) Patient will demonstrate fair endurance/activity tolerance needed to support ADLs.  -AV     Time Frame (Problem Specific Goal 1, OT) long term goal (LTG);10 days  -AV       Row Name 02/28/25 1108          Therapy Assessment/Plan (OT)    Planned Therapy Interventions (OT) activity tolerance training;BADL retraining;functional balance retraining;occupation/activity based interventions;patient/caregiver education/training;ROM/therapeutic exercise;transfer/mobility retraining  -AV               User Key  (r) = Recorded By, (t) = Taken By, (c) = Cosigned By      Initials Name Provider Type    Juan Agustin, OT Occupational Therapist                   Clinical Impression       Row Name 02/28/25 1101          Pain Assessment    Pretreatment Pain Rating 0/10 - no pain  -AV     Posttreatment Pain Rating 0/10 - no pain  -AV       Row Name 02/28/25 1105          Plan of Care Review    Plan of Care Reviewed With patient  -AV     Progress no change  first session for evaluation  -AV     Outcome Evaluation Patient presents with limitations of endurance/activity tolerance, balance and cognition which are impacting ADL/ transfer independence. Skilled OT services are indicated to remediate/ compensate for deficits to maximize independence and safety with functional ADL tasks.  -AV       Row  Name 02/28/25 1105          Therapy Assessment/Plan (OT)    Patient/Family Therapy Goal Statement (OT) none stated  -AV     Rehab Potential (OT) good  -AV     Criteria for Skilled Therapeutic Interventions Met (OT) yes;meets criteria;skilled treatment is necessary  -AV     Therapy Frequency (OT) 5 times/wk  -AV       Row Name 02/28/25 1105          Therapy Plan Review/Discharge Plan (OT)    Equipment Needs Upon Discharge (OT) commode chair;tub bench  has RW available at home  -AV     Anticipated Discharge Disposition (OT) inpatient rehabilitation facility  -AV       Row Name 02/28/25 1105          Vital Signs    O2 Delivery Pre Treatment nasal cannula  4L  -AV     O2 Delivery Intra Treatment nasal cannula  4L  -AV     O2 Delivery Post Treatment nasal cannula  4L  -AV       Row Name 02/28/25 1105          Positioning and Restraints    Pre-Treatment Position in bed  -AV     Post Treatment Position bed  -AV     In Bed call light within reach;encouraged to call for assist;exit alarm on  -AV               User Key  (r) = Recorded By, (t) = Taken By, (c) = Cosigned By      Initials Name Provider Type    AV Juan Morris, OT Occupational Therapist                   Outcome Measures       Row Name 02/28/25 1109          How much help from another is currently needed...    Putting on and taking off regular lower body clothing? 1  -AV     Bathing (including washing, rinsing, and drying) 2  -AV     Toileting (which includes using toilet bed pan or urinal) 1  -AV     Putting on and taking off regular upper body clothing 2  -AV     Taking care of personal grooming (such as brushing teeth) 2  -AV     Eating meals 4  -AV     AM-PAC 6 Clicks Score (OT) 12  -AV       Row Name 02/28/25 1109          Functional Assessment    Outcome Measure Options AM-PAC 6 Clicks Daily Activity (OT);Optimal Instrument  -AV       Row Name 02/28/25 1109          Optimal Instrument    Optimal Instrument Optimal - 3  -AV     Bending/Stooping 5  -AV      Standing 5  -AV     Reaching 1  -AV     From the list, choose the 3 activities you would most like to be able to do without any difficulty Bending/stooping;Standing;Reaching  -AV     Total Score Optimal - 3 11  -AV               User Key  (r) = Recorded By, (t) = Taken By, (c) = Cosigned By      Initials Name Provider Type    AV Juan Morris OT Occupational Therapist                    Occupational Therapy Education       Title: PT OT SLP Therapies (In Progress)       Topic: Occupational Therapy (Done)       Point: ADL training (Done)       Description:   Instruct learner(s) on proper safety adaptation and remediation techniques during self care or transfers.   Instruct in proper use of assistive devices.                  Learning Progress Summary            Patient Acceptance, E, VU by AV at 2/28/2025 1110                      Point: Home exercise program (Done)       Description:   Instruct learner(s) on appropriate technique for monitoring, assisting and/or progressing therapeutic exercises/activities.                  Learning Progress Summary            Patient Acceptance, E, VU by AV at 2/28/2025 1110                      Point: Precautions (Done)       Description:   Instruct learner(s) on prescribed precautions during self-care and functional transfers.                  Learning Progress Summary            Patient Acceptance, E, VU by AV at 2/28/2025 1110                      Point: Body mechanics (Done)       Description:   Instruct learner(s) on proper positioning and spine alignment during self-care, functional mobility activities and/or exercises.                  Learning Progress Summary            Patient Acceptance, E, VU by AV at 2/28/2025 1110                                      User Key       Initials Effective Dates Name Provider Type Discipline     06/16/21 -  Juan Morris OT Occupational Therapist OT                  OT Recommendation and Plan  Planned Therapy Interventions (OT):  activity tolerance training, BADL retraining, functional balance retraining, occupation/activity based interventions, patient/caregiver education/training, ROM/therapeutic exercise, transfer/mobility retraining  Therapy Frequency (OT): 5 times/wk  Plan of Care Review  Plan of Care Reviewed With: patient  Progress: no change (first session for evaluation)  Outcome Evaluation: Patient presents with limitations of endurance/activity tolerance, balance and cognition which are impacting ADL/ transfer independence. Skilled OT services are indicated to remediate/ compensate for deficits to maximize independence and safety with functional ADL tasks.     Time Calculation:   Evaluation Complexity (OT)  Review Occupational Profile/Medical/Therapy History Complexity: expanded/moderate complexity  Assessment, Occupational Performance/Identification of Deficit Complexity: 1-3 performance deficits  Clinical Decision Making Complexity (OT): problem focused assessment/low complexity  Overall Complexity of Evaluation (OT): low complexity     Time Calculation- OT       Row Name 02/28/25 1111             Time Calculation- OT    OT Received On 02/28/25  -AV      OT Goal Re-Cert Due Date 03/09/25  -AV         Untimed Charges    OT Eval/Re-eval Minutes 35  -AV         Total Minutes    Untimed Charges Total Minutes 35  -AV       Total Minutes 35  -AV                User Key  (r) = Recorded By, (t) = Taken By, (c) = Cosigned By      Initials Name Provider Type    AV Juan Morris OT Occupational Therapist                  Therapy Charges for Today       Code Description Service Date Service Provider Modifiers Qty    10515359422 HC OT EVAL LOW COMPLEXITY 3 2/28/2025 Juan Morris OT GO 1                 Juan Morris OT  2/28/2025

## 2025-03-01 LAB
ANION GAP SERPL CALCULATED.3IONS-SCNC: 8.1 MMOL/L (ref 5–15)
BASOPHILS # BLD AUTO: 0.02 10*3/MM3 (ref 0–0.2)
BASOPHILS NFR BLD AUTO: 0.2 % (ref 0–1.5)
BUN SERPL-MCNC: 25 MG/DL (ref 8–23)
BUN/CREAT SERPL: 26 (ref 7–25)
CALCIUM SPEC-SCNC: 8.8 MG/DL (ref 8.6–10.5)
CHLORIDE SERPL-SCNC: 88 MMOL/L (ref 98–107)
CO2 SERPL-SCNC: 36.9 MMOL/L (ref 22–29)
CREAT SERPL-MCNC: 0.96 MG/DL (ref 0.76–1.27)
DEPRECATED RDW RBC AUTO: 44 FL (ref 37–54)
EGFRCR SERPLBLD CKD-EPI 2021: 84.5 ML/MIN/1.73
EOSINOPHIL # BLD AUTO: 0.05 10*3/MM3 (ref 0–0.4)
EOSINOPHIL NFR BLD AUTO: 0.5 % (ref 0.3–6.2)
ERYTHROCYTE [DISTWIDTH] IN BLOOD BY AUTOMATED COUNT: 12.4 % (ref 12.3–15.4)
GLUCOSE SERPL-MCNC: 213 MG/DL (ref 65–99)
HCT VFR BLD AUTO: 46.4 % (ref 37.5–51)
HGB BLD-MCNC: 14.9 G/DL (ref 13–17.7)
IMM GRANULOCYTES # BLD AUTO: 0.13 10*3/MM3 (ref 0–0.05)
IMM GRANULOCYTES NFR BLD AUTO: 1.2 % (ref 0–0.5)
LYMPHOCYTES # BLD AUTO: 1.13 10*3/MM3 (ref 0.7–3.1)
LYMPHOCYTES NFR BLD AUTO: 10.3 % (ref 19.6–45.3)
MAGNESIUM SERPL-MCNC: 2.2 MG/DL (ref 1.6–2.4)
MCH RBC QN AUTO: 30.9 PG (ref 26.6–33)
MCHC RBC AUTO-ENTMCNC: 32.1 G/DL (ref 31.5–35.7)
MCV RBC AUTO: 96.3 FL (ref 79–97)
MONOCYTES # BLD AUTO: 0.77 10*3/MM3 (ref 0.1–0.9)
MONOCYTES NFR BLD AUTO: 7 % (ref 5–12)
NEUTROPHILS NFR BLD AUTO: 8.85 10*3/MM3 (ref 1.7–7)
NEUTROPHILS NFR BLD AUTO: 80.8 % (ref 42.7–76)
NRBC BLD AUTO-RTO: 0 /100 WBC (ref 0–0.2)
PHOSPHATE SERPL-MCNC: 3.9 MG/DL (ref 2.5–4.5)
PLATELET # BLD AUTO: 311 10*3/MM3 (ref 140–450)
PMV BLD AUTO: 9.4 FL (ref 6–12)
POTASSIUM SERPL-SCNC: 3.4 MMOL/L (ref 3.5–5.2)
RBC # BLD AUTO: 4.82 10*6/MM3 (ref 4.14–5.8)
SODIUM SERPL-SCNC: 133 MMOL/L (ref 136–145)
WBC NRBC COR # BLD AUTO: 10.95 10*3/MM3 (ref 3.4–10.8)

## 2025-03-01 PROCEDURE — 25010000002 MORPHINE PER 10 MG: Performed by: STUDENT IN AN ORGANIZED HEALTH CARE EDUCATION/TRAINING PROGRAM

## 2025-03-01 PROCEDURE — 99232 SBSQ HOSP IP/OBS MODERATE 35: CPT | Performed by: STUDENT IN AN ORGANIZED HEALTH CARE EDUCATION/TRAINING PROGRAM

## 2025-03-01 PROCEDURE — 94799 UNLISTED PULMONARY SVC/PX: CPT

## 2025-03-01 PROCEDURE — 83735 ASSAY OF MAGNESIUM: CPT | Performed by: STUDENT IN AN ORGANIZED HEALTH CARE EDUCATION/TRAINING PROGRAM

## 2025-03-01 PROCEDURE — 85025 COMPLETE CBC W/AUTO DIFF WBC: CPT | Performed by: STUDENT IN AN ORGANIZED HEALTH CARE EDUCATION/TRAINING PROGRAM

## 2025-03-01 PROCEDURE — 25010000002 KETOROLAC TROMETHAMINE PER 15 MG: Performed by: STUDENT IN AN ORGANIZED HEALTH CARE EDUCATION/TRAINING PROGRAM

## 2025-03-01 PROCEDURE — 80048 BASIC METABOLIC PNL TOTAL CA: CPT | Performed by: STUDENT IN AN ORGANIZED HEALTH CARE EDUCATION/TRAINING PROGRAM

## 2025-03-01 PROCEDURE — 94660 CPAP INITIATION&MGMT: CPT

## 2025-03-01 PROCEDURE — 94761 N-INVAS EAR/PLS OXIMETRY MLT: CPT

## 2025-03-01 PROCEDURE — 84100 ASSAY OF PHOSPHORUS: CPT | Performed by: STUDENT IN AN ORGANIZED HEALTH CARE EDUCATION/TRAINING PROGRAM

## 2025-03-01 RX ORDER — KETOROLAC TROMETHAMINE 15 MG/ML
15 INJECTION, SOLUTION INTRAMUSCULAR; INTRAVENOUS ONCE
Status: COMPLETED | OUTPATIENT
Start: 2025-03-01 | End: 2025-03-01

## 2025-03-01 RX ORDER — KETOROLAC TROMETHAMINE 15 MG/ML
15 INJECTION, SOLUTION INTRAMUSCULAR; INTRAVENOUS EVERY 6 HOURS PRN
Status: DISPENSED | OUTPATIENT
Start: 2025-03-01 | End: 2025-03-06

## 2025-03-01 RX ORDER — POTASSIUM CHLORIDE 750 MG/1
40 CAPSULE, EXTENDED RELEASE ORAL ONCE
Status: COMPLETED | OUTPATIENT
Start: 2025-03-01 | End: 2025-03-01

## 2025-03-01 RX ADMIN — KETOROLAC TROMETHAMINE 15 MG: 15 INJECTION, SOLUTION INTRAMUSCULAR; INTRAVENOUS at 00:35

## 2025-03-01 RX ADMIN — MORPHINE SULFATE 4 MG: 4 INJECTION, SOLUTION INTRAMUSCULAR; INTRAVENOUS at 02:05

## 2025-03-01 RX ADMIN — BUDESONIDE 0.5 MG: 0.5 INHALANT RESPIRATORY (INHALATION) at 19:10

## 2025-03-01 RX ADMIN — CARVEDILOL 3.12 MG: 3.12 TABLET, FILM COATED ORAL at 18:00

## 2025-03-01 RX ADMIN — GABAPENTIN 800 MG: 400 CAPSULE ORAL at 21:15

## 2025-03-01 RX ADMIN — IPRATROPIUM BROMIDE AND ALBUTEROL SULFATE 3 ML: .5; 3 SOLUTION RESPIRATORY (INHALATION) at 07:16

## 2025-03-01 RX ADMIN — BUDESONIDE 0.5 MG: 0.5 INHALANT RESPIRATORY (INHALATION) at 07:16

## 2025-03-01 RX ADMIN — CLOPIDOGREL BISULFATE 75 MG: 75 TABLET ORAL at 08:20

## 2025-03-01 RX ADMIN — Medication 250 MG: at 21:15

## 2025-03-01 RX ADMIN — Medication 250 MG: at 08:20

## 2025-03-01 RX ADMIN — Medication 10 ML: at 21:16

## 2025-03-01 RX ADMIN — Medication 10 ML: at 08:21

## 2025-03-01 RX ADMIN — HYDROCODONE BITARTRATE AND ACETAMINOPHEN 1 TABLET: 10; 325 TABLET ORAL at 15:06

## 2025-03-01 RX ADMIN — GUAIFENESIN 600 MG: 600 TABLET ORAL at 08:20

## 2025-03-01 RX ADMIN — ARFORMOTEROL TARTRATE 15 MCG: 15 SOLUTION RESPIRATORY (INHALATION) at 19:10

## 2025-03-01 RX ADMIN — GABAPENTIN 800 MG: 400 CAPSULE ORAL at 05:53

## 2025-03-01 RX ADMIN — KETOROLAC TROMETHAMINE 15 MG: 15 INJECTION, SOLUTION INTRAMUSCULAR; INTRAVENOUS at 23:57

## 2025-03-01 RX ADMIN — APIXABAN 5 MG: 5 TABLET, FILM COATED ORAL at 08:20

## 2025-03-01 RX ADMIN — SACUBITRIL AND VALSARTAN 1 TABLET: 49; 51 TABLET, FILM COATED ORAL at 21:15

## 2025-03-01 RX ADMIN — GUAIFENESIN 600 MG: 600 TABLET ORAL at 21:15

## 2025-03-01 RX ADMIN — SACUBITRIL AND VALSARTAN 1 TABLET: 49; 51 TABLET, FILM COATED ORAL at 08:20

## 2025-03-01 RX ADMIN — GABAPENTIN 800 MG: 400 CAPSULE ORAL at 13:45

## 2025-03-01 RX ADMIN — HYDROCODONE BITARTRATE AND ACETAMINOPHEN 1 TABLET: 10; 325 TABLET ORAL at 21:16

## 2025-03-01 RX ADMIN — ARFORMOTEROL TARTRATE 15 MCG: 15 SOLUTION RESPIRATORY (INHALATION) at 07:16

## 2025-03-01 RX ADMIN — POTASSIUM CHLORIDE 40 MEQ: 750 CAPSULE, EXTENDED RELEASE ORAL at 09:30

## 2025-03-01 RX ADMIN — CARVEDILOL 3.12 MG: 3.12 TABLET, FILM COATED ORAL at 08:20

## 2025-03-01 RX ADMIN — AMIODARONE HYDROCHLORIDE 200 MG: 200 TABLET ORAL at 08:20

## 2025-03-01 RX ADMIN — HYDROCODONE BITARTRATE AND ACETAMINOPHEN 1 TABLET: 10; 325 TABLET ORAL at 08:27

## 2025-03-01 RX ADMIN — PANTOPRAZOLE SODIUM 40 MG: 40 TABLET, DELAYED RELEASE ORAL at 08:20

## 2025-03-01 RX ADMIN — KETOROLAC TROMETHAMINE 15 MG: 15 INJECTION, SOLUTION INTRAMUSCULAR; INTRAVENOUS at 18:00

## 2025-03-01 RX ADMIN — APIXABAN 5 MG: 5 TABLET, FILM COATED ORAL at 21:16

## 2025-03-01 NOTE — PLAN OF CARE
Goal Outcome Evaluation:  Plan of Care Reviewed With: patient        Progress: no change  Outcome Evaluation: Patient wore BIPAP lat night 14/6 for less than an hour. I was able to titrate him off of oxygen this morning since he does not wear O2. He remains on room air currently. BIPAP at bedside, and this will be used PRN/nightly as tolerated.

## 2025-03-01 NOTE — PLAN OF CARE
Goal Outcome Evaluation:              Outcome Evaluation: Patient aox4. C/o chronic neck pain, given pain medication as ordered. pain medication wasn't lasting long, torodol on board for break through pain. Experiencing incontinent episodes throughout shift. Call light in reach.

## 2025-03-01 NOTE — PROGRESS NOTES
[facility]   Hospitalist Progress Note  Date: 3/1/2025  Patient Name: Sarah Osorio  : 1955  MRN: [id number]  Date of admission: 2025  Room/Bed: [facility]      Subjective   Subjective     Chief Complaint: Weakness, fall    Summary:Sarah Osorio is a 70 y.o. male with past medical history of hypertension, hyperlipidemia, diastolic CHF, A-fib on Eliquis, chronic back pain following injury, morbid obesity and GERD presented to the ED for evaluation of generalized weakness and fall.  Patient said that he has been feeling rather weak with dyspnea on exertion the last few days and around 4 AM this morning patient went to the bathroom and fell to the floor and was so weak that he could not get back up. He crawled back to his bedroom and stayed on the floor for hours until wife decided to call EMS to bring him to the ED for further evaluation.  In the ED patient was hypoxic on arrival requiring oxygen supplementation via nasal cannula with remaining vitals being within normal limits.  Labs showed leukocytosis, electrolyte imbalances, elevated proBNP and elevated but flat troponin.  Remaining labs being relatively unremarkable including negative COVID flu RSV.  CT of the chest is negative for any PE but did show some right-sided bronchopneumonia.  When seen patient was complaining of severe lower back pain but when asked he denied any recent fevers, headaches, focal weakness, chest pain, palpitation, abdominal pain, nausea, vomiting, diarrhea, constipation, dysuria, hematuria, hematochezia, melena, or anxiety.  Patient admitted for further evaluation and treatment.  Respiratory status improving.  Requiring oxygen.  Obtaining transthoracic echo to look for dilatation of ascending aorta seen on CT scan measuring 5.2 cm (seems like he had 4.7-4.9 cm aortic aorta aneurysm which has been stable in the past).  Blood culture and respiratory culture pending.     Interval Followup: No acute overnight events.  No acute  distress.  Patient resting comfortably in bed.  Wife is at bedside.  He has been taken off supplemental O2 this morning and is satting well, denies any shortness of breath.  Asking to work with PT/OT, interested in inpatient rehab if qualifies.    Objective   Objective     Vitals:   Temp:  [97.5 °F (36.4 °C)-98.7 °F (37.1 °C)] 97.5 °F (36.4 °C)  Heart Rate:  [49-70] 66  Resp:  [18-20] 18  BP: (117-150)/(66-85) 131/73  Flow (L/min) (Oxygen Therapy):  [1-4] 1    Physical Exam   Gen: NAD, Alert and Oriented  Cards: RRR  Pulm: Room air, no respiratory distress  Abd: soft, nondistended  Extremities: no pitting edema    Result Review    Result Review:  I have personally reviewed these results:  [x]  Laboratory      Lab 03/01/25 0436 02/28/25 0424 02/27/25 0417 02/23/25  0554 02/22/25 1915   WBC 10.95* 12.56* 14.00*   < >  --    HEMOGLOBIN 14.9 14.9 15.1   < >  --    HEMATOCRIT 46.4 45.5 47.8   < >  --    PLATELETS 311 307 320   < >  --    NEUTROS ABS 8.85* 10.80* 12.38*   < >  --    IMMATURE GRANS (ABS) 0.13* 0.11* 0.14*   < >  --    LYMPHS ABS 1.13 0.74 0.73   < >  --    MONOS ABS 0.77 0.86 0.72   < >  --    EOS ABS 0.05 0.00 0.00   < >  --    MCV 96.3 95.8 96.2   < >  --    LACTATE  --   --   --   --  1.0    < > = values in this interval not displayed.         Lab 03/01/25 0436 02/28/25 0424 02/27/25 0417   SODIUM 133* 135* 136   POTASSIUM 3.4* 3.7 3.8   CHLORIDE 88* 91* 88*   CO2 36.9* 36.2* 36.6*   ANION GAP 8.1 7.8 11.4   BUN 25* 22 18   CREATININE 0.96 0.82 0.82   EGFR 84.5 93.9 93.9   GLUCOSE 213* 153* 143*   CALCIUM 8.8 8.7 9.5   MAGNESIUM 2.2 2.2 2.1   PHOSPHORUS 3.9 3.6 2.1*         Lab 02/22/25  1500   TOTAL PROTEIN 6.8   ALBUMIN 3.3*   GLOBULIN 3.5   ALT (SGPT) 8   AST (SGOT) 31   BILIRUBIN 0.9   ALK PHOS 145*         Lab 02/22/25  1606 02/22/25  1500   PROBNP  --  1,209.0*   HSTROP T 24* 26*                 Lab 02/27/25  2053 02/27/25  1028 02/26/25  2341   PH, ARTERIAL 7.471* 7.390 7.420   PCO2,  ARTERIAL 60.6* 57.1* 61.8*   PO2 ART 69.5* 58.3* 56.8*   O2 SATURATION ART 94.0* 88.7* 88.5*   FIO2 36 28 28   HCO3 ART 44.3* 34.6* 40.1*   BASE EXCESS ART 16.2* 7.3* 12.3*     Brief Urine Lab Results  (Last result in the past 365 days)        Color   Clarity   Blood   Leuk Est   Nitrite   Protein   CREAT   Urine HCG        02/26/25 1328 Yellow   Clear   Negative   Negative   Negative   Negative                 [x]  Microbiology   Microbiology Results (last 10 days)       Procedure Component Value - Date/Time    S. Pneumo Ag Urine or CSF - Urine, Urine, Clean Catch [[id number]]  (Normal) Collected: 02/25/25 1630    Lab Status: Final result Specimen: Urine, Clean Catch Updated: 02/25/25 1726     Strep Pneumo Ag Negative    Legionella Antigen, Urine - Urine, Urine, Clean Catch [[id number]]  (Normal) Collected: 02/25/25 1630    Lab Status: Final result Specimen: Urine, Clean Catch Updated: 02/25/25 1726     LEGIONELLA ANTIGEN, URINE Negative    Respiratory Panel PCR w/COVID-19(SARS-CoV-2) CAREN/TYRELL/ONEAL/PAD/COR/AMRITA In-House, NP Swab in UTM/VTM, 2 HR TAT - Swab, Nasopharynx [[id number]]  (Abnormal) Collected: 02/25/25 0919    Lab Status: Final result Specimen: Swab from Nasopharynx Updated: 02/25/25 1119     ADENOVIRUS, PCR Not Detected     Coronavirus 229E Not Detected     Coronavirus HKU1 Not Detected     Coronavirus NL63 Detected     Coronavirus OC43 Not Detected     COVID19 Detected     Human Metapneumovirus Not Detected     Human Rhinovirus/Enterovirus Not Detected     Influenza A PCR Not Detected     Influenza B PCR Not Detected     Parainfluenza Virus 1 Not Detected     Parainfluenza Virus 2 Not Detected     Parainfluenza Virus 3 Not Detected     Parainfluenza Virus 4 Not Detected     RSV, PCR Not Detected     Bordetella pertussis pcr Not Detected     Bordetella parapertussis PCR Not Detected     Chlamydophila pneumoniae PCR Not Detected     Mycoplasma pneumo by PCR Not Detected    Narrative:      In the setting of a  positive respiratory panel with a viral infection PLUS a negative procalcitonin without other underlying concern for bacterial infection, consider observing off antibiotics or discontinuation of antibiotics and continue supportive care. If the respiratory panel is positive for atypical bacterial infection (Bordetella pertussis, Chlamydophila pneumoniae, or Mycoplasma pneumoniae), consider antibiotic de-escalation to target atypical bacterial infection.    Respiratory Culture - Sputum, Lung, Left Lower Lobe [[id number]] Collected: 02/22/25 2041    Lab Status: Final result Specimen: Sputum from Lung, Left Lower Lobe Updated: 02/25/25 1007     Respiratory Culture Moderate growth (3+) Normal respiratory makenzie. No S. aureus or Pseudomonas aeruginosa detected. Final report.     Gram Stain Many (4+) WBCs seen      Rare (1+) Epithelial cells seen    Blood Culture - Blood, Arm, Right [[id number]]  (Normal) Collected: 02/22/25 1915    Lab Status: Final result Specimen: Blood from Arm, Right Updated: 02/27/25 1931     Blood Culture No growth at 5 days    Narrative:      Less than seven (7) mL's of blood was collected.  Insufficient quantity may yield false negative results.    Blood Culture - Blood, Hand, Left [[id number]]  (Normal) Collected: 02/22/25 1910    Lab Status: Final result Specimen: Blood from Hand, Left Updated: 02/27/25 1931     Blood Culture No growth at 5 days    Narrative:      Less than seven (7) mL's of blood was collected.  Insufficient quantity may yield false negative results.    COVID PRE-OP / PRE-PROCEDURE SCREENING ORDER (NO ISOLATION) - Swab, Nasopharynx [[id number]]  (Normal) Collected: 02/22/25 1513    Lab Status: Final result Specimen: Swab from Nasopharynx Updated: 02/22/25 1600    Narrative:      The following orders were created for panel order COVID PRE-OP / PRE-PROCEDURE SCREENING ORDER (NO ISOLATION) - Swab, Nasopharynx.  Procedure                               Abnormality         Status                      ---------                               -----------         ------                     COVID-19, FLU A/B, RSV P...[[id number]]  Normal              Final result                 Please view results for these tests on the individual orders.    COVID-19, FLU A/B, RSV PCR 1 HR TAT - Swab, Nasopharynx [[id number]]  (Normal) Collected: 02/22/25 1513    Lab Status: Final result Specimen: Swab from Nasopharynx Updated: 02/22/25 1600     COVID19 Not Detected     Influenza A PCR Not Detected     Influenza B PCR Not Detected     RSV, PCR Not Detected    Narrative:      Fact sheet for providers: https://www.fda.gov/media/218336/download    Fact sheet for patients: https://www.fda.gov/media/059987/download    Test performed by PCR.          [x]  Radiology  CT Head Without Contrast    Result Date: 2/27/2025  Impression: 1.No definite acute intracranial findings. 2.Diffuse brain atrophy. 3.Extensive periventricular hypodense areas compatible with chronic small vessel ischemia. Electronically Signed: Leandro Feliciano MD  2/27/2025 2:13 PM EST  Workstation ID: [id number]    XR Chest 1 View    Result Date: 2/25/2025  Impression: 1. Low lung volumes with bilateral perihilar airspace opacities likely representing combination of atelectasis and bronchopneumonia. Electronically Signed: Georges Packer  2/25/2025 3:50 PM EST  Workstation ID: [id number]    CT Chest With Contrast Diagnostic    Addendum Date: 2/22/2025    ADDENDUM:  Postoperative changes involve the cervical spine and are partially imaged on the study. There are degenerative changes throughout the imaged spine.   Portions of this note were completed with a voice recognition program.  2/22/2025 9:42 PM by Sampson Giordano MD on Workstation: [email or web address]      Result Date: 2/22/2025  1.  Grossly, no proximal (or central) no pulmonary embolism. 2.  There is fusiform dilatation of the ascending aorta, which measures about 5.2 cm greatest diameter. No thoracic aortic dissection is  seen. 3.  Right-sided bronchopneumonia is suggested. 4.  Please see above comments for further detail.   Portions of this note were completed with a voice recognition program.  2/22/2025 9:39 PM by Sampson Giordano MD on Workstation: [id number]      CT Head Without Contrast    Result Date: 2/22/2025  Impression: No acute intracranial pathology. Electronically Signed: Sampson Brooks MD  2/22/2025 4:01 PM EST  Workstation ID: [id number]    XR Chest 1 View    Result Date: 2/22/2025  Impression: Hazy increased density over the right hemithorax may represent mild airspace disease. No dense consolidation or mass is seen. Electronically Signed: Alireza Flor MD  2/22/2025 3:13 PM EST  Workstation ID: [id number]   []  EKG/Telemetry   []  Cardiology/Vascular   []  Pathology  []  Old records  []  Other:    Assessment & Plan   Assessment / Plan     Assessment:  Acute hypoxic respiratory failure  Right side pneumonia, unknown bacterial origin  COVID-19 positive  Generalized weakness secondary to above  HFpEF, not in acute exacerbation  Hyperlipidemia  Hypertension  A-fib on Eliquis  Leukocytosis  Sundowning/hospital related delirium -resolved  Ascending AAA, 5.2 cm    Plan:  Continue inpatient admission  Continue supplemental O2 as needed.  Continue to wean as tolerated.  Completed IV Rocephin and azithromycin  Respiratory PCR positive for COVID-19 and coronavirus strain.  Precautions ordered.  Discontinue Decadron due to agitation.  Brovana, Pulmicort, DuoNebs  Incentive spirometer, Acapella, chest vest  WBC improving, elevation was likely secondary to steroids  Echo: EF 61%  CO2 stable  Hold PO Lasix 40 mg twice daily  Continue Eliquis 5 mg twice daily  CT scan reviewed.  Right sided bronchopneumonia.  AAA 5.2 cm -will need vascular follow-up.  Suspect patient has underlying sleep apnea or obesity hypoventilation syndrome given that he is a chronic CO2 retainer.  Will refer him to sleep eval at discharge.  PT/OT  A.m. labs        Discussed with RN.    VTE Prophylaxis:  Pharmacologic VTE prophylaxis orders are present.        CODE STATUS:   Level Of Support Discussed With: Patient  Code Status (Patient has no pulse and is not breathing): CPR (Attempt to Resuscitate)  Medical Interventions (Patient has pulse or is breathing): Full Support      Electronically signed by Anahy Subramanian DO, 3/1/2025, 13:09 EST.

## 2025-03-01 NOTE — PLAN OF CARE
"Goal Outcome Evaluation:pt only wore BIPAP for 30 mins despite encouragement. He stated \"he just couldn't do it\", it's miserable\". He is on 3 l.                                            "

## 2025-03-01 NOTE — SIGNIFICANT NOTE
03/01/25 1300   Physical Therapy Time and Intention   Session Not Performed patient/family declined treatment   Comment, Session Not Performed PT attempted PT treatment this date. Pt refused activities today despite max encouragement. PT will check back when able.

## 2025-03-02 LAB
ANION GAP SERPL CALCULATED.3IONS-SCNC: 6.7 MMOL/L (ref 5–15)
BASOPHILS # BLD AUTO: 0.03 10*3/MM3 (ref 0–0.2)
BASOPHILS NFR BLD AUTO: 0.3 % (ref 0–1.5)
BUN SERPL-MCNC: 30 MG/DL (ref 8–23)
BUN/CREAT SERPL: 35.7 (ref 7–25)
CALCIUM SPEC-SCNC: 8.1 MG/DL (ref 8.6–10.5)
CHLORIDE SERPL-SCNC: 89 MMOL/L (ref 98–107)
CO2 SERPL-SCNC: 35.3 MMOL/L (ref 22–29)
CREAT SERPL-MCNC: 0.84 MG/DL (ref 0.76–1.27)
DEPRECATED RDW RBC AUTO: 44.3 FL (ref 37–54)
EGFRCR SERPLBLD CKD-EPI 2021: 93.2 ML/MIN/1.73
EOSINOPHIL # BLD AUTO: 0.26 10*3/MM3 (ref 0–0.4)
EOSINOPHIL NFR BLD AUTO: 2.9 % (ref 0.3–6.2)
ERYTHROCYTE [DISTWIDTH] IN BLOOD BY AUTOMATED COUNT: 12.6 % (ref 12.3–15.4)
GLUCOSE SERPL-MCNC: 129 MG/DL (ref 65–99)
HCT VFR BLD AUTO: 43.9 % (ref 37.5–51)
HGB BLD-MCNC: 14.3 G/DL (ref 13–17.7)
IMM GRANULOCYTES # BLD AUTO: 0.13 10*3/MM3 (ref 0–0.05)
IMM GRANULOCYTES NFR BLD AUTO: 1.4 % (ref 0–0.5)
LYMPHOCYTES # BLD AUTO: 1.36 10*3/MM3 (ref 0.7–3.1)
LYMPHOCYTES NFR BLD AUTO: 15.1 % (ref 19.6–45.3)
MAGNESIUM SERPL-MCNC: 2.2 MG/DL (ref 1.6–2.4)
MCH RBC QN AUTO: 31.1 PG (ref 26.6–33)
MCHC RBC AUTO-ENTMCNC: 32.6 G/DL (ref 31.5–35.7)
MCV RBC AUTO: 95.4 FL (ref 79–97)
MONOCYTES # BLD AUTO: 0.6 10*3/MM3 (ref 0.1–0.9)
MONOCYTES NFR BLD AUTO: 6.7 % (ref 5–12)
NEUTROPHILS NFR BLD AUTO: 6.6 10*3/MM3 (ref 1.7–7)
NEUTROPHILS NFR BLD AUTO: 73.6 % (ref 42.7–76)
NRBC BLD AUTO-RTO: 0 /100 WBC (ref 0–0.2)
PHOSPHATE SERPL-MCNC: 3.7 MG/DL (ref 2.5–4.5)
PLATELET # BLD AUTO: 278 10*3/MM3 (ref 140–450)
PMV BLD AUTO: 9.5 FL (ref 6–12)
POTASSIUM SERPL-SCNC: 3.5 MMOL/L (ref 3.5–5.2)
RBC # BLD AUTO: 4.6 10*6/MM3 (ref 4.14–5.8)
SODIUM SERPL-SCNC: 131 MMOL/L (ref 136–145)
WBC NRBC COR # BLD AUTO: 8.98 10*3/MM3 (ref 3.4–10.8)

## 2025-03-02 PROCEDURE — 94799 UNLISTED PULMONARY SVC/PX: CPT

## 2025-03-02 PROCEDURE — 83735 ASSAY OF MAGNESIUM: CPT | Performed by: STUDENT IN AN ORGANIZED HEALTH CARE EDUCATION/TRAINING PROGRAM

## 2025-03-02 PROCEDURE — 25010000002 KETOROLAC TROMETHAMINE PER 15 MG: Performed by: STUDENT IN AN ORGANIZED HEALTH CARE EDUCATION/TRAINING PROGRAM

## 2025-03-02 PROCEDURE — 25010000002 MORPHINE PER 10 MG: Performed by: STUDENT IN AN ORGANIZED HEALTH CARE EDUCATION/TRAINING PROGRAM

## 2025-03-02 PROCEDURE — 84100 ASSAY OF PHOSPHORUS: CPT | Performed by: STUDENT IN AN ORGANIZED HEALTH CARE EDUCATION/TRAINING PROGRAM

## 2025-03-02 PROCEDURE — 94761 N-INVAS EAR/PLS OXIMETRY MLT: CPT

## 2025-03-02 PROCEDURE — 94664 DEMO&/EVAL PT USE INHALER: CPT

## 2025-03-02 PROCEDURE — 80048 BASIC METABOLIC PNL TOTAL CA: CPT | Performed by: STUDENT IN AN ORGANIZED HEALTH CARE EDUCATION/TRAINING PROGRAM

## 2025-03-02 PROCEDURE — 85025 COMPLETE CBC W/AUTO DIFF WBC: CPT | Performed by: STUDENT IN AN ORGANIZED HEALTH CARE EDUCATION/TRAINING PROGRAM

## 2025-03-02 PROCEDURE — 94660 CPAP INITIATION&MGMT: CPT

## 2025-03-02 PROCEDURE — 99232 SBSQ HOSP IP/OBS MODERATE 35: CPT | Performed by: STUDENT IN AN ORGANIZED HEALTH CARE EDUCATION/TRAINING PROGRAM

## 2025-03-02 RX ADMIN — APIXABAN 5 MG: 5 TABLET, FILM COATED ORAL at 09:45

## 2025-03-02 RX ADMIN — APIXABAN 5 MG: 5 TABLET, FILM COATED ORAL at 21:13

## 2025-03-02 RX ADMIN — Medication 250 MG: at 09:45

## 2025-03-02 RX ADMIN — BUDESONIDE 0.5 MG: 0.5 INHALANT RESPIRATORY (INHALATION) at 19:41

## 2025-03-02 RX ADMIN — AMIODARONE HYDROCHLORIDE 200 MG: 200 TABLET ORAL at 09:45

## 2025-03-02 RX ADMIN — GABAPENTIN 800 MG: 400 CAPSULE ORAL at 05:50

## 2025-03-02 RX ADMIN — CLOPIDOGREL BISULFATE 75 MG: 75 TABLET ORAL at 09:45

## 2025-03-02 RX ADMIN — KETOROLAC TROMETHAMINE 15 MG: 15 INJECTION, SOLUTION INTRAMUSCULAR; INTRAVENOUS at 16:39

## 2025-03-02 RX ADMIN — ARFORMOTEROL TARTRATE 15 MCG: 15 SOLUTION RESPIRATORY (INHALATION) at 07:19

## 2025-03-02 RX ADMIN — HYDROCODONE BITARTRATE AND ACETAMINOPHEN 1 TABLET: 10; 325 TABLET ORAL at 18:23

## 2025-03-02 RX ADMIN — MORPHINE SULFATE 4 MG: 4 INJECTION, SOLUTION INTRAMUSCULAR; INTRAVENOUS at 21:13

## 2025-03-02 RX ADMIN — Medication 10 ML: at 09:45

## 2025-03-02 RX ADMIN — ARFORMOTEROL TARTRATE 15 MCG: 15 SOLUTION RESPIRATORY (INHALATION) at 19:41

## 2025-03-02 RX ADMIN — GABAPENTIN 800 MG: 400 CAPSULE ORAL at 14:49

## 2025-03-02 RX ADMIN — GABAPENTIN 800 MG: 400 CAPSULE ORAL at 21:13

## 2025-03-02 RX ADMIN — BUDESONIDE 0.5 MG: 0.5 INHALANT RESPIRATORY (INHALATION) at 07:19

## 2025-03-02 RX ADMIN — HYDROCODONE BITARTRATE AND ACETAMINOPHEN 1 TABLET: 10; 325 TABLET ORAL at 12:20

## 2025-03-02 RX ADMIN — SACUBITRIL AND VALSARTAN 1 TABLET: 49; 51 TABLET, FILM COATED ORAL at 21:13

## 2025-03-02 RX ADMIN — HYDROCODONE BITARTRATE AND ACETAMINOPHEN 1 TABLET: 10; 325 TABLET ORAL at 05:50

## 2025-03-02 RX ADMIN — SACUBITRIL AND VALSARTAN 1 TABLET: 49; 51 TABLET, FILM COATED ORAL at 09:45

## 2025-03-02 RX ADMIN — GUAIFENESIN 600 MG: 600 TABLET ORAL at 09:45

## 2025-03-02 RX ADMIN — Medication 250 MG: at 21:13

## 2025-03-02 RX ADMIN — PANTOPRAZOLE SODIUM 40 MG: 40 TABLET, DELAYED RELEASE ORAL at 09:45

## 2025-03-02 RX ADMIN — KETOROLAC TROMETHAMINE 15 MG: 15 INJECTION, SOLUTION INTRAMUSCULAR; INTRAVENOUS at 09:52

## 2025-03-02 RX ADMIN — CARVEDILOL 3.12 MG: 3.12 TABLET, FILM COATED ORAL at 18:23

## 2025-03-02 RX ADMIN — GUAIFENESIN 600 MG: 600 TABLET ORAL at 21:13

## 2025-03-02 RX ADMIN — CARVEDILOL 3.12 MG: 3.12 TABLET, FILM COATED ORAL at 09:45

## 2025-03-02 RX ADMIN — IPRATROPIUM BROMIDE AND ALBUTEROL SULFATE 3 ML: .5; 3 SOLUTION RESPIRATORY (INHALATION) at 07:19

## 2025-03-02 NOTE — PLAN OF CARE
Goal Outcome Evaluation:                    Patient is able to make needs known. He is fully oriented. He is calm and cooperative. Takes medications whole without difficulty. VSS.

## 2025-03-02 NOTE — THERAPY EVALUATION
RT EQUIPMENT DEVICE RELATED - SKIN ASSESSMENT    RT Medical Equipment/Device:     NIV Mask:  Under-the-nose   size:  .    Skin Assessment:      Cheek:  Intact  Chin:  Intact  Nose:  Intact    Device Skin Pressure Protection:  Positioning supports utilized    Nurse Notification:  Roseanna Brito, RRT

## 2025-03-02 NOTE — PLAN OF CARE
Goal Outcome Evaluation:Pt  did well with BIPAP after encouragement. At this time of note he has been on almost 4 hours @  14/6, 28%. He is RA to 1 L when not on BIPAP.

## 2025-03-02 NOTE — PLAN OF CARE
Goal Outcome Evaluation:              Outcome Evaluation: Patient VSS, oxygen saturations mid 90s on nasal cannula/bipap. Administered scheduled medications per MAR as well as PRN pain medication x3 for chronic neck pain. Alert and oriented x4 with intermitent confusion. No needs at this time. Plan of care ongoing.

## 2025-03-02 NOTE — PLAN OF CARE
Goal Outcome Evaluation:  Plan of Care Reviewed With: patient        Progress: no change  Outcome Evaluation: Patient wore bipap throughout the night. Unit is on standby in the room. Currently on 1L NC. Tolerated breathing treatrment and used flutter after.

## 2025-03-02 NOTE — PROGRESS NOTES
[facility]   Hospitalist Progress Note  Date: 3/2/2025  Patient Name: Sarah Osorio  : 1955  MRN: [id number]  Date of admission: 2025  Room/Bed: [facility]      Subjective   Subjective     Chief Complaint: Weakness, fall    Summary:Sarah Osorio is a 70 y.o. male with past medical history of hypertension, hyperlipidemia, diastolic CHF, A-fib on Eliquis, chronic back pain following injury, morbid obesity and GERD presented to the ED for evaluation of generalized weakness, dyspnea exertion, and fall.      Patient admitted for acute hypoxic respiratory failure. Labs showed leukocytosis, electrolyte imbalances, elevated proBNP and elevated but flat troponin. CT chest negative for PE but did show some right-sided bronchopneumonia.  Started on IV antibiotics for community-acquired pneumonia of unknown bacterial origin.  CT scan did show a 5.2 cm ascending aorta, echo with only mild enlargement of aorta and EF 61%, he will need outpatient vascular follow-up.  Patient developed worsening respiratory status and some hospital related delirium.  Respiratory PCR positive for COVID-19 and coronavirus strain.  He was initiated on p.o. Decadron, this was subsequently discontinued due to AMS/agitation.  Respiratory status improved, mental status improved.  Patient feeling more weak than baseline, pending rehab placement.    Interval Followup: No acute overnight events.  No acute distress.  Patient resting comfortably in bed.  No family at bedside today.  Patient denied PT yesterday, he reports that he was having some low back pain and trying to rest.  Encouraged him to please work with PT/OT as we need those evaluations to start his insurance approval for rehab.  He is agreeable.    Objective   Objective     Vitals:   Temp:  [97.7 °F (36.5 °C)-98.7 °F (37.1 °C)] 97.7 °F (36.5 °C)  Heart Rate:  [50-66] 66  Resp:  [18-20] 20  BP: (109-117)/(60-66) 109/60  Flow (L/min) (Oxygen Therapy):  [1] 1    Physical Exam   Gen: NAD,  Alert and Oriented  Cards: RRR  Pulm: Room air, no respiratory distress  Abd: soft, nondistended  Extremities: no pitting edema    Result Review    Result Review:  I have personally reviewed these results:  [x]  Laboratory      Lab 03/02/25  0547 03/01/25  0436 02/28/25  0424   WBC 8.98 10.95* 12.56*   HEMOGLOBIN 14.3 14.9 14.9   HEMATOCRIT 43.9 46.4 45.5   PLATELETS 278 311 307   NEUTROS ABS 6.60 8.85* 10.80*   IMMATURE GRANS (ABS) 0.13* 0.13* 0.11*   LYMPHS ABS 1.36 1.13 0.74   MONOS ABS 0.60 0.77 0.86   EOS ABS 0.26 0.05 0.00   MCV 95.4 96.3 95.8         Lab 03/02/25  0547 03/01/25  0436 02/28/25  0424   SODIUM 131* 133* 135*   POTASSIUM 3.5 3.4* 3.7   CHLORIDE 89* 88* 91*   CO2 35.3* 36.9* 36.2*   ANION GAP 6.7 8.1 7.8   BUN 30* 25* 22   CREATININE 0.84 0.96 0.82   EGFR 93.2 84.5 93.9   GLUCOSE 129* 213* 153*   CALCIUM 8.1* 8.8 8.7   MAGNESIUM 2.2 2.2 2.2   PHOSPHORUS 3.7 3.9 3.6                             Lab 02/27/25 2053 02/27/25  1028 02/26/25  2341   PH, ARTERIAL 7.471* 7.390 7.420   PCO2, ARTERIAL 60.6* 57.1* 61.8*   PO2 ART 69.5* 58.3* 56.8*   O2 SATURATION ART 94.0* 88.7* 88.5*   FIO2 36 28 28   HCO3 ART 44.3* 34.6* 40.1*   BASE EXCESS ART 16.2* 7.3* 12.3*     Brief Urine Lab Results  (Last result in the past 365 days)        Color   Clarity   Blood   Leuk Est   Nitrite   Protein   CREAT   Urine HCG        02/26/25 1328 Yellow   Clear   Negative   Negative   Negative   Negative                 [x]  Microbiology   Microbiology Results (last 10 days)       Procedure Component Value - Date/Time    S. Pneumo Ag Urine or CSF - Urine, Urine, Clean Catch [[id number]]  (Normal) Collected: 02/25/25 1630    Lab Status: Final result Specimen: Urine, Clean Catch Updated: 02/25/25 1726     Strep Pneumo Ag Negative    Legionella Antigen, Urine - Urine, Urine, Clean Catch [[id number]]  (Normal) Collected: 02/25/25 1630    Lab Status: Final result Specimen: Urine, Clean Catch Updated: 02/25/25 1726     LEGIONELLA  ANTIGEN, URINE Negative    Respiratory Panel PCR w/COVID-19(SARS-CoV-2) CAREN/TYRELL/ONEAL/PAD/COR/AMRITA In-House, NP Swab in UTM/VTM, 2 HR TAT - Swab, Nasopharynx [[id number]]  (Abnormal) Collected: 02/25/25 0919    Lab Status: Final result Specimen: Swab from Nasopharynx Updated: 02/25/25 1119     ADENOVIRUS, PCR Not Detected     Coronavirus 229E Not Detected     Coronavirus HKU1 Not Detected     Coronavirus NL63 Detected     Coronavirus OC43 Not Detected     COVID19 Detected     Human Metapneumovirus Not Detected     Human Rhinovirus/Enterovirus Not Detected     Influenza A PCR Not Detected     Influenza B PCR Not Detected     Parainfluenza Virus 1 Not Detected     Parainfluenza Virus 2 Not Detected     Parainfluenza Virus 3 Not Detected     Parainfluenza Virus 4 Not Detected     RSV, PCR Not Detected     Bordetella pertussis pcr Not Detected     Bordetella parapertussis PCR Not Detected     Chlamydophila pneumoniae PCR Not Detected     Mycoplasma pneumo by PCR Not Detected    Narrative:      In the setting of a positive respiratory panel with a viral infection PLUS a negative procalcitonin without other underlying concern for bacterial infection, consider observing off antibiotics or discontinuation of antibiotics and continue supportive care. If the respiratory panel is positive for atypical bacterial infection (Bordetella pertussis, Chlamydophila pneumoniae, or Mycoplasma pneumoniae), consider antibiotic de-escalation to target atypical bacterial infection.    Respiratory Culture - Sputum, Lung, Left Lower Lobe [[id number]] Collected: 02/22/25 2041    Lab Status: Final result Specimen: Sputum from Lung, Left Lower Lobe Updated: 02/25/25 1007     Respiratory Culture Moderate growth (3+) Normal respiratory makenzie. No S. aureus or Pseudomonas aeruginosa detected. Final report.     Gram Stain Many (4+) WBCs seen      Rare (1+) Epithelial cells seen    Blood Culture - Blood, Arm, Right [[id number]]  (Normal) Collected: 02/22/25  1915    Lab Status: Final result Specimen: Blood from Arm, Right Updated: 02/27/25 1931     Blood Culture No growth at 5 days    Narrative:      Less than seven (7) mL's of blood was collected.  Insufficient quantity may yield false negative results.    Blood Culture - Blood, Hand, Left [[id number]]  (Normal) Collected: 02/22/25 1910    Lab Status: Final result Specimen: Blood from Hand, Left Updated: 02/27/25 1931     Blood Culture No growth at 5 days    Narrative:      Less than seven (7) mL's of blood was collected.  Insufficient quantity may yield false negative results.    COVID PRE-OP / PRE-PROCEDURE SCREENING ORDER (NO ISOLATION) - Swab, Nasopharynx [[id number]]  (Normal) Collected: 02/22/25 1513    Lab Status: Final result Specimen: Swab from Nasopharynx Updated: 02/22/25 1600    Narrative:      The following orders were created for panel order COVID PRE-OP / PRE-PROCEDURE SCREENING ORDER (NO ISOLATION) - Swab, Nasopharynx.  Procedure                               Abnormality         Status                     ---------                               -----------         ------                     COVID-19, FLU A/B, RSV P...[[id number]]  Normal              Final result                 Please view results for these tests on the individual orders.    COVID-19, FLU A/B, RSV PCR 1 HR TAT - Swab, Nasopharynx [[id number]]  (Normal) Collected: 02/22/25 1513    Lab Status: Final result Specimen: Swab from Nasopharynx Updated: 02/22/25 1600     COVID19 Not Detected     Influenza A PCR Not Detected     Influenza B PCR Not Detected     RSV, PCR Not Detected    Narrative:      Fact sheet for providers: https://www.fda.gov/media/557854/download    Fact sheet for patients: https://www.fda.gov/media/794171/download    Test performed by PCR.          [x]  Radiology  CT Head Without Contrast    Result Date: 2/27/2025  Impression: 1.No definite acute intracranial findings. 2.Diffuse brain atrophy. 3.Extensive periventricular  hypodense areas compatible with chronic small vessel ischemia. Electronically Signed: Leandro Feliciano MD  2/27/2025 2:13 PM EST  Workstation ID: [id number]    XR Chest 1 View    Result Date: 2/25/2025  Impression: 1. Low lung volumes with bilateral perihilar airspace opacities likely representing combination of atelectasis and bronchopneumonia. Electronically Signed: Georges Packer  2/25/2025 3:50 PM EST  Workstation ID: [id number]    CT Chest With Contrast Diagnostic    Addendum Date: 2/22/2025    ADDENDUM:  Postoperative changes involve the cervical spine and are partially imaged on the study. There are degenerative changes throughout the imaged spine.   Portions of this note were completed with a voice recognition program.  2/22/2025 9:42 PM by Sampson Giordano MD on Workstation: [id number]      Result Date: 2/22/2025  1.  Grossly, no proximal (or central) no pulmonary embolism. 2.  There is fusiform dilatation of the ascending aorta, which measures about 5.2 cm greatest diameter. No thoracic aortic dissection is seen. 3.  Right-sided bronchopneumonia is suggested. 4.  Please see above comments for further detail.   Portions of this note were completed with a voice recognition program.  2/22/2025 9:39 PM by Sampson Giordano MD on Workstation: [id number]      CT Head Without Contrast    Result Date: 2/22/2025  Impression: No acute intracranial pathology. Electronically Signed: Sampson Brooks MD  2/22/2025 4:01 PM EST  Workstation ID: [id number]    XR Chest 1 View    Result Date: 2/22/2025  Impression: Hazy increased density over the right hemithorax may represent mild airspace disease. No dense consolidation or mass is seen. Electronically Signed: Alireza Flor MD  2/22/2025 3:13 PM EST  Workstation ID: [id number]   []  EKG/Telemetry   []  Cardiology/Vascular   []  Pathology  []  Old records  []  Other:    Assessment & Plan   Assessment / Plan     Assessment:  Acute hypoxic respiratory failure  Right side pneumonia, unknown bacterial  origin  COVID-19 positive  Generalized weakness secondary to above  HFpEF, not in acute exacerbation  Hyperlipidemia  Hypertension  A-fib on Eliquis  Leukocytosis  Sundowning/hospital related delirium -resolved  Ascending AAA, 5.2 cm    Plan:  Continue inpatient admission  Continue supplemental O2 as needed.  Continue to wean as tolerated.  Completed IV Rocephin and azithromycin  Brovana, Pulmicort, DuoNebs  Incentive spirometer, Acapella, chest vest  WBC normal today  Echo: EF 61%  Hold PO Lasix 40 mg twice daily  Continue Eliquis 5 mg twice daily  CT scan reviewed.  Right sided bronchopneumonia.  AAA 5.2 cm - will need vascular follow-up.  Suspect patient has underlying sleep apnea or obesity hypoventilation syndrome given that he is a chronic CO2 retainer.  Will refer him to sleep eval at discharge.  PT/OT  A.m. labs       Discussed with RN.    VTE Prophylaxis:  Pharmacologic VTE prophylaxis orders are present.        CODE STATUS:   Level Of Support Discussed With: Patient  Code Status (Patient has no pulse and is not breathing): CPR (Attempt to Resuscitate)  Medical Interventions (Patient has pulse or is breathing): Full Support      Electronically signed by Anahy Subramanian DO, 3/2/2025, 13:35 EST.

## 2025-03-03 LAB
ANION GAP SERPL CALCULATED.3IONS-SCNC: 4.3 MMOL/L (ref 5–15)
BUN SERPL-MCNC: 27 MG/DL (ref 8–23)
BUN/CREAT SERPL: 35.1 (ref 7–25)
CALCIUM SPEC-SCNC: 8.2 MG/DL (ref 8.6–10.5)
CHLORIDE SERPL-SCNC: 88 MMOL/L (ref 98–107)
CO2 SERPL-SCNC: 35.7 MMOL/L (ref 22–29)
CREAT SERPL-MCNC: 0.77 MG/DL (ref 0.76–1.27)
EGFRCR SERPLBLD CKD-EPI 2021: 95.7 ML/MIN/1.73
GLUCOSE SERPL-MCNC: 118 MG/DL (ref 65–99)
POTASSIUM SERPL-SCNC: 3.5 MMOL/L (ref 3.5–5.2)
SODIUM SERPL-SCNC: 128 MMOL/L (ref 136–145)
WHOLE BLOOD HOLD SPECIMEN: NORMAL

## 2025-03-03 PROCEDURE — 25810000003 SODIUM CHLORIDE 0.9 % SOLUTION: Performed by: STUDENT IN AN ORGANIZED HEALTH CARE EDUCATION/TRAINING PROGRAM

## 2025-03-03 PROCEDURE — 97110 THERAPEUTIC EXERCISES: CPT

## 2025-03-03 PROCEDURE — 97116 GAIT TRAINING THERAPY: CPT

## 2025-03-03 PROCEDURE — 99232 SBSQ HOSP IP/OBS MODERATE 35: CPT | Performed by: STUDENT IN AN ORGANIZED HEALTH CARE EDUCATION/TRAINING PROGRAM

## 2025-03-03 PROCEDURE — 94761 N-INVAS EAR/PLS OXIMETRY MLT: CPT

## 2025-03-03 PROCEDURE — 25010000002 MORPHINE PER 10 MG: Performed by: STUDENT IN AN ORGANIZED HEALTH CARE EDUCATION/TRAINING PROGRAM

## 2025-03-03 PROCEDURE — 94799 UNLISTED PULMONARY SVC/PX: CPT

## 2025-03-03 PROCEDURE — 97530 THERAPEUTIC ACTIVITIES: CPT

## 2025-03-03 PROCEDURE — 80048 BASIC METABOLIC PNL TOTAL CA: CPT | Performed by: STUDENT IN AN ORGANIZED HEALTH CARE EDUCATION/TRAINING PROGRAM

## 2025-03-03 PROCEDURE — 94664 DEMO&/EVAL PT USE INHALER: CPT

## 2025-03-03 PROCEDURE — 25010000002 KETOROLAC TROMETHAMINE PER 15 MG: Performed by: STUDENT IN AN ORGANIZED HEALTH CARE EDUCATION/TRAINING PROGRAM

## 2025-03-03 RX ADMIN — Medication 10 ML: at 21:34

## 2025-03-03 RX ADMIN — HYDROCODONE BITARTRATE AND ACETAMINOPHEN 1 TABLET: 10; 325 TABLET ORAL at 00:54

## 2025-03-03 RX ADMIN — GABAPENTIN 800 MG: 400 CAPSULE ORAL at 14:20

## 2025-03-03 RX ADMIN — Medication 250 MG: at 08:57

## 2025-03-03 RX ADMIN — GABAPENTIN 800 MG: 400 CAPSULE ORAL at 21:16

## 2025-03-03 RX ADMIN — APIXABAN 5 MG: 5 TABLET, FILM COATED ORAL at 08:57

## 2025-03-03 RX ADMIN — CARVEDILOL 3.12 MG: 3.12 TABLET, FILM COATED ORAL at 08:57

## 2025-03-03 RX ADMIN — CARVEDILOL 3.12 MG: 3.12 TABLET, FILM COATED ORAL at 17:10

## 2025-03-03 RX ADMIN — PANTOPRAZOLE SODIUM 40 MG: 40 TABLET, DELAYED RELEASE ORAL at 08:57

## 2025-03-03 RX ADMIN — APIXABAN 5 MG: 5 TABLET, FILM COATED ORAL at 21:16

## 2025-03-03 RX ADMIN — Medication 250 MG: at 21:16

## 2025-03-03 RX ADMIN — HYDROCODONE BITARTRATE AND ACETAMINOPHEN 1 TABLET: 10; 325 TABLET ORAL at 14:20

## 2025-03-03 RX ADMIN — CLOPIDOGREL BISULFATE 75 MG: 75 TABLET ORAL at 08:57

## 2025-03-03 RX ADMIN — Medication 10 ML: at 08:58

## 2025-03-03 RX ADMIN — IPRATROPIUM BROMIDE AND ALBUTEROL SULFATE 3 ML: .5; 3 SOLUTION RESPIRATORY (INHALATION) at 07:57

## 2025-03-03 RX ADMIN — GABAPENTIN 800 MG: 400 CAPSULE ORAL at 06:38

## 2025-03-03 RX ADMIN — HYDROCODONE BITARTRATE AND ACETAMINOPHEN 1 TABLET: 10; 325 TABLET ORAL at 06:38

## 2025-03-03 RX ADMIN — BUDESONIDE 0.5 MG: 0.5 INHALANT RESPIRATORY (INHALATION) at 07:57

## 2025-03-03 RX ADMIN — ARFORMOTEROL TARTRATE 15 MCG: 15 SOLUTION RESPIRATORY (INHALATION) at 19:50

## 2025-03-03 RX ADMIN — ARFORMOTEROL TARTRATE 15 MCG: 15 SOLUTION RESPIRATORY (INHALATION) at 07:57

## 2025-03-03 RX ADMIN — BUDESONIDE 0.5 MG: 0.5 INHALANT RESPIRATORY (INHALATION) at 19:50

## 2025-03-03 RX ADMIN — MORPHINE SULFATE 4 MG: 4 INJECTION, SOLUTION INTRAMUSCULAR; INTRAVENOUS at 17:10

## 2025-03-03 RX ADMIN — GUAIFENESIN 600 MG: 600 TABLET ORAL at 21:16

## 2025-03-03 RX ADMIN — GUAIFENESIN 600 MG: 600 TABLET ORAL at 08:57

## 2025-03-03 RX ADMIN — KETOROLAC TROMETHAMINE 15 MG: 15 INJECTION, SOLUTION INTRAMUSCULAR; INTRAVENOUS at 08:57

## 2025-03-03 RX ADMIN — HYDROCODONE BITARTRATE AND ACETAMINOPHEN 1 TABLET: 10; 325 TABLET ORAL at 21:16

## 2025-03-03 RX ADMIN — SACUBITRIL AND VALSARTAN 1 TABLET: 49; 51 TABLET, FILM COATED ORAL at 08:57

## 2025-03-03 RX ADMIN — SACUBITRIL AND VALSARTAN 1 TABLET: 49; 51 TABLET, FILM COATED ORAL at 21:16

## 2025-03-03 RX ADMIN — SODIUM CHLORIDE 1000 ML: 9 INJECTION, SOLUTION INTRAVENOUS at 08:57

## 2025-03-03 RX ADMIN — AMIODARONE HYDROCHLORIDE 200 MG: 200 TABLET ORAL at 08:57

## 2025-03-03 NOTE — PLAN OF CARE
Goal Outcome Evaluation:  Plan of Care Reviewed With: patient        Progress: improving  Outcome Evaluation: Patient compliant with BIPAP 14/6 rate 4 and fio2 .28 tonight. Patient is currently on 1L nasal cannuala.

## 2025-03-03 NOTE — PLAN OF CARE
Goal Outcome Evaluation:              Outcome Evaluation: Patient VSS, A&Ox4. Administered scheduled medications per MAR as well as PRN pain medication x2 for chronic neck pain. Patient did well on BiPap overnight, now wearing 1L nasal cannula. No needs at this time. Plan of care ongoing.

## 2025-03-03 NOTE — THERAPY TREATMENT NOTE
Acute Care - Physical Therapy Treatment Note   [place]     Patient Name: Sarah Osorio  : 1955  MRN: [id number]  Today's Date: 3/3/2025      Visit Dx:     ICD-10-CM ICD-9-CM   1. Fall, initial encounter  W19.XXXA E888.9   2. Weakness  R53.1 780.79   3. Pneumonia due to infectious organism, unspecified laterality, unspecified part of lung  J18.9 486   4. Congestive heart failure, unspecified HF chronicity, unspecified heart failure type  I50.9 428.0   5. Acute respiratory failure with hypoxia  J96.01 518.81   6. Difficulty in walking  R26.2 719.7   7. Decreased activities of daily living (ADL)  Z78.9 V49.89     Patient Active Problem List   Diagnosis    Seizure    Encephalopathy, toxic    Acute on chronic respiratory failure with hypoxia    Weakness    Fall    Pneumonia due to infectious organism    Congestive heart failure    HTN (hypertension)     Past Medical History:   Diagnosis Date    Abdominal aneurysm     Atrial fib/flutter, transient     CHF (congestive heart failure)     Hyperlipidemia     Hypertension     Injury of back      Past Surgical History:   Procedure Laterality Date    APPENDECTOMY      CARDIAC CATHETERIZATION      HERNIA REPAIR       PT Assessment (Last 12 Hours)       PT Evaluation and Treatment       Row Name 25 1613          Physical Therapy Time and Intention    Subjective Information complains of;fatigue  -SM     Document Type therapy note (daily note)  -SM     Mode of Treatment individual therapy;physical therapy  -SM     Patient Effort good  -SM     Symptoms Noted During/After Treatment fatigue  -SM       Row Name 25 1613          Bed Mobility    Supine-Sit Colony (Bed Mobility) contact guard  -SM     Bed Mobility, Safety Issues decreased use of arms for pushing/pulling;decreased use of legs for bridging/pushing  -SM     Assistive Device (Bed Mobility) bed rails;head of bed elevated  -SM       Row Name 25 1613          Gait/Stairs (Locomotion)     Gait/Stairs Locomotion gait/ambulation assistive device  -     Clay Level (Gait) contact guard;verbal cues  -     Assistive Device (Gait) walker, front-wheeled  -     Patient was able to Ambulate yes  -     Distance in Feet (Gait) 15  -     Pattern (Gait) 3-point;step-through  -     Deviations/Abnormal Patterns (Gait) base of support, wide;radha decreased;gait speed decreased;stride length decreased  -     Left Sided Gait Deviations weight shift ability decreased;heel strike decreased  -       Row Name 03/03/25 1613          Safety Issues/Impairments Affecting Functional Mobility    Impairments Affecting Function (Mobility) balance;endurance/activity tolerance;shortness of breath;strength  -       Row Name 03/03/25 1613          Balance    Balance Assessment standing dynamic balance  -     Dynamic Standing Balance contact guard;verbal cues  -     Position/Device Used, Standing Balance walker, front-wheeled  -       Row Name 03/03/25 1613          Motor Skills    Therapeutic Exercise hip;ankle;knee  10x ankle pumps, LAQ's, seated marches, Heel slides, Hip abd/add, glute sets  -       Row Name 03/03/25 1613          Positioning and Restraints    Pre-Treatment Position in bed  -     Post Treatment Position chair  -     In Chair reclined;call light within reach;encouraged to call for assist;exit alarm on;legs elevated  -       Row Name 03/03/25 1613          Progress Summary (PT)    Progress Toward Functional Goals (PT) progress toward functional goals is good  -               User Key  (r) = Recorded By, (t) = Taken By, (c) = Cosigned By      Initials Name Provider Type     Vanessa Woodward PTA Physical Therapist Assistant                    Physical Therapy Education       Title: PT OT SLP Therapies (In Progress)       Topic: Physical Therapy (Done)       Point: Mobility training (Done)       Learning Progress Summary            Patient Acceptance, E,TB, VU by TM at  2/26/2025 1133                      Point: Home exercise program (Done)       Learning Progress Summary            Patient Acceptance, E,TB, VU by TM at 2/26/2025 1133                      Point: Body mechanics (Done)       Learning Progress Summary            Patient Acceptance, E,TB, VU by TM at 2/26/2025 1133                      Point: Precautions (Done)       Learning Progress Summary            Patient Acceptance, E,TB, VU by TM at 2/26/2025 1133                                      User Key       Initials Effective Dates Name Provider Type [facility] 02/04/25 -  Ernie Bernardo, PT Student PT Student PT                  PT Recommendation and Plan     Progress Summary (PT)  Progress Toward Functional Goals (PT): progress toward functional goals is good   Outcome Measures       Row Name 03/03/25 1616             How much help from another person do you currently need...    Turning from your back to your side while in flat bed without using bedrails? 3  -SM      Moving from lying on back to sitting on the side of a flat bed without bedrails? 3  -SM      Moving to and from a bed to a chair (including a wheelchair)? 3  -SM      Standing up from a chair using your arms (e.g., wheelchair, bedside chair)? 3  -SM      Climbing 3-5 steps with a railing? 2  -SM      To walk in hospital room? 2  -SM      AM-PAC 6 Clicks Score (PT) 16  -SM                User Key  (r) = Recorded By, (t) = Taken By, (c) = Cosigned By      Initials Name Provider Type     Vanessa Woodward PTA Physical Therapist Assistant                     Time Calculation:    PT Charges       Row Name 03/03/25 1613             Time Calculation    PT Received On 03/03/25  -         Timed Charges    78572 - PT Therapeutic Exercise Minutes 10  -SM      90849 - Gait Training Minutes  10  -SM      97422 - PT Therapeutic Activity Minutes 20  -SM         Total Minutes    Timed Charges Total Minutes 40  -SM       Total Minutes 40  -SM                User Key   (r) = Recorded By, (t) = Taken By, (c) = Cosigned By      Initials Name Provider Type    Vanessa Dean PTA Physical Therapist Assistant                      PT G-Codes  Outcome Measure Options: AM-PAC 6 Clicks Daily Activity (OT), Optimal Instrument  AM-PAC 6 Clicks Score (PT): 16  AM-PAC 6 Clicks Score (OT): 12    Vanessa Woodward PTA  3/3/2025

## 2025-03-03 NOTE — PLAN OF CARE
Goal Outcome Evaluation:  Plan of Care Reviewed With: patient        Progress: no change  Outcome Evaluation: Patient wore bipap for about 4 hours last night. Unit is on standby in the room. Currently on 1L NC. Tolerated breathing treatment this morning.

## 2025-03-03 NOTE — PROGRESS NOTES
[facility]   Hospitalist Progress Note  Date: 3/3/2025  Patient Name: Sarah Osorio  : 1955  MRN: [id number]  Date of admission: 2025  Room/Bed: [facility]      Subjective   Subjective     Chief Complaint: Weakness, fall    Summary:Sarah Osorio is a 70 y.o. male with past medical history of hypertension, hyperlipidemia, diastolic CHF, A-fib on Eliquis, chronic back pain following injury, morbid obesity and GERD presented to the ED for evaluation of generalized weakness, dyspnea exertion, and fall.      Patient admitted for acute hypoxic respiratory failure. Labs showed leukocytosis, electrolyte imbalances, elevated proBNP and elevated but flat troponin. CT chest negative for PE but did show some right-sided bronchopneumonia.  Started on IV antibiotics for community-acquired pneumonia of unknown bacterial origin.  CT scan did show a 5.2 cm ascending aorta, echo with only mild enlargement of aorta and EF 61%, he will need outpatient vascular follow-up.  Patient developed worsening respiratory status and some hospital related delirium.  Respiratory PCR positive for COVID-19 and coronavirus strain.  He was initiated on p.o. Decadron, this was subsequently discontinued due to AMS/agitation.  Respiratory status improved, mental status improved.  Patient feeling more weak than baseline, pending rehab placement.    Interval Followup: No acute overnight events.  No acute distress.  Patient resting comfortably in bed.  Discussed care with him and his daughter who was on the phone.  Encouraged him to get up to the bedside chair today.  Waiting PT/OT eval.      Objective   Objective     Vitals:   Temp:  [97.5 °F (36.4 °C)-98.9 °F (37.2 °C)] 98.3 °F (36.8 °C)  Heart Rate:  [56-74] 67  Resp:  [16-20] 18  BP: ()/(62-78) 119/78  Flow (L/min) (Oxygen Therapy):  [1] 1    Physical Exam   Gen: NAD, Alert and Oriented  Cards: RRR  Pulm: Room air, no respiratory distress  Abd: soft, nondistended  Extremities: no  pitting edema    Result Review    Result Review:  I have personally reviewed these results:  [x]  Laboratory      Lab 03/02/25  0547 03/01/25  0436 02/28/25  0424   WBC 8.98 10.95* 12.56*   HEMOGLOBIN 14.3 14.9 14.9   HEMATOCRIT 43.9 46.4 45.5   PLATELETS 278 311 307   NEUTROS ABS 6.60 8.85* 10.80*   IMMATURE GRANS (ABS) 0.13* 0.13* 0.11*   LYMPHS ABS 1.36 1.13 0.74   MONOS ABS 0.60 0.77 0.86   EOS ABS 0.26 0.05 0.00   MCV 95.4 96.3 95.8         Lab 03/03/25  0542 03/02/25  0547 03/01/25 0436 02/28/25  0424   SODIUM 128* 131* 133* 135*   POTASSIUM 3.5 3.5 3.4* 3.7   CHLORIDE 88* 89* 88* 91*   CO2 35.7* 35.3* 36.9* 36.2*   ANION GAP 4.3* 6.7 8.1 7.8   BUN 27* 30* 25* 22   CREATININE 0.77 0.84 0.96 0.82   EGFR 95.7 93.2 84.5 93.9   GLUCOSE 118* 129* 213* 153*   CALCIUM 8.2* 8.1* 8.8 8.7   MAGNESIUM  --  2.2 2.2 2.2   PHOSPHORUS  --  3.7 3.9 3.6                             Lab 02/27/25 2053 02/27/25  1028 02/26/25  2341   PH, ARTERIAL 7.471* 7.390 7.420   PCO2, ARTERIAL 60.6* 57.1* 61.8*   PO2 ART 69.5* 58.3* 56.8*   O2 SATURATION ART 94.0* 88.7* 88.5*   FIO2 36 28 28   HCO3 ART 44.3* 34.6* 40.1*   BASE EXCESS ART 16.2* 7.3* 12.3*     Brief Urine Lab Results  (Last result in the past 365 days)        Color   Clarity   Blood   Leuk Est   Nitrite   Protein   CREAT   Urine HCG        02/26/25 1328 Yellow   Clear   Negative   Negative   Negative   Negative                 [x]  Microbiology   Microbiology Results (last 10 days)       Procedure Component Value - Date/Time    S. Pneumo Ag Urine or CSF - Urine, Urine, Clean Catch [[id number]]  (Normal) Collected: 02/25/25 1630    Lab Status: Final result Specimen: Urine, Clean Catch Updated: 02/25/25 1726     Strep Pneumo Ag Negative    Legionella Antigen, Urine - Urine, Urine, Clean Catch [[id number]]  (Normal) Collected: 02/25/25 1630    Lab Status: Final result Specimen: Urine, Clean Catch Updated: 02/25/25 1726     LEGIONELLA ANTIGEN, URINE Negative    Respiratory Panel  PCR w/COVID-19(SARS-CoV-2) CAREN/TYRELL/ONEAL/PAD/COR/AMRITA In-House, NP Swab in UTM/VTM, 2 HR TAT - Swab, Nasopharynx [[id number]]  (Abnormal) Collected: 02/25/25 0919    Lab Status: Final result Specimen: Swab from Nasopharynx Updated: 02/25/25 1119     ADENOVIRUS, PCR Not Detected     Coronavirus 229E Not Detected     Coronavirus HKU1 Not Detected     Coronavirus NL63 Detected     Coronavirus OC43 Not Detected     COVID19 Detected     Human Metapneumovirus Not Detected     Human Rhinovirus/Enterovirus Not Detected     Influenza A PCR Not Detected     Influenza B PCR Not Detected     Parainfluenza Virus 1 Not Detected     Parainfluenza Virus 2 Not Detected     Parainfluenza Virus 3 Not Detected     Parainfluenza Virus 4 Not Detected     RSV, PCR Not Detected     Bordetella pertussis pcr Not Detected     Bordetella parapertussis PCR Not Detected     Chlamydophila pneumoniae PCR Not Detected     Mycoplasma pneumo by PCR Not Detected    Narrative:      In the setting of a positive respiratory panel with a viral infection PLUS a negative procalcitonin without other underlying concern for bacterial infection, consider observing off antibiotics or discontinuation of antibiotics and continue supportive care. If the respiratory panel is positive for atypical bacterial infection (Bordetella pertussis, Chlamydophila pneumoniae, or Mycoplasma pneumoniae), consider antibiotic de-escalation to target atypical bacterial infection.    Respiratory Culture - Sputum, Lung, Left Lower Lobe [[id number]] Collected: 02/22/25 2041    Lab Status: Final result Specimen: Sputum from Lung, Left Lower Lobe Updated: 02/25/25 1007     Respiratory Culture Moderate growth (3+) Normal respiratory makenzie. No S. aureus or Pseudomonas aeruginosa detected. Final report.     Gram Stain Many (4+) WBCs seen      Rare (1+) Epithelial cells seen    Blood Culture - Blood, Arm, Right [[id number]]  (Normal) Collected: 02/22/25 1915    Lab Status: Final result Specimen:  Blood from Arm, Right Updated: 02/27/25 1931     Blood Culture No growth at 5 days    Narrative:      Less than seven (7) mL's of blood was collected.  Insufficient quantity may yield false negative results.    Blood Culture - Blood, Hand, Left [[id number]]  (Normal) Collected: 02/22/25 1910    Lab Status: Final result Specimen: Blood from Hand, Left Updated: 02/27/25 1931     Blood Culture No growth at 5 days    Narrative:      Less than seven (7) mL's of blood was collected.  Insufficient quantity may yield false negative results.    COVID PRE-OP / PRE-PROCEDURE SCREENING ORDER (NO ISOLATION) - Swab, Nasopharynx [[id number]]  (Normal) Collected: 02/22/25 1513    Lab Status: Final result Specimen: Swab from Nasopharynx Updated: 02/22/25 1600    Narrative:      The following orders were created for panel order COVID PRE-OP / PRE-PROCEDURE SCREENING ORDER (NO ISOLATION) - Swab, Nasopharynx.  Procedure                               Abnormality         Status                     ---------                               -----------         ------                     COVID-19, FLU A/B, RSV P...[[id number]]  Normal              Final result                 Please view results for these tests on the individual orders.    COVID-19, FLU A/B, RSV PCR 1 HR TAT - Swab, Nasopharynx [[id number]]  (Normal) Collected: 02/22/25 1513    Lab Status: Final result Specimen: Swab from Nasopharynx Updated: 02/22/25 1600     COVID19 Not Detected     Influenza A PCR Not Detected     Influenza B PCR Not Detected     RSV, PCR Not Detected    Narrative:      Fact sheet for providers: https://www.fda.gov/media/082690/download    Fact sheet for patients: https://www.fda.gov/media/509627/download    Test performed by PCR.          [x]  Radiology  CT Head Without Contrast    Result Date: 2/27/2025  Impression: 1.No definite acute intracranial findings. 2.Diffuse brain atrophy. 3.Extensive periventricular hypodense areas compatible with chronic small  vessel ischemia. Electronically Signed: Leandro Feliciano MD  2/27/2025 2:13 PM EST  Workstation ID: [id number]    XR Chest 1 View    Result Date: 2/25/2025  Impression: 1. Low lung volumes with bilateral perihilar airspace opacities likely representing combination of atelectasis and bronchopneumonia. Electronically Signed: Georges Packer  2/25/2025 3:50 PM EST  Workstation ID: [id number]   []  EKG/Telemetry   []  Cardiology/Vascular   []  Pathology  []  Old records  []  Other:    Assessment & Plan   Assessment / Plan     Assessment:  Acute hypoxic respiratory failure  Right side pneumonia, unknown bacterial origin  COVID-19 positive  Generalized weakness secondary to above  HFpEF, not in acute exacerbation  Hyperlipidemia  Hypertension  A-fib on Eliquis  Leukocytosis  Sundowning/hospital related delirium -resolved  Ascending AAA, 5.2 cm    Plan:  Continue inpatient admission  Continue supplemental O2 as needed.  Continue to wean as tolerated.  Completed IV Rocephin and azithromycin  Brovana, Pulmicort, DuoNebs  Incentive spirometer, Acapella, chest vest  WBC normal today  Echo: EF 61%  Hold PO Lasix 40 mg twice daily  Continue Eliquis 5 mg twice daily  CT scan reviewed.  Right sided bronchopneumonia.  AAA 5.2 cm - will need vascular follow-up.  Suspect patient has underlying sleep apnea or obesity hypoventilation syndrome given that he is a chronic CO2 retainer.  Will refer him to sleep eval at discharge.  Sodium 128, chloride 88.  Looks little dry we will give small amount of fluids today.  PT/OT  A.m. labs       Discussed with RN.    VTE Prophylaxis:  Pharmacologic VTE prophylaxis orders are present.        CODE STATUS:   Level Of Support Discussed With: Patient  Code Status (Patient has no pulse and is not breathing): CPR (Attempt to Resuscitate)  Medical Interventions (Patient has pulse or is breathing): Full Support      Electronically signed by Anahy Subramanian DO, 3/3/2025, 13:25 EST.

## 2025-03-04 LAB
ANION GAP SERPL CALCULATED.3IONS-SCNC: 6.8 MMOL/L (ref 5–15)
BASOPHILS # BLD AUTO: 0.04 10*3/MM3 (ref 0–0.2)
BASOPHILS NFR BLD AUTO: 0.3 % (ref 0–1.5)
BUN SERPL-MCNC: 19 MG/DL (ref 8–23)
BUN/CREAT SERPL: 21.1 (ref 7–25)
CALCIUM SPEC-SCNC: 8.8 MG/DL (ref 8.6–10.5)
CHLORIDE SERPL-SCNC: 94 MMOL/L (ref 98–107)
CO2 SERPL-SCNC: 35.2 MMOL/L (ref 22–29)
CREAT SERPL-MCNC: 0.9 MG/DL (ref 0.76–1.27)
DEPRECATED RDW RBC AUTO: 44.2 FL (ref 37–54)
EGFRCR SERPLBLD CKD-EPI 2021: 91.3 ML/MIN/1.73
EOSINOPHIL # BLD AUTO: 0.55 10*3/MM3 (ref 0–0.4)
EOSINOPHIL NFR BLD AUTO: 4.5 % (ref 0.3–6.2)
ERYTHROCYTE [DISTWIDTH] IN BLOOD BY AUTOMATED COUNT: 12.7 % (ref 12.3–15.4)
GLUCOSE SERPL-MCNC: 136 MG/DL (ref 65–99)
HCT VFR BLD AUTO: 44.3 % (ref 37.5–51)
HGB BLD-MCNC: 14.1 G/DL (ref 13–17.7)
IMM GRANULOCYTES # BLD AUTO: 0.14 10*3/MM3 (ref 0–0.05)
IMM GRANULOCYTES NFR BLD AUTO: 1.2 % (ref 0–0.5)
LYMPHOCYTES # BLD AUTO: 1.16 10*3/MM3 (ref 0.7–3.1)
LYMPHOCYTES NFR BLD AUTO: 9.6 % (ref 19.6–45.3)
MCH RBC QN AUTO: 30.3 PG (ref 26.6–33)
MCHC RBC AUTO-ENTMCNC: 31.8 G/DL (ref 31.5–35.7)
MCV RBC AUTO: 95.3 FL (ref 79–97)
MONOCYTES # BLD AUTO: 0.71 10*3/MM3 (ref 0.1–0.9)
MONOCYTES NFR BLD AUTO: 5.9 % (ref 5–12)
NEUTROPHILS NFR BLD AUTO: 78.5 % (ref 42.7–76)
NEUTROPHILS NFR BLD AUTO: 9.53 10*3/MM3 (ref 1.7–7)
NRBC BLD AUTO-RTO: 0 /100 WBC (ref 0–0.2)
PLATELET # BLD AUTO: 283 10*3/MM3 (ref 140–450)
PMV BLD AUTO: 9.7 FL (ref 6–12)
POTASSIUM SERPL-SCNC: 3.9 MMOL/L (ref 3.5–5.2)
RBC # BLD AUTO: 4.65 10*6/MM3 (ref 4.14–5.8)
SODIUM SERPL-SCNC: 136 MMOL/L (ref 136–145)
WBC NRBC COR # BLD AUTO: 12.13 10*3/MM3 (ref 3.4–10.8)

## 2025-03-04 PROCEDURE — 94799 UNLISTED PULMONARY SVC/PX: CPT

## 2025-03-04 PROCEDURE — 25010000002 HALOPERIDOL LACTATE PER 5 MG: Performed by: STUDENT IN AN ORGANIZED HEALTH CARE EDUCATION/TRAINING PROGRAM

## 2025-03-04 PROCEDURE — 99232 SBSQ HOSP IP/OBS MODERATE 35: CPT | Performed by: INTERNAL MEDICINE

## 2025-03-04 PROCEDURE — 25010000002 KETOROLAC TROMETHAMINE PER 15 MG: Performed by: STUDENT IN AN ORGANIZED HEALTH CARE EDUCATION/TRAINING PROGRAM

## 2025-03-04 PROCEDURE — 94660 CPAP INITIATION&MGMT: CPT

## 2025-03-04 PROCEDURE — 94664 DEMO&/EVAL PT USE INHALER: CPT

## 2025-03-04 PROCEDURE — 80048 BASIC METABOLIC PNL TOTAL CA: CPT | Performed by: STUDENT IN AN ORGANIZED HEALTH CARE EDUCATION/TRAINING PROGRAM

## 2025-03-04 PROCEDURE — 25010000002 MORPHINE PER 10 MG: Performed by: STUDENT IN AN ORGANIZED HEALTH CARE EDUCATION/TRAINING PROGRAM

## 2025-03-04 PROCEDURE — 94761 N-INVAS EAR/PLS OXIMETRY MLT: CPT

## 2025-03-04 PROCEDURE — 85025 COMPLETE CBC W/AUTO DIFF WBC: CPT | Performed by: STUDENT IN AN ORGANIZED HEALTH CARE EDUCATION/TRAINING PROGRAM

## 2025-03-04 RX ADMIN — SACUBITRIL AND VALSARTAN 1 TABLET: 49; 51 TABLET, FILM COATED ORAL at 11:09

## 2025-03-04 RX ADMIN — Medication 250 MG: at 09:13

## 2025-03-04 RX ADMIN — SACUBITRIL AND VALSARTAN 1 TABLET: 49; 51 TABLET, FILM COATED ORAL at 20:36

## 2025-03-04 RX ADMIN — BUDESONIDE 0.5 MG: 0.5 INHALANT RESPIRATORY (INHALATION) at 06:47

## 2025-03-04 RX ADMIN — GUAIFENESIN 600 MG: 600 TABLET ORAL at 09:13

## 2025-03-04 RX ADMIN — GABAPENTIN 800 MG: 400 CAPSULE ORAL at 05:43

## 2025-03-04 RX ADMIN — IPRATROPIUM BROMIDE AND ALBUTEROL SULFATE 3 ML: .5; 3 SOLUTION RESPIRATORY (INHALATION) at 06:47

## 2025-03-04 RX ADMIN — HYDROCODONE BITARTRATE AND ACETAMINOPHEN 1 TABLET: 10; 325 TABLET ORAL at 22:33

## 2025-03-04 RX ADMIN — AMIODARONE HYDROCHLORIDE 200 MG: 200 TABLET ORAL at 09:13

## 2025-03-04 RX ADMIN — Medication 10 ML: at 09:13

## 2025-03-04 RX ADMIN — HYDROCODONE BITARTRATE AND ACETAMINOPHEN 1 TABLET: 10; 325 TABLET ORAL at 11:18

## 2025-03-04 RX ADMIN — HYDROCODONE BITARTRATE AND ACETAMINOPHEN 1 TABLET: 10; 325 TABLET ORAL at 03:30

## 2025-03-04 RX ADMIN — ARFORMOTEROL TARTRATE 15 MCG: 15 SOLUTION RESPIRATORY (INHALATION) at 06:47

## 2025-03-04 RX ADMIN — Medication 250 MG: at 20:36

## 2025-03-04 RX ADMIN — MORPHINE SULFATE 4 MG: 4 INJECTION, SOLUTION INTRAMUSCULAR; INTRAVENOUS at 04:50

## 2025-03-04 RX ADMIN — CARVEDILOL 3.12 MG: 3.12 TABLET, FILM COATED ORAL at 09:13

## 2025-03-04 RX ADMIN — GUAIFENESIN 600 MG: 600 TABLET ORAL at 20:36

## 2025-03-04 RX ADMIN — GABAPENTIN 800 MG: 400 CAPSULE ORAL at 13:46

## 2025-03-04 RX ADMIN — ARFORMOTEROL TARTRATE 15 MCG: 15 SOLUTION RESPIRATORY (INHALATION) at 18:41

## 2025-03-04 RX ADMIN — PANTOPRAZOLE SODIUM 40 MG: 40 TABLET, DELAYED RELEASE ORAL at 09:13

## 2025-03-04 RX ADMIN — GABAPENTIN 800 MG: 400 CAPSULE ORAL at 22:33

## 2025-03-04 RX ADMIN — BUDESONIDE 0.5 MG: 0.5 INHALANT RESPIRATORY (INHALATION) at 18:41

## 2025-03-04 RX ADMIN — KETOROLAC TROMETHAMINE 15 MG: 15 INJECTION, SOLUTION INTRAMUSCULAR; INTRAVENOUS at 01:56

## 2025-03-04 RX ADMIN — Medication 10 ML: at 20:36

## 2025-03-04 RX ADMIN — CLOPIDOGREL BISULFATE 75 MG: 75 TABLET ORAL at 09:13

## 2025-03-04 RX ADMIN — HALOPERIDOL LACTATE 1 MG: 5 INJECTION, SOLUTION INTRAMUSCULAR at 22:34

## 2025-03-04 RX ADMIN — APIXABAN 5 MG: 5 TABLET, FILM COATED ORAL at 20:36

## 2025-03-04 RX ADMIN — APIXABAN 5 MG: 5 TABLET, FILM COATED ORAL at 09:13

## 2025-03-04 NOTE — PLAN OF CARE
Goal Outcome Evaluation:              Outcome Evaluation: pt alert and oriented, with intm. Confusion. No s/s of distress. Complaints of pain managed per MAR. will continue with pt plan of care.

## 2025-03-04 NOTE — PROGRESS NOTES
RT EQUIPMENT DEVICE RELATED - SKIN ASSESSMENT    RT Medical Equipment/Device:     NIV Mask:  Full-face    size:      Skin Assessment:      Cheek:  Intact  Chin:  Intact  Forehead:  Intact  Nose:  Intact  Mouth:  Intact    Device Skin Pressure Protection:  Pressure points protected    Nurse Notification:  Roseanna Ray, RRT

## 2025-03-04 NOTE — NURSING NOTE
Patient A&Ox4, VSS. Administered scheduled medications per MAR as well as PRN pain medicaion x1 for chronic neck pain. Patient transferring to 4MTU, report called to ZARI Guzman.  No needs at this time. Plan of care ongoing.

## 2025-03-04 NOTE — PLAN OF CARE
Goal Outcome Evaluation:  Plan of Care Reviewed With: patient           Outcome Evaluation: Patient on room air. Bipap on standby at bedside.

## 2025-03-04 NOTE — PLAN OF CARE
Goal Outcome Evaluation:              Outcome Evaluation: Patient  is A&Ox4, VSS.  Pain treated per MAR as well as PRN pain medicaion x1 for chronic neck pain. Patient admitted to 4MTU,  No needs at this time. Plan of care ongoing.

## 2025-03-04 NOTE — PROGRESS NOTES
[facility]   Hospitalist Progress Note  Date: 3/4/2025  Patient Name: Sarah Osorio  : 1955  MRN: [id number]  Date of admission: 2025  Room/Bed: 405/1      Subjective   Subjective     Chief Complaint: Weakness, fall    Summary:  Sarah Osorio is a 70 y.o. male with past medical history of hypertension, hyperlipidemia, diastolic CHF, A-fib on Eliquis, chronic back pain following injury, morbid obesity and GERD presented to the ED for evaluation of generalized weakness, dyspnea exertion, and fall.      Patient admitted for acute hypoxic respiratory failure. Labs showed leukocytosis, electrolyte imbalances, elevated proBNP and elevated but flat troponin. CT chest negative for PE but did show some right-sided bronchopneumonia.  Started on IV antibiotics for community-acquired pneumonia of unknown bacterial origin.  CT scan did show a 5.2 cm ascending aorta, echo with only mild enlargement of aorta and EF 61%, he will need outpatient vascular follow-up.  Patient developed worsening respiratory status and some hospital related delirium.  Respiratory PCR positive for COVID-19 and coronavirus strain.  He was initiated on p.o. Decadron, this was subsequently discontinued due to AMS/agitation.  Respiratory status improved, mental status improved.  Patient feeling more weak than baseline, pending rehab placement.    Interval Followup:   No issues overnight.  Patient resting comfortably in bed on rounds this morning.  Discussed with case management likely discharge tomorrow.  Patient documented on room air, on rounds on 1 L.      Objective   Objective     Vitals:   Temp:  [98.1 °F (36.7 °C)-98.6 °F (37 °C)] 98.1 °F (36.7 °C)  Heart Rate:  [50-77] 52  Resp:  [16-22] 22  BP: (104-142)/(61-80) 115/73  Flow (L/min) (Oxygen Therapy):  [1] 1    Physical Exam   Gen: NAD, Alert and Oriented  Cards: RRR  Pulm: 1L NC, no respiratory distress  Abd: soft, nondistended  Extremities: no pitting edema    Result Review     Result Review:  I have personally reviewed these results:  [x]  Laboratory      Lab 03/04/25 0447 03/02/25  0547 03/01/25  0436   WBC 12.13* 8.98 10.95*   HEMOGLOBIN 14.1 14.3 14.9   HEMATOCRIT 44.3 43.9 46.4   PLATELETS 283 278 311   NEUTROS ABS 9.53* 6.60 8.85*   IMMATURE GRANS (ABS) 0.14* 0.13* 0.13*   LYMPHS ABS 1.16 1.36 1.13   MONOS ABS 0.71 0.60 0.77   EOS ABS 0.55* 0.26 0.05   MCV 95.3 95.4 96.3         Lab 03/04/25 0448 03/03/25  0542 03/02/25  0547 03/01/25 0436 02/28/25  0424   SODIUM 136 128* 131* 133* 135*   POTASSIUM 3.9 3.5 3.5 3.4* 3.7   CHLORIDE 94* 88* 89* 88* 91*   CO2 35.2* 35.7* 35.3* 36.9* 36.2*   ANION GAP 6.8 4.3* 6.7 8.1 7.8   BUN 19 27* 30* 25* 22   CREATININE 0.90 0.77 0.84 0.96 0.82   EGFR 91.3 95.7 93.2 84.5 93.9   GLUCOSE 136* 118* 129* 213* 153*   CALCIUM 8.8 8.2* 8.1* 8.8 8.7   MAGNESIUM  --   --  2.2 2.2 2.2   PHOSPHORUS  --   --  3.7 3.9 3.6                             Lab 02/27/25 2053 02/27/25  1028 02/26/25  2341   PH, ARTERIAL 7.471* 7.390 7.420   PCO2, ARTERIAL 60.6* 57.1* 61.8*   PO2 ART 69.5* 58.3* 56.8*   O2 SATURATION ART 94.0* 88.7* 88.5*   FIO2 36 28 28   HCO3 ART 44.3* 34.6* 40.1*   BASE EXCESS ART 16.2* 7.3* 12.3*     Brief Urine Lab Results  (Last result in the past 365 days)        Color   Clarity   Blood   Leuk Est   Nitrite   Protein   CREAT   Urine HCG        02/26/25 1328 Yellow   Clear   Negative   Negative   Negative   Negative                 [x]  Microbiology   Microbiology Results (last 10 days)       Procedure Component Value - Date/Time    S. Pneumo Ag Urine or CSF - Urine, Urine, Clean Catch [[id number]]  (Normal) Collected: 02/25/25 1630    Lab Status: Final result Specimen: Urine, Clean Catch Updated: 02/25/25 1726     Strep Pneumo Ag Negative    Legionella Antigen, Urine - Urine, Urine, Clean Catch [[id number]]  (Normal) Collected: 02/25/25 1630    Lab Status: Final result Specimen: Urine, Clean Catch Updated: 02/25/25 1726     LEGIONELLA ANTIGEN,  URINE Negative    Respiratory Panel PCR w/COVID-19(SARS-CoV-2) [facility]/TYRELL/ONEAL/PAD/COR/[facility]RITA In-House, NP Swab in UTM/VTM, 2 HR TAT - Swab, Nasopharynx [[id number]]  (Abnormal) Collected: 02/25/25 0919    Lab Status: Final result Specimen: Swab from Nasopharynx Updated: 02/25/25 1119     ADENOVIRUS, PCR Not Detected     Coronavirus 229E Not Detected     Coronavirus HKU1 Not Detected     Coronavirus NL63 Detected     Coronavirus OC43 Not Detected     COVID19 Detected     Human Metapneumovirus Not Detected     Human Rhinovirus/Enterovirus Not Detected     Influenza A PCR Not Detected     Influenza B PCR Not Detected     Parainfluenza Virus 1 Not Detected     Parainfluenza Virus 2 Not Detected     Parainfluenza Virus 3 Not Detected     Parainfluenza Virus 4 Not Detected     RSV, PCR Not Detected     Bordetella pertussis pcr Not Detected     Bordetella parapertussis PCR Not Detected     Chlamydophila pneumoniae PCR Not Detected     Mycoplasma pneumo by PCR Not Detected    Narrative:      In the setting of a positive respiratory panel with a viral infection PLUS a negative procalcitonin without other underlying concern for bacterial infection, consider observing off antibiotics or discontinuation of antibiotics and continue supportive care. If the respiratory panel is positive for atypical bacterial infection (Bordetella pertussis, Chlamydophila pneumoniae, or Mycoplasma pneumoniae), consider antibiotic de-escalation to target atypical bacterial infection.    Respiratory Culture - Sputum, Lung, Left Lower Lobe [[id number]] Collected: 02/22/25 2041    Lab Status: Final result Specimen: Sputum from Lung, Left Lower Lobe Updated: 02/25/25 1007     Respiratory Culture Moderate growth (3+) Normal respiratory makenzie. No S. aureus or Pseudomonas aeruginosa detected. Final report.     Gram Stain Many (4+) WBCs seen      Rare (1+) Epithelial cells seen    Blood Culture - Blood, Arm, Right [[id number]]  (Normal) Collected: 02/22/25 1915     Lab Status: Final result Specimen: Blood from Arm, Right Updated: 02/27/25 1931     Blood Culture No growth at 5 days    Narrative:      Less than seven (7) mL's of blood was collected.  Insufficient quantity may yield false negative results.    Blood Culture - Blood, Hand, Left [[id number]]  (Normal) Collected: 02/22/25 1910    Lab Status: Final result Specimen: Blood from Hand, Left Updated: 02/27/25 1931     Blood Culture No growth at 5 days    Narrative:      Less than seven (7) mL's of blood was collected.  Insufficient quantity may yield false negative results.          [x]  Radiology  CT Head Without Contrast    Result Date: 2/27/2025  Impression: 1.No definite acute intracranial findings. 2.Diffuse brain atrophy. 3.Extensive periventricular hypodense areas compatible with chronic small vessel ischemia. Electronically Signed: Leandro Feliciano MD  2/27/2025 2:13 PM EST  Workstation ID: [id number]    XR Chest 1 View    Result Date: 2/25/2025  Impression: 1. Low lung volumes with bilateral perihilar airspace opacities likely representing combination of atelectasis and bronchopneumonia. Electronically Signed: Georges Packer  2/25/2025 3:50 PM EST  Workstation ID: [id number]   []  EKG/Telemetry   []  Cardiology/Vascular   []  Pathology  []  Old records  []  Other:    Assessment & Plan   Assessment / Plan     Assessment:  Acute hypoxic respiratory failure  Right side pneumonia, unknown bacterial origin  COVID-19 positive  Generalized weakness secondary to above  HFpEF, not in acute exacerbation  Hyperlipidemia  Hypertension  A-fib on Eliquis  Leukocytosis  Sundowning/hospital related delirium -resolved  Ascending AAA, 5.2 cm    Plan:  Continue inpatient admission  Continue supplemental O2 as needed.  Continue to wean as tolerated.  Completed IV Rocephin and azithromycin  Brovana, Pulmicort, DuoNebs  Incentive spirometer, Acapella, chest vest  WBC up to 12 today, continue to monitor  Echo: EF 61%  Hold PO Lasix 40 mg  twice daily  Continue Eliquis 5 mg twice daily  CT scan reviewed.  Right sided bronchopneumonia.  AAA 5.2 cm - will need vascular follow-up.  Suspect patient has underlying sleep apnea or obesity hypoventilation syndrome given that he is a chronic CO2 retainer.  Will refer him to sleep eval at discharge.  Odium and chloride improved today following bolus of IV fluids yesterday, continue to hold today.  Repeat labs in the morning  PT/OT  A.m. labs       Discussed with RN.    VTE Prophylaxis:  Pharmacologic VTE prophylaxis orders are present.        CODE STATUS:   Level Of Support Discussed With: Patient  Code Status (Patient has no pulse and is not breathing): CPR (Attempt to Resuscitate)  Medical Interventions (Patient has pulse or is breathing): Full Support      Electronically signed by Alfredo Oliver MD, 3/4/2025, 16:56 EST.

## 2025-03-04 NOTE — THERAPY EVALUATION
RT EQUIPMENT DEVICE RELATED - SKIN ASSESSMENT    RT Medical Equipment/Device:     NIV Mask:  Full-face    size:      Skin Assessment:      Cheek:  Intact  Chin:  Intact  Nares:  Intact  Neck:  Intact  Mouth:  Intact    Device Skin Pressure Protection:  Pressure points protected    Nurse Notification:  Roseanna Cruz, RRT

## 2025-03-04 NOTE — PLAN OF CARE
Goal Outcome Evaluation:      Bipap placed on at 0111 but only worn shortly. Patient refused to put mask back on. Will continue to offer Bipap.

## 2025-03-05 LAB
ANION GAP SERPL CALCULATED.3IONS-SCNC: 3.6 MMOL/L (ref 5–15)
BUN SERPL-MCNC: 14 MG/DL (ref 8–23)
BUN/CREAT SERPL: 16.9 (ref 7–25)
CALCIUM SPEC-SCNC: 8.5 MG/DL (ref 8.6–10.5)
CHLORIDE SERPL-SCNC: 99 MMOL/L (ref 98–107)
CO2 SERPL-SCNC: 33.4 MMOL/L (ref 22–29)
CREAT SERPL-MCNC: 0.83 MG/DL (ref 0.76–1.27)
DEPRECATED RDW RBC AUTO: 45.6 FL (ref 37–54)
EGFRCR SERPLBLD CKD-EPI 2021: 93.6 ML/MIN/1.73
ERYTHROCYTE [DISTWIDTH] IN BLOOD BY AUTOMATED COUNT: 12.8 % (ref 12.3–15.4)
GLUCOSE SERPL-MCNC: 137 MG/DL (ref 65–99)
HCT VFR BLD AUTO: 40.8 % (ref 37.5–51)
HGB BLD-MCNC: 13.1 G/DL (ref 13–17.7)
MAGNESIUM SERPL-MCNC: 2.5 MG/DL (ref 1.6–2.4)
MCH RBC QN AUTO: 31.2 PG (ref 26.6–33)
MCHC RBC AUTO-ENTMCNC: 32.1 G/DL (ref 31.5–35.7)
MCV RBC AUTO: 97.1 FL (ref 79–97)
PLATELET # BLD AUTO: 285 10*3/MM3 (ref 140–450)
PMV BLD AUTO: 9.6 FL (ref 6–12)
POTASSIUM SERPL-SCNC: 4.2 MMOL/L (ref 3.5–5.2)
RBC # BLD AUTO: 4.2 10*6/MM3 (ref 4.14–5.8)
SODIUM SERPL-SCNC: 136 MMOL/L (ref 136–145)
WBC NRBC COR # BLD AUTO: 9.24 10*3/MM3 (ref 3.4–10.8)

## 2025-03-05 PROCEDURE — 94799 UNLISTED PULMONARY SVC/PX: CPT

## 2025-03-05 PROCEDURE — 83735 ASSAY OF MAGNESIUM: CPT | Performed by: INTERNAL MEDICINE

## 2025-03-05 PROCEDURE — 99232 SBSQ HOSP IP/OBS MODERATE 35: CPT | Performed by: INTERNAL MEDICINE

## 2025-03-05 PROCEDURE — 97116 GAIT TRAINING THERAPY: CPT

## 2025-03-05 PROCEDURE — 94761 N-INVAS EAR/PLS OXIMETRY MLT: CPT

## 2025-03-05 PROCEDURE — 85027 COMPLETE CBC AUTOMATED: CPT | Performed by: INTERNAL MEDICINE

## 2025-03-05 PROCEDURE — 94660 CPAP INITIATION&MGMT: CPT

## 2025-03-05 PROCEDURE — 97530 THERAPEUTIC ACTIVITIES: CPT

## 2025-03-05 PROCEDURE — 94664 DEMO&/EVAL PT USE INHALER: CPT

## 2025-03-05 PROCEDURE — 80048 BASIC METABOLIC PNL TOTAL CA: CPT | Performed by: INTERNAL MEDICINE

## 2025-03-05 RX ORDER — GUAIFENESIN 600 MG/1
600 TABLET, EXTENDED RELEASE ORAL EVERY 12 HOURS SCHEDULED
Qty: 20 TABLET | Refills: 0 | Status: SHIPPED | OUTPATIENT
Start: 2025-03-05 | End: 2025-03-15

## 2025-03-05 RX ORDER — SACCHAROMYCES BOULARDII 250 MG
250 CAPSULE ORAL 2 TIMES DAILY
Qty: 40 CAPSULE | Refills: 0 | Status: SHIPPED | OUTPATIENT
Start: 2025-03-05 | End: 2025-03-25

## 2025-03-05 RX ADMIN — HYDROCODONE BITARTRATE AND ACETAMINOPHEN 1 TABLET: 10; 325 TABLET ORAL at 05:27

## 2025-03-05 RX ADMIN — SACUBITRIL AND VALSARTAN 1 TABLET: 49; 51 TABLET, FILM COATED ORAL at 21:05

## 2025-03-05 RX ADMIN — Medication 250 MG: at 08:40

## 2025-03-05 RX ADMIN — SACUBITRIL AND VALSARTAN 1 TABLET: 49; 51 TABLET, FILM COATED ORAL at 08:39

## 2025-03-05 RX ADMIN — AMIODARONE HYDROCHLORIDE 200 MG: 200 TABLET ORAL at 08:39

## 2025-03-05 RX ADMIN — CARVEDILOL 3.12 MG: 3.12 TABLET, FILM COATED ORAL at 08:40

## 2025-03-05 RX ADMIN — GUAIFENESIN 600 MG: 600 TABLET ORAL at 08:40

## 2025-03-05 RX ADMIN — BUDESONIDE 0.5 MG: 0.5 INHALANT RESPIRATORY (INHALATION) at 18:50

## 2025-03-05 RX ADMIN — Medication 10 ML: at 08:40

## 2025-03-05 RX ADMIN — CLOPIDOGREL BISULFATE 75 MG: 75 TABLET ORAL at 08:40

## 2025-03-05 RX ADMIN — APIXABAN 5 MG: 5 TABLET, FILM COATED ORAL at 21:06

## 2025-03-05 RX ADMIN — HYDROCODONE BITARTRATE AND ACETAMINOPHEN 1 TABLET: 10; 325 TABLET ORAL at 14:42

## 2025-03-05 RX ADMIN — IPRATROPIUM BROMIDE AND ALBUTEROL SULFATE 3 ML: .5; 3 SOLUTION RESPIRATORY (INHALATION) at 06:44

## 2025-03-05 RX ADMIN — ARFORMOTEROL TARTRATE 15 MCG: 15 SOLUTION RESPIRATORY (INHALATION) at 06:44

## 2025-03-05 RX ADMIN — GABAPENTIN 800 MG: 400 CAPSULE ORAL at 21:06

## 2025-03-05 RX ADMIN — Medication 250 MG: at 21:06

## 2025-03-05 RX ADMIN — APIXABAN 5 MG: 5 TABLET, FILM COATED ORAL at 08:40

## 2025-03-05 RX ADMIN — Medication 10 ML: at 21:06

## 2025-03-05 RX ADMIN — GABAPENTIN 800 MG: 400 CAPSULE ORAL at 14:37

## 2025-03-05 RX ADMIN — PANTOPRAZOLE SODIUM 40 MG: 40 TABLET, DELAYED RELEASE ORAL at 08:40

## 2025-03-05 RX ADMIN — ARFORMOTEROL TARTRATE 15 MCG: 15 SOLUTION RESPIRATORY (INHALATION) at 18:50

## 2025-03-05 RX ADMIN — BUDESONIDE 0.5 MG: 0.5 INHALANT RESPIRATORY (INHALATION) at 06:44

## 2025-03-05 RX ADMIN — HYDROCODONE BITARTRATE AND ACETAMINOPHEN 1 TABLET: 10; 325 TABLET ORAL at 21:05

## 2025-03-05 RX ADMIN — GABAPENTIN 800 MG: 400 CAPSULE ORAL at 05:27

## 2025-03-05 RX ADMIN — GUAIFENESIN 600 MG: 600 TABLET ORAL at 21:06

## 2025-03-05 NOTE — PROGRESS NOTES
RT EQUIPMENT DEVICE RELATED - SKIN ASSESSMENT    RT Medical Equipment/Device:     NIV Mask:  Under-the-nose   size:  B    Skin Assessment:      Cheek:  Intact  Nares:  Intact    Device Skin Pressure Protection:  Pressure points protected    Nurse Notification:  Roseanna Barrett, RRT

## 2025-03-05 NOTE — PLAN OF CARE
Goal Outcome Evaluation:         Patient wore bipap last night. I placed patient on 2L nasal cannula, tolerating well.

## 2025-03-05 NOTE — PLAN OF CARE
Goal Outcome Evaluation:  Plan of Care Reviewed With: patient            VSS during shift. Patient A/O and pleasant. Reports of pain, treated per MAR. Ambulated with PCA's and walker to recliner, no issues. Continue plan of care.

## 2025-03-05 NOTE — PLAN OF CARE
Goal Outcome Evaluation:  Plan of Care Reviewed With: patient        Progress: improving  Outcome Evaluation: NSR with 1st degree block on tele monitor. VSS. Remains in isolation for covid. 2L oxygen per NC. Purewick in place. Pt repeats himself and yells out. Haldol IM given as needed.  PIV to LAC patent. Pain managed effectively per orders. Call light left in reach.

## 2025-03-05 NOTE — PLAN OF CARE
Goal Outcome Evaluation:  Plan of Care Reviewed With: patient        Progress: no change  Outcome Evaluation: Patient has wor bipap unit all night. He is resting comfortably. will continue to monitor

## 2025-03-05 NOTE — DISCHARGE SUMMARY
[facility]         HOSPITALIST  DISCHARGE SUMMARY    Patient Name: Sarah Osorio  : 1955  MRN: [id number]    Date of Admission: 2025  Date of Discharge:  3/5/2025  Primary Care Physician: Silvia Faustin APRN    Active and Resolved Hospital Problems:  Acute hypoxic respiratory failure  Right side pneumonia, unknown bacterial origin  COVID-19 positive  Generalized weakness secondary to above  HFpEF, not in acute exacerbation  Hyperlipidemia  Hypertension  A-fib on Eliquis  Leukocytosis  Sundowning/hospital related delirium -resolved  Ascending AAA, 5.2 cm      Hospital Course     Hospital Course:  Sarah Osorio is a 70 y.o. male with medical history of hypertension, hyperlipidemia, diastolic CHF, A-fib on Eliquis, chronic back pain following injury, morbid obesity and GERD presented to the ED for evaluation of generalized weakness, dyspnea exertion, and fall.       Patient admitted for acute hypoxic respiratory failure. Labs showed leukocytosis, electrolyte imbalances, elevated proBNP and elevated but flat troponin. CT chest negative for PE but did show some right-sided bronchopneumonia.  Started on IV antibiotics for community-acquired pneumonia of unknown bacterial origin.  CT scan did show a 5.2 cm ascending aorta, echo with only mild enlargement of aorta and EF 61%, he will need outpatient vascular follow-up.  Patient developed worsening respiratory status and some hospital related delirium.  Respiratory PCR positive for COVID-19 and coronavirus strain.  He was initiated on p.o. Decadron, this was subsequently discontinued due to AMS/agitation.  Respiratory status improved, mental status improved.  Patient feeling more weak than baseline, starting rehab for ongoing strengthening before returning home.  Patient seen on date of discharge, clinically and hemodynamically stable.  Patient provided concerning signs and symptoms prompting immediate medical attention, patient  understanding and agreeable    DISCHARGE Follow Up Recommendations for labs and diagnostics:   Follow-up with primary care physician as soon as possible  Given concern for underlying sleep apnea, referral placed for sleep study  Patient vascular surgery consult placed of patient's AAA  On discharge patient's Lasix 40 mg twice daily held, this will likely need to be resumed in the next few days        Day of Discharge     Vital Signs:  Temp:  [97.5 °F (36.4 °C)-99 °F (37.2 °C)] 97.5 °F (36.4 °C)  Heart Rate:  [46-72] 65  Resp:  [18-22] 18  BP: (104-155)/(61-85) 148/63  Flow (L/min) (Oxygen Therapy):  [2] 2  Physical Exam:   Gen: NAD, Alert and Oriented  Cards: RRR  Pulm: 1L NC, no respiratory distress  Abd: soft, nondistended  Extremities: no pitting edema    Discharge Details        Discharge Medications        New Medications        Instructions Start Date   guaiFENesin 600 MG 12 hr tablet  Commonly known as: MUCINEX   600 mg, Oral, Every 12 Hours Scheduled      saccharomyces boulardii 250 MG capsule  Commonly known as: FLORASTOR   250 mg, Oral, 2 Times Daily             Changes to Medications        Instructions Start Date   gabapentin 800 MG tablet  Commonly known as: NEURONTIN  What changed: how much to take   400 mg, Oral, 4 Times Daily             Continue These Medications        Instructions Start Date   amiodarone 200 MG tablet  Commonly known as: PACERONE   200 mg, Daily      apixaban 5 MG tablet tablet  Commonly known as: ELIQUIS   5 mg, Every 12 Hours Scheduled      carvedilol 3.125 MG tablet  Commonly known as: COREG   3.125 mg, 2 Times Daily With Meals      clopidogrel 75 MG tablet  Commonly known as: PLAVIX   75 mg, Daily      Entresto 49-51 MG tablet  Generic drug: sacubitril-valsartan   1 tablet, 2 Times Daily      HYDROcodone-acetaminophen  MG per tablet  Commonly known as: NORCO   1 tablet, Every 6 Hours PRN      multivitamin with minerals tablet tablet   1 tablet, Daily      pantoprazole 40  MG EC tablet  Commonly known as: PROTONIX   40 mg, Daily      pravastatin 40 MG tablet  Commonly known as: PRAVACHOL   40 mg, Daily             Stop These Medications      furosemide 40 MG tablet  Commonly known as: LASIX              No Known Allergies    Discharge Disposition:  Skilled Nursing Facility (DC - External)    Diet:  Hospital:  Diet Order   Procedures    Diet: Cardiac; Healthy Heart (2-3 Na+); Fluid Consistency: Thin (IDDSI 0)       Discharge Activity:   Activity Instructions       Activity as Tolerated              CODE STATUS:  Code Status and Medical Interventions: CPR (Attempt to Resuscitate); Full Support   Ordered at: 02/22/25 2000     Level Of Support Discussed With:    Patient     Code Status (Patient has no pulse and is not breathing):    CPR (Attempt to Resuscitate)     Medical Interventions (Patient has pulse or is breathing):    Full Support         No future appointments.    Additional Instructions for the Follow-ups that You Need to Schedule       Ambulatory Referral to Sleep Medicine   As directed      Follow-up needed: Yes        Discharge Follow-up with PCP   As directed       Currently Documented PCP:    Silvia Faustin APRN    PCP Phone Number:    [phone]     Follow Up Details: In less than one week                Pertinent  and/or Most Recent Results     PROCEDURES:   None     LAB RESULTS:      Lab 03/05/25  0519 03/04/25  0447 03/02/25  0547 03/01/25  0436 02/28/25  0424 02/27/25  0417   WBC 9.24 12.13* 8.98 10.95* 12.56* 14.00*   HEMOGLOBIN 13.1 14.1 14.3 14.9 14.9 15.1   HEMATOCRIT 40.8 44.3 43.9 46.4 45.5 47.8   PLATELETS 285 283 278 311 307 320   NEUTROS ABS  --  9.53* 6.60 8.85* 10.80* 12.38*   IMMATURE GRANS (ABS)  --  0.14* 0.13* 0.13* 0.11* 0.14*   LYMPHS ABS  --  1.16 1.36 1.13 0.74 0.73   MONOS ABS  --  0.71 0.60 0.77 0.86 0.72   EOS ABS  --  0.55* 0.26 0.05 0.00 0.00   MCV 97.1* 95.3 95.4 96.3 95.8 96.2         Lab 03/05/25  0519 03/04/25  0448 03/03/25  0542  03/02/25  0547 03/01/25  0436 02/28/25  0424 02/27/25  0417   SODIUM 136 136 128* 131* 133* 135* 136   POTASSIUM 4.2 3.9 3.5 3.5 3.4* 3.7 3.8   CHLORIDE 99 94* 88* 89* 88* 91* 88*   CO2 33.4* 35.2* 35.7* 35.3* 36.9* 36.2* 36.6*   ANION GAP 3.6* 6.8 4.3* 6.7 8.1 7.8 11.4   BUN 14 19 27* 30* 25* 22 18   CREATININE 0.83 0.90 0.77 0.84 0.96 0.82 0.82   EGFR 93.6 91.3 95.7 93.2 84.5 93.9 93.9   GLUCOSE 137* 136* 118* 129* 213* 153* 143*   CALCIUM 8.5* 8.8 8.2* 8.1* 8.8 8.7 9.5   MAGNESIUM 2.5*  --   --  2.2 2.2 2.2 2.1   PHOSPHORUS  --   --   --  3.7 3.9 3.6 2.1*                         Lab 02/27/25 2053 02/27/25  1028 02/26/25  2341   PH, ARTERIAL 7.471* 7.390 7.420   PCO2, ARTERIAL 60.6* 57.1* 61.8*   PO2 ART 69.5* 58.3* 56.8*   O2 SATURATION ART 94.0* 88.7* 88.5*   FIO2 36 28 28   HCO3 ART 44.3* 34.6* 40.1*   BASE EXCESS ART 16.2* 7.3* 12.3*     Brief Urine Lab Results  (Last result in the past 365 days)        Color   Clarity   Blood   Leuk Est   Nitrite   Protein   CREAT   Urine HCG        02/26/25 1328 Yellow   Clear   Negative   Negative   Negative   Negative                 Microbiology Results (last 10 days)       Procedure Component Value - Date/Time    S. Pneumo Ag Urine or CSF - Urine, Urine, Clean Catch [[id number]]  (Normal) Collected: 02/25/25 1630    Lab Status: Final result Specimen: Urine, Clean Catch Updated: 02/25/25 1726     Strep Pneumo Ag Negative    Legionella Antigen, Urine - Urine, Urine, Clean Catch [[id number]]  (Normal) Collected: 02/25/25 1630    Lab Status: Final result Specimen: Urine, Clean Catch Updated: 02/25/25 1726     LEGIONELLA ANTIGEN, URINE Negative    Respiratory Panel PCR w/COVID-19(SARS-CoV-2) CAREN/TYRELL/ONEAL/PAD/COR/AMRITA In-House, NP Swab in UTM/VTM, 2 HR TAT - Swab, Nasopharynx [[id number]]  (Abnormal) Collected: 02/25/25 0919    Lab Status: Final result Specimen: Swab from Nasopharynx Updated: 02/25/25 1119     ADENOVIRUS, PCR Not Detected     Coronavirus 229E Not Detected      Coronavirus HKU1 Not Detected     Coronavirus NL63 Detected     Coronavirus OC43 Not Detected     COVID19 Detected     Human Metapneumovirus Not Detected     Human Rhinovirus/Enterovirus Not Detected     Influenza A PCR Not Detected     Influenza B PCR Not Detected     Parainfluenza Virus 1 Not Detected     Parainfluenza Virus 2 Not Detected     Parainfluenza Virus 3 Not Detected     Parainfluenza Virus 4 Not Detected     RSV, PCR Not Detected     Bordetella pertussis pcr Not Detected     Bordetella parapertussis PCR Not Detected     Chlamydophila pneumoniae PCR Not Detected     Mycoplasma pneumo by PCR Not Detected    Narrative:      In the setting of a positive respiratory panel with a viral infection PLUS a negative procalcitonin without other underlying concern for bacterial infection, consider observing off antibiotics or discontinuation of antibiotics and continue supportive care. If the respiratory panel is positive for atypical bacterial infection (Bordetella pertussis, Chlamydophila pneumoniae, or Mycoplasma pneumoniae), consider antibiotic de-escalation to target atypical bacterial infection.            CT Head Without Contrast    Result Date: 2/27/2025  Impression: Impression: 1.No definite acute intracranial findings. 2.Diffuse brain atrophy. 3.Extensive periventricular hypodense areas compatible with chronic small vessel ischemia. Electronically Signed: Leandro Feliciano MD  2/27/2025 2:13 PM EST  Workstation ID: [id number]    XR Chest 1 View    Result Date: 2/25/2025  Impression: Impression: 1. Low lung volumes with bilateral perihilar airspace opacities likely representing combination of atelectasis and bronchopneumonia. Electronically Signed: Georges Packer  2/25/2025 3:50 PM EST  Workstation ID: [id number]    CT Chest With Contrast Diagnostic    Addendum Date: 2/22/2025    ADDENDUM:  Postoperative changes involve the cervical spine and are partially imaged on the study. There are degenerative changes  throughout the imaged spine.   Portions of this note were completed with a voice recognition program.  2/22/2025 9:42 PM by Sampson Giordano MD on Workstation: [email or web address]      Result Date: 2/22/2025  Impression: 1.  Grossly, no proximal (or central) no pulmonary embolism. 2.  There is fusiform dilatation of the ascending aorta, which measures about 5.2 cm greatest diameter. No thoracic aortic dissection is seen. 3.  Right-sided bronchopneumonia is suggested. 4.  Please see above comments for further detail.   Portions of this note were completed with a voice recognition program.  2/22/2025 9:39 PM by Sampson Giordano MD on Workstation: [email or web address]      CT Head Without Contrast    Result Date: 2/22/2025  Impression: Impression: No acute intracranial pathology. Electronically Signed: Sampson Brooks MD  2/22/2025 4:01 PM EST  Workstation ID: [id number]    XR Chest 1 View    Result Date: 2/22/2025  Impression: Impression: Hazy increased density over the right hemithorax may represent mild airspace disease. No dense consolidation or mass is seen. Electronically Signed: Alireza Flor MD  2/22/2025 3:13 PM EST  Workstation ID: [id number]             Results for orders placed during the hospital encounter of 02/22/25    Adult Transthoracic Echo Complete W/ Cont if Necessary Per Protocol    Interpretation Summary    The study is technically suboptimal for diagnosis    Left ventricular systolic function is normal. Calculated left ventricular EF = 61.7%    Left ventricular diastolic function was indeterminate.    The right atrial cavity is borderline dilated.    There is mild calcification of the aortic valve.    Mild dilation of the aortic root is present.      Labs Pending at Discharge:        Time spent on Discharge including face to face service:  45 minutes    Electronically signed by Alfredo Oliver MD, 03/05/25, 10:13 AM EST.

## 2025-03-05 NOTE — THERAPY TREATMENT NOTE
Acute Care - Physical Therapy Treatment Note   [place]     Patient Name: Sarah Osorio  : 1955  MRN: [id number]  Today's Date: 3/5/2025      Visit Dx:     ICD-10-CM ICD-9-CM   1. Fall, initial encounter  W19.XXXA E888.9   2. Weakness  R53.1 780.79   3. Pneumonia due to infectious organism, unspecified laterality, unspecified part of lung  J18.9 486   4. Congestive heart failure, unspecified HF chronicity, unspecified heart failure type  I50.9 428.0   5. Acute respiratory failure with hypoxia  J96.01 518.81   6. Difficulty in walking  R26.2 719.7   7. Decreased activities of daily living (ADL)  Z78.9 V49.89   8. Sleep apnea, unspecified type  G47.30 780.57   9. Abdominal aortic aneurysm (AAA) without rupture, unspecified part  I71.40 441.4     Patient Active Problem List   Diagnosis    Seizure    Encephalopathy, toxic    Acute on chronic respiratory failure with hypoxia    Weakness    Fall    Pneumonia due to infectious organism    Congestive heart failure    HTN (hypertension)     Past Medical History:   Diagnosis Date    Abdominal aneurysm     Atrial fib/flutter, transient     CHF (congestive heart failure)     Hyperlipidemia     Hypertension     Injury of back      Past Surgical History:   Procedure Laterality Date    APPENDECTOMY      CARDIAC CATHETERIZATION      HERNIA REPAIR       PT Assessment (Last 12 Hours)       PT Evaluation and Treatment       Row Name 25 1453          Physical Therapy Time and Intention    Subjective Information complains of;weakness;fatigue  -SM     Document Type therapy note (daily note)  -SM     Mode of Treatment individual therapy;physical therapy  -SM     Patient Effort good  -SM     Symptoms Noted During/After Treatment fatigue  -SM       Row Name 25 1453          Transfers    Transfers sit-stand transfer;stand-sit transfer  -       Row Name 25 1453          Sit-Stand Transfer    Sit-Stand [place] (Transfers) contact guard;verbal cues  -      Assistive Device (Sit-Stand Transfers) walker, front-wheeled  -       Row Name 03/05/25 1453          Stand-Sit Transfer    Stand-Sit [place] (Transfers) contact guard;verbal cues  -     Assistive Device (Stand-Sit Transfers) walker, front-wheeled  -       Row Name 03/05/25 1453          Gait/Stairs (Locomotion)    Gait/Stairs Locomotion gait/ambulation assistive device  -     [place] (Gait) contact guard;verbal cues  -     Assistive Device (Gait) walker, front-wheeled  -     Patient was able to Ambulate yes  -     Distance in Feet (Gait) 25  -SM     Deviations/Abnormal Patterns (Gait) gait speed decreased;base of support, wide  -     Bilateral Gait Deviations forward flexed posture  -       Row Name 03/05/25 1453          Safety Issues/Impairments Affecting Functional Mobility    Impairments Affecting Function (Mobility) balance;endurance/activity tolerance;shortness of breath;strength  -       Row Name 03/05/25 1453          Balance    Dynamic Standing Balance contact guard;verbal cues  -     Position/Device Used, Standing Balance walker, front-wheeled  -       Row Name 03/05/25 1453          Positioning and Restraints    Pre-Treatment Position sitting in chair/recliner  -     Post Treatment Position chair  -SM     In Chair reclined;call light within reach;encouraged to call for assist;exit alarm on;heels elevated  -       Row Name 03/05/25 1453          Progress Summary (PT)    Progress Toward Functional Goals (PT) progress toward functional goals is good  -               User Key  (r) = Recorded By, (t) = Taken By, (c) = Cosigned By      Initials Name Provider Type    Vanessa Dean PTA Physical Therapist Assistant                    Physical Therapy Education       Title: PT OT SLP Therapies (In Progress)       Topic: Physical Therapy (Done)       Point: Mobility training (Done)       Learning Progress Summary            Patient Acceptance, E,TB, VU by TM at  2/26/2025 1133                      Point: Home exercise program (Done)       Learning Progress Summary            Patient Acceptance, E,TB, VU by TM at 2/26/2025 1133                      Point: Body mechanics (Done)       Learning Progress Summary            Patient Acceptance, E,TB, VU by TM at 2/26/2025 1133                      Point: Precautions (Done)       Learning Progress Summary            Patient Acceptance, E,TB, VU by TM at 2/26/2025 1133                                      User Key       Initials Effective Dates Name Provider Type Discipline     02/04/25 -  Ernie Bernardo, PT Student PT Student PT                  PT Recommendation and Plan     Progress Summary (PT)  Progress Toward Functional Goals (PT): progress toward functional goals is good   Outcome Measures       Row Name 03/05/25 1456 03/03/25 1616          How much help from another person do you currently need...    Turning from your back to your side while in flat bed without using bedrails? 4  -SM 3  -SM     Moving from lying on back to sitting on the side of a flat bed without bedrails? 3  -SM 3  -SM     Moving to and from a bed to a chair (including a wheelchair)? 3  -SM 3  -SM     Standing up from a chair using your arms (e.g., wheelchair, bedside chair)? 3  -SM 3  -SM     Climbing 3-5 steps with a railing? 2  -SM 2  -SM     To walk in hospital room? 2  -SM 2  -SM     AM-PAC 6 Clicks Score (PT) 17  -SM 16  -SM               User Key  (r) = Recorded By, (t) = Taken By, (c) = Cosigned By      Initials Name Provider Type     Vanessa Woodward PTA Physical Therapist Assistant                     Time Calculation:    PT Charges       Row Name 03/05/25 1453             Time Calculation    PT Received On 03/05/25  -         Timed Charges    18105 - Gait Training Minutes  10  -SM      82475 - PT Therapeutic Activity Minutes 15  -SM         Total Minutes    Timed Charges Total Minutes 25  -SM       Total Minutes 25  -SM                User Key   (r) = Recorded By, (t) = Taken By, (c) = Cosigned By      Initials Name Provider Type     Vanessa Woodward PTA Physical Therapist Assistant                  Therapy Charges for Today       Code Description Service Date Service Provider Modifiers Qty    98979254077 HC GAIT TRAINING EA 15 MIN 3/5/2025 Vanessa Woodward PTA GP 1    94267797817 HC PT THERAPEUTIC ACT EA 15 MIN 3/5/2025 Vanessa Woodward PTA GP 1            PT G-Codes  Outcome Measure Options: AM-PAC 6 Clicks Daily Activity (OT), Optimal Instrument  AM-PAC 6 Clicks Score (PT): 17  AM-PAC 6 Clicks Score (OT): 12    Vanessa Woodward PTA  3/5/2025

## 2025-03-05 NOTE — THERAPY EVALUATION
RT EQUIPMENT DEVICE RELATED - SKIN ASSESSMENT    RT Medical Equipment/Device:     NIV Mask:  Full-face    size:      Skin Assessment:      Cheek:  Intact  Nose:  Intact  Mouth:  Intact    Device Skin Pressure Protection:  Positioning supports utilized    Nurse Notification:  Roseanna Orr, RRT

## 2025-03-05 NOTE — PROGRESS NOTES
[facility]   Hospitalist Progress Note  Date: 3/5/2025  Patient Name: Sarah Osorio  : 1955  MRN: [id number]  Date of admission: 2025  Room/Bed: [facility]/      Subjective   Subjective     Chief Complaint: Weakness, fall    Summary:  Sarah Osorio is a 70 y.o. male with past medical history of hypertension, hyperlipidemia, diastolic CHF, A-fib on Eliquis, chronic back pain following injury, morbid obesity and GERD presented to the ED for evaluation of generalized weakness, dyspnea exertion, and fall.      Patient admitted for acute hypoxic respiratory failure. Labs showed leukocytosis, electrolyte imbalances, elevated proBNP and elevated but flat troponin. CT chest negative for PE but did show some right-sided bronchopneumonia.  Started on IV antibiotics for community-acquired pneumonia of unknown bacterial origin.  CT scan did show a 5.2 cm ascending aorta, echo with only mild enlargement of aorta and EF 61%, he will need outpatient vascular follow-up.  Patient developed worsening respiratory status and some hospital related delirium.  Respiratory PCR positive for COVID-19 and coronavirus strain.  He was initiated on p.o. Decadron, this was subsequently discontinued due to AMS/agitation.  Respiratory status improved, mental status improved.  Patient feeling more weak than baseline, pending rehab placement.    Interval Followup:   Plan was to discharge to SNF today. Orders placed discharge, however later SNF reach back out to case management indicating he would need to stay in the hospital until he was out of his isolation status for COVID.  No acute events overnight, patient remained stable on current amount of oxygen fluctuating between 1 and 2 L      Objective   Objective     Vitals:   Temp:  [97.5 °F (36.4 °C)-99 °F (37.2 °C)] 97.9 °F (36.6 °C)  Heart Rate:  [46-72] 47  Resp:  [18] 18  BP: (118-155)/(63-85) 120/65  Flow (L/min) (Oxygen Therapy):  [2] 2    Physical Exam   Gen: NAD, Alert and  Oriented  Cards: RRR  Pulm: 2L NC, no respiratory distress  Abd: soft, nondistended  Extremities: no pitting edema    Result Review    Result Review:  I have personally reviewed these results:  [x]  Laboratory      Lab 03/05/25 0519 03/04/25 0447 03/02/25  0547 03/01/25  0436   WBC 9.24 12.13* 8.98 10.95*   HEMOGLOBIN 13.1 14.1 14.3 14.9   HEMATOCRIT 40.8 44.3 43.9 46.4   PLATELETS 285 283 278 311   NEUTROS ABS  --  9.53* 6.60 8.85*   IMMATURE GRANS (ABS)  --  0.14* 0.13* 0.13*   LYMPHS ABS  --  1.16 1.36 1.13   MONOS ABS  --  0.71 0.60 0.77   EOS ABS  --  0.55* 0.26 0.05   MCV 97.1* 95.3 95.4 96.3         Lab 03/05/25 0519 03/04/25 0448 03/03/25 0542 03/02/25  0547 03/01/25  0436 02/28/25  0424   SODIUM 136 136 128* 131* 133* 135*   POTASSIUM 4.2 3.9 3.5 3.5 3.4* 3.7   CHLORIDE 99 94* 88* 89* 88* 91*   CO2 33.4* 35.2* 35.7* 35.3* 36.9* 36.2*   ANION GAP 3.6* 6.8 4.3* 6.7 8.1 7.8   BUN 14 19 27* 30* 25* 22   CREATININE 0.83 0.90 0.77 0.84 0.96 0.82   EGFR 93.6 91.3 95.7 93.2 84.5 93.9   GLUCOSE 137* 136* 118* 129* 213* 153*   CALCIUM 8.5* 8.8 8.2* 8.1* 8.8 8.7   MAGNESIUM 2.5*  --   --  2.2 2.2 2.2   PHOSPHORUS  --   --   --  3.7 3.9 3.6                             Lab 02/27/25 2053 02/27/25  1028 02/26/25  2341   PH, ARTERIAL 7.471* 7.390 7.420   PCO2, ARTERIAL 60.6* 57.1* 61.8*   PO2 ART 69.5* 58.3* 56.8*   O2 SATURATION ART 94.0* 88.7* 88.5*   FIO2 36 28 28   HCO3 ART 44.3* 34.6* 40.1*   BASE EXCESS ART 16.2* 7.3* 12.3*     Brief Urine Lab Results  (Last result in the past 365 days)        Color   Clarity   Blood   Leuk Est   Nitrite   Protein   CREAT   Urine HCG        02/26/25 1328 Yellow   Clear   Negative   Negative   Negative   Negative                 [x]  Microbiology   Microbiology Results (last 10 days)       Procedure Component Value - Date/Time    S. Pneumo Ag Urine or CSF - Urine, Urine, Clean Catch [[id number]]  (Normal) Collected: 02/25/25 1630    Lab Status: Final result Specimen: Urine, Clean  Catch Updated: 02/25/25 1726     Strep Pneumo Ag Negative    Legionella Antigen, Urine - Urine, Urine, Clean Catch [[id number]]  (Normal) Collected: 02/25/25 1630    Lab Status: Final result Specimen: Urine, Clean Catch Updated: 02/25/25 1726     LEGIONELLA ANTIGEN, URINE Negative    Respiratory Panel PCR w/COVID-19(SARS-CoV-2) [facility] In-House, NP Swab in UTM/VTM, 2 HR TAT - Swab, Nasopharynx [[id number]]  (Abnormal) Collected: 02/25/25 0919    Lab Status: Final result Specimen: Swab from Nasopharynx Updated: 02/25/25 1119     ADENOVIRUS, PCR Not Detected     Coronavirus 229E Not Detected     Coronavirus HKU1 Not Detected     Coronavirus NL63 Detected     Coronavirus OC43 Not Detected     COVID19 Detected     Human Metapneumovirus Not Detected     Human Rhinovirus/Enterovirus Not Detected     Influenza A PCR Not Detected     Influenza B PCR Not Detected     Parainfluenza Virus 1 Not Detected     Parainfluenza Virus 2 Not Detected     Parainfluenza Virus 3 Not Detected     Parainfluenza Virus 4 Not Detected     RSV, PCR Not Detected     Bordetella pertussis pcr Not Detected     Bordetella parapertussis PCR Not Detected     Chlamydophila pneumoniae PCR Not Detected     Mycoplasma pneumo by PCR Not Detected    Narrative:      In the setting of a positive respiratory panel with a viral infection PLUS a negative procalcitonin without other underlying concern for bacterial infection, consider observing off antibiotics or discontinuation of antibiotics and continue supportive care. If the respiratory panel is positive for atypical bacterial infection (Bordetella pertussis, Chlamydophila pneumoniae, or Mycoplasma pneumoniae), consider antibiotic de-escalation to target atypical bacterial infection.          [x]  Radiology  CT Head Without Contrast    Result Date: 2/27/2025  Impression: 1.No definite acute intracranial findings. 2.Diffuse brain atrophy. 3.Extensive periventricular hypodense areas compatible  with chronic small vessel ischemia. Electronically Signed: Leandro Feliciano MD  2/27/2025 2:13 PM EST  Workstation ID: [id number]   []  EKG/Telemetry   []  Cardiology/Vascular   []  Pathology  []  Old records  []  Other:    Assessment & Plan   Assessment / Plan     Assessment:  Acute hypoxic respiratory failure  Right side pneumonia, unknown bacterial origin  COVID-19 positive  Generalized weakness secondary to above  HFpEF, not in acute exacerbation  Hyperlipidemia  Hypertension  A-fib on Eliquis  Leukocytosis  Sundowning/hospital related delirium -resolved  Ascending AAA, 5.2 cm    Plan:  Continue inpatient admission  Continue supplemental O2 as needed.  Continue to wean as tolerated.  Completed IV Rocephin and azithromycin  Brovana, Pulmicort, DuoNebs  Incentive spirometer, Acapella, chest vest  WBC down to 9  Echo: EF 61%  Hold PO Lasix 40 mg twice daily  Continue Eliquis 5 mg twice daily  CT scan reviewed.  Right sided bronchopneumonia.  AAA 5.2 cm - will need vascular follow-up.  Suspect patient has underlying sleep apnea or obesity hypoventilation syndrome given that he is a chronic CO2 retainer.  Will refer him to sleep eval at discharge.  PT/OT  A.m. labs  Original plans for discharge to SNF however, SNF called back indicating patient needs to remain in the hospital until out of isolation window       Discussed with RN.    VTE Prophylaxis:  Pharmacologic VTE prophylaxis orders are present.        CODE STATUS:   Level Of Support Discussed With: Patient  Code Status (Patient has no pulse and is not breathing): CPR (Attempt to Resuscitate)  Medical Interventions (Patient has pulse or is breathing): Full Support      Electronically signed by Alfredo Oliver MD, 3/5/2025, 17:55 EST.

## 2025-03-06 LAB
ANION GAP SERPL CALCULATED.3IONS-SCNC: 5.4 MMOL/L (ref 5–15)
BUN SERPL-MCNC: 10 MG/DL (ref 8–23)
BUN/CREAT SERPL: 13.5 (ref 7–25)
CALCIUM SPEC-SCNC: 9.2 MG/DL (ref 8.6–10.5)
CHLORIDE SERPL-SCNC: 98 MMOL/L (ref 98–107)
CO2 SERPL-SCNC: 32.6 MMOL/L (ref 22–29)
CREAT SERPL-MCNC: 0.74 MG/DL (ref 0.76–1.27)
DEPRECATED RDW RBC AUTO: 46.1 FL (ref 37–54)
EGFRCR SERPLBLD CKD-EPI 2021: 96.9 ML/MIN/1.73
ERYTHROCYTE [DISTWIDTH] IN BLOOD BY AUTOMATED COUNT: 12.6 % (ref 12.3–15.4)
GLUCOSE SERPL-MCNC: 141 MG/DL (ref 65–99)
HCT VFR BLD AUTO: 44.2 % (ref 37.5–51)
HGB BLD-MCNC: 14.1 G/DL (ref 13–17.7)
MAGNESIUM SERPL-MCNC: 2.3 MG/DL (ref 1.6–2.4)
MCH RBC QN AUTO: 31.5 PG (ref 26.6–33)
MCHC RBC AUTO-ENTMCNC: 31.9 G/DL (ref 31.5–35.7)
MCV RBC AUTO: 98.7 FL (ref 79–97)
PLATELET # BLD AUTO: 316 10*3/MM3 (ref 140–450)
PMV BLD AUTO: 9.6 FL (ref 6–12)
POTASSIUM SERPL-SCNC: 5.2 MMOL/L (ref 3.5–5.2)
RBC # BLD AUTO: 4.48 10*6/MM3 (ref 4.14–5.8)
SODIUM SERPL-SCNC: 136 MMOL/L (ref 136–145)
WBC NRBC COR # BLD AUTO: 9.17 10*3/MM3 (ref 3.4–10.8)

## 2025-03-06 PROCEDURE — 99232 SBSQ HOSP IP/OBS MODERATE 35: CPT | Performed by: INTERNAL MEDICINE

## 2025-03-06 PROCEDURE — 94761 N-INVAS EAR/PLS OXIMETRY MLT: CPT

## 2025-03-06 PROCEDURE — 94664 DEMO&/EVAL PT USE INHALER: CPT

## 2025-03-06 PROCEDURE — 80048 BASIC METABOLIC PNL TOTAL CA: CPT | Performed by: INTERNAL MEDICINE

## 2025-03-06 PROCEDURE — 83735 ASSAY OF MAGNESIUM: CPT | Performed by: INTERNAL MEDICINE

## 2025-03-06 PROCEDURE — 94799 UNLISTED PULMONARY SVC/PX: CPT

## 2025-03-06 PROCEDURE — 85027 COMPLETE CBC AUTOMATED: CPT | Performed by: INTERNAL MEDICINE

## 2025-03-06 RX ORDER — FUROSEMIDE 40 MG/1
40 TABLET ORAL
Status: DISCONTINUED | OUTPATIENT
Start: 2025-03-06 | End: 2025-03-07 | Stop reason: HOSPADM

## 2025-03-06 RX ADMIN — GABAPENTIN 800 MG: 400 CAPSULE ORAL at 05:18

## 2025-03-06 RX ADMIN — APIXABAN 5 MG: 5 TABLET, FILM COATED ORAL at 09:14

## 2025-03-06 RX ADMIN — Medication 10 ML: at 20:42

## 2025-03-06 RX ADMIN — HYDROCODONE BITARTRATE AND ACETAMINOPHEN 1 TABLET: 10; 325 TABLET ORAL at 20:44

## 2025-03-06 RX ADMIN — GABAPENTIN 800 MG: 400 CAPSULE ORAL at 14:34

## 2025-03-06 RX ADMIN — IPRATROPIUM BROMIDE AND ALBUTEROL SULFATE 3 ML: .5; 3 SOLUTION RESPIRATORY (INHALATION) at 06:44

## 2025-03-06 RX ADMIN — GUAIFENESIN 600 MG: 600 TABLET ORAL at 09:14

## 2025-03-06 RX ADMIN — AMIODARONE HYDROCHLORIDE 200 MG: 200 TABLET ORAL at 09:14

## 2025-03-06 RX ADMIN — FUROSEMIDE 40 MG: 40 TABLET ORAL at 09:14

## 2025-03-06 RX ADMIN — FUROSEMIDE 40 MG: 40 TABLET ORAL at 18:24

## 2025-03-06 RX ADMIN — ARFORMOTEROL TARTRATE 15 MCG: 15 SOLUTION RESPIRATORY (INHALATION) at 06:44

## 2025-03-06 RX ADMIN — ARFORMOTEROL TARTRATE 15 MCG: 15 SOLUTION RESPIRATORY (INHALATION) at 18:42

## 2025-03-06 RX ADMIN — SACUBITRIL AND VALSARTAN 1 TABLET: 49; 51 TABLET, FILM COATED ORAL at 20:43

## 2025-03-06 RX ADMIN — GUAIFENESIN 600 MG: 600 TABLET ORAL at 20:42

## 2025-03-06 RX ADMIN — BUDESONIDE 0.5 MG: 0.5 INHALANT RESPIRATORY (INHALATION) at 06:44

## 2025-03-06 RX ADMIN — PANTOPRAZOLE SODIUM 40 MG: 40 TABLET, DELAYED RELEASE ORAL at 09:14

## 2025-03-06 RX ADMIN — Medication 250 MG: at 20:42

## 2025-03-06 RX ADMIN — CLOPIDOGREL BISULFATE 75 MG: 75 TABLET ORAL at 09:14

## 2025-03-06 RX ADMIN — APIXABAN 5 MG: 5 TABLET, FILM COATED ORAL at 20:43

## 2025-03-06 RX ADMIN — Medication 250 MG: at 09:14

## 2025-03-06 RX ADMIN — Medication 10 ML: at 09:15

## 2025-03-06 RX ADMIN — HYDROCODONE BITARTRATE AND ACETAMINOPHEN 1 TABLET: 10; 325 TABLET ORAL at 05:18

## 2025-03-06 RX ADMIN — SACUBITRIL AND VALSARTAN 1 TABLET: 49; 51 TABLET, FILM COATED ORAL at 09:14

## 2025-03-06 RX ADMIN — IPRATROPIUM BROMIDE AND ALBUTEROL SULFATE 3 ML: .5; 3 SOLUTION RESPIRATORY (INHALATION) at 11:27

## 2025-03-06 RX ADMIN — GABAPENTIN 800 MG: 400 CAPSULE ORAL at 20:44

## 2025-03-06 RX ADMIN — BUDESONIDE 0.5 MG: 0.5 INHALANT RESPIRATORY (INHALATION) at 18:42

## 2025-03-06 NOTE — PROGRESS NOTES
[facility]   Hospitalist Progress Note  Date: 3/6/2025  Patient Name: Sarah Osorio  : 1955  MRN: [id number]  Date of admission: 2025  Room/Bed: [facility]/      Subjective   Subjective     Chief Complaint: Weakness, fall    Summary:  Sarah Osorio is a 70 y.o. male with past medical history of hypertension, hyperlipidemia, diastolic CHF, A-fib on Eliquis, chronic back pain following injury, morbid obesity and GERD presented to the ED for evaluation of generalized weakness, dyspnea exertion, and fall.      Patient admitted for acute hypoxic respiratory failure. Labs showed leukocytosis, electrolyte imbalances, elevated proBNP and elevated but flat troponin. CT chest negative for PE but did show some right-sided bronchopneumonia.  Started on IV antibiotics for community-acquired pneumonia of unknown bacterial origin.  CT scan did show a 5.2 cm ascending aorta, echo with only mild enlargement of aorta and EF 61%, he will need outpatient vascular follow-up.  Patient developed worsening respiratory status and some hospital related delirium.  Respiratory PCR positive for COVID-19 and coronavirus strain.  He was initiated on p.o. Decadron, this was subsequently discontinued due to AMS/agitation.  Respiratory status improved, mental status improved.  Patient feeling more weak than baseline, pending rehab placement.    Interval Followup:   No acute issues overnight, resting in bed comfortably.  Plan for patient to discharge tomorrow to SNF.  Will hold patient's Coreg as patient's heart rate has intermittently been low.  Will start patient back on his home Lasix and monitor response.      Objective   Objective     Vitals:   Temp:  [97.3 °F (36.3 °C)-97.9 °F (36.6 °C)] 97.9 °F (36.6 °C)  Heart Rate:  [42-61] 47  Resp:  [18-20] 18  BP: (123-147)/(65-80) 132/73  Flow (L/min) (Oxygen Therapy):  [2] 2    Physical Exam   Gen: NAD, Alert and Oriented  Cards: RRR  Pulm: 2L NC, no respiratory distress  Abd: soft,  nondistended  Extremities: no pitting edema    Result Review    Result Review:  I have personally reviewed these results:  [x]  Laboratory      Lab 03/06/25  0520 03/05/25  0519 03/04/25 0447 03/02/25  0547 03/01/25  0436   WBC 9.17 9.24 12.13* 8.98 10.95*   HEMOGLOBIN 14.1 13.1 14.1 14.3 14.9   HEMATOCRIT 44.2 40.8 44.3 43.9 46.4   PLATELETS 316 285 283 278 311   NEUTROS ABS  --   --  9.53* 6.60 8.85*   IMMATURE GRANS (ABS)  --   --  0.14* 0.13* 0.13*   LYMPHS ABS  --   --  1.16 1.36 1.13   MONOS ABS  --   --  0.71 0.60 0.77   EOS ABS  --   --  0.55* 0.26 0.05   MCV 98.7* 97.1* 95.3 95.4 96.3         Lab 03/06/25  0520 03/05/25 0519 03/04/25 0448 03/03/25  0542 03/02/25  0547 03/01/25  0436 02/28/25  0424   SODIUM 136 136 136   < > 131* 133* 135*   POTASSIUM 5.2 4.2 3.9   < > 3.5 3.4* 3.7   CHLORIDE 98 99 94*   < > 89* 88* 91*   CO2 32.6* 33.4* 35.2*   < > 35.3* 36.9* 36.2*   ANION GAP 5.4 3.6* 6.8   < > 6.7 8.1 7.8   BUN 10 14 19   < > 30* 25* 22   CREATININE 0.74* 0.83 0.90   < > 0.84 0.96 0.82   EGFR 96.9 93.6 91.3   < > 93.2 84.5 93.9   GLUCOSE 141* 137* 136*   < > 129* 213* 153*   CALCIUM 9.2 8.5* 8.8   < > 8.1* 8.8 8.7   MAGNESIUM 2.3 2.5*  --   --  2.2 2.2 2.2   PHOSPHORUS  --   --   --   --  3.7 3.9 3.6    < > = values in this interval not displayed.                             Lab 02/27/25 2053   PH, ARTERIAL 7.471*   PCO2, ARTERIAL 60.6*   PO2 ART 69.5*   O2 SATURATION ART 94.0*   FIO2 36   HCO3 ART 44.3*   BASE EXCESS ART 16.2*     Brief Urine Lab Results  (Last result in the past 365 days)        Color   Clarity   Blood   Leuk Est   Nitrite   Protein   CREAT   Urine HCG        02/26/25 1328 Yellow   Clear   Negative   Negative   Negative   Negative                 [x]  Microbiology   Microbiology Results (last 10 days)       Procedure Component Value - Date/Time    S. Pneumo Ag Urine or CSF - Urine, Urine, Clean Catch [[id number]]  (Normal) Collected: 02/25/25 1630    Lab Status: Final result  Specimen: Urine, Clean Catch Updated: 02/25/25 1726     Strep Pneumo Ag Negative    Legionella Antigen, Urine - Urine, Urine, Clean Catch [[id number]]  (Normal) Collected: 02/25/25 1630    Lab Status: Final result Specimen: Urine, Clean Catch Updated: 02/25/25 1726     LEGIONELLA ANTIGEN, URINE Negative    Respiratory Panel PCR w/COVID-19(SARS-CoV-2) [facility] In-House, NP Swab in UTM/VTM, 2 HR TAT - Swab, Nasopharynx [[id number]]  (Abnormal) Collected: 02/25/25 0919    Lab Status: Final result Specimen: Swab from Nasopharynx Updated: 02/25/25 1119     ADENOVIRUS, PCR Not Detected     Coronavirus 229E Not Detected     Coronavirus HKU1 Not Detected     Coronavirus NL63 Detected     Coronavirus OC43 Not Detected     COVID19 Detected     Human Metapneumovirus Not Detected     Human Rhinovirus/Enterovirus Not Detected     Influenza A PCR Not Detected     Influenza B PCR Not Detected     Parainfluenza Virus 1 Not Detected     Parainfluenza Virus 2 Not Detected     Parainfluenza Virus 3 Not Detected     Parainfluenza Virus 4 Not Detected     RSV, PCR Not Detected     Bordetella pertussis pcr Not Detected     Bordetella parapertussis PCR Not Detected     Chlamydophila pneumoniae PCR Not Detected     Mycoplasma pneumo by PCR Not Detected    Narrative:      In the setting of a positive respiratory panel with a viral infection PLUS a negative procalcitonin without other underlying concern for bacterial infection, consider observing off antibiotics or discontinuation of antibiotics and continue supportive care. If the respiratory panel is positive for atypical bacterial infection (Bordetella pertussis, Chlamydophila pneumoniae, or Mycoplasma pneumoniae), consider antibiotic de-escalation to target atypical bacterial infection.          [x]  Radiology  CT Head Without Contrast    Result Date: 2/27/2025  Impression: 1.No definite acute intracranial findings. 2.Diffuse brain atrophy. 3.Extensive periventricular  hypodense areas compatible with chronic small vessel ischemia. Electronically Signed: Leandro Feliciano MD  2/27/2025 2:13 PM EST  Workstation ID: [id number]   []  EKG/Telemetry   []  Cardiology/Vascular   []  Pathology  []  Old records  []  Other:    Assessment & Plan   Assessment / Plan     Assessment:  Acute hypoxic respiratory failure  Right side pneumonia, unknown bacterial origin  COVID-19 positive  Generalized weakness secondary to above  HFpEF, not in acute exacerbation  Hyperlipidemia  Hypertension  A-fib on Eliquis  Leukocytosis  Sundowning/hospital related delirium -resolved  Ascending AAA, 5.2 cm    Plan:  Continue inpatient admission  Continue supplemental O2 as needed.  Continue to wean as tolerated.  Completed IV Rocephin and azithromycin  Brovana, Pulmicort, DuoNebs  Incentive spirometer, Acapella, chest vest  Echo: EF 61%  Resume home Eliquis  Heart rate has been low, will hold home Coreg  Continue Eliquis 5 mg twice daily  CT scan reviewed.  Right sided bronchopneumonia.  AAA 5.2 cm - will need vascular follow-up.  Suspect patient has underlying sleep apnea or obesity hypoventilation syndrome given that he is a chronic CO2 retainer.  Will refer him to sleep eval at discharge.  PT/OT  A.m. labs  Original plans for discharge to SNF however, SNF called back indicating patient needs to remain in the hospital until out of isolation window       Discussed with RN.    VTE Prophylaxis:  Pharmacologic VTE prophylaxis orders are present.        CODE STATUS:   Level Of Support Discussed With: Patient  Code Status (Patient has no pulse and is not breathing): CPR (Attempt to Resuscitate)  Medical Interventions (Patient has pulse or is breathing): Full Support      Electronically signed by Alfredo Oliver MD, 3/6/2025, 17:45 EST.

## 2025-03-06 NOTE — PLAN OF CARE
Goal Outcome Evaluation:Pt wore BIPAP less than 2 hours @ 14/6, 28%. He is on 2 L when not on BIPAP.

## 2025-03-06 NOTE — PROGRESS NOTES
RT EQUIPMENT DEVICE RELATED - SKIN ASSESSMENT    RT Medical Equipment/Device:     NIV Mask:  Full-face    size: m    Skin Assessment:      Nose:  Intact    Device Skin Pressure Protection:  Skin-to skin areas padded    Nurse Notification:  Roseanna Albarran, RRT

## 2025-03-06 NOTE — PLAN OF CARE
Goal Outcome Evaluation:  Plan of Care Reviewed With: patient         VSS during shift. Patient A/O and pleasant. Reports of pain, treated per MAR. Patient ambulated with one assist to chair, tolerated well. DC telemetry orders per MD order. Waiting on MS bed for transfer. Continue plan of care.

## 2025-03-06 NOTE — THERAPY EVALUATION
RT EQUIPMENT DEVICE RELATED - SKIN ASSESSMENT    RT Medical Equipment/Device:     NIV Mask:  Full-face    size: .    Skin Assessment:      Cheek:  Intact  Chin:  Intact  Nose:  Intact    Device Skin Pressure Protection:  Positioning supports utilized    Nurse Notification:  Roseanna Brito, RRT

## 2025-03-06 NOTE — PLAN OF CARE
Goal Outcome Evaluation:  Plan of Care Reviewed With: patient        Progress: improving  Outcome Evaluation: SB in 30's while asleep.VSS. Remains in isolation r/t covid diagnosis. Purewick in place. LAC PIV patent and with out redness or warmth. Yells out and agitated with staff. Encouraged pt to use call light. Pt continued to yell. Redirection effective. C/o chronic neck and back pain. Pain managed with PRN pain medication. Call light left in reach.

## 2025-03-07 VITALS
BODY MASS INDEX: 42.77 KG/M2 | OXYGEN SATURATION: 98 % | DIASTOLIC BLOOD PRESSURE: 90 MMHG | RESPIRATION RATE: 18 BRPM | HEART RATE: 66 BPM | WEIGHT: 298.72 LBS | SYSTOLIC BLOOD PRESSURE: 113 MMHG | HEIGHT: 70 IN | TEMPERATURE: 97.7 F

## 2025-03-07 PROBLEM — I50.32 CHRONIC HEART FAILURE WITH PRESERVED EJECTION FRACTION (HFPEF): Status: ACTIVE | Noted: 2025-03-07

## 2025-03-07 LAB
ANION GAP SERPL CALCULATED.3IONS-SCNC: 8.3 MMOL/L (ref 5–15)
BUN SERPL-MCNC: 9 MG/DL (ref 8–23)
BUN/CREAT SERPL: 11.1 (ref 7–25)
CALCIUM SPEC-SCNC: 9 MG/DL (ref 8.6–10.5)
CHLORIDE SERPL-SCNC: 96 MMOL/L (ref 98–107)
CO2 SERPL-SCNC: 27.7 MMOL/L (ref 22–29)
CREAT SERPL-MCNC: 0.81 MG/DL (ref 0.76–1.27)
DEPRECATED RDW RBC AUTO: 47.2 FL (ref 37–54)
EGFRCR SERPLBLD CKD-EPI 2021: 94.3 ML/MIN/1.73
ERYTHROCYTE [DISTWIDTH] IN BLOOD BY AUTOMATED COUNT: 12.9 % (ref 12.3–15.4)
GLUCOSE SERPL-MCNC: 110 MG/DL (ref 65–99)
HCT VFR BLD AUTO: 44.4 % (ref 37.5–51)
HGB BLD-MCNC: 13.8 G/DL (ref 13–17.7)
HOLD SPECIMEN: NORMAL
MAGNESIUM SERPL-MCNC: 2 MG/DL (ref 1.6–2.4)
MCH RBC QN AUTO: 30.9 PG (ref 26.6–33)
MCHC RBC AUTO-ENTMCNC: 31.1 G/DL (ref 31.5–35.7)
MCV RBC AUTO: 99.3 FL (ref 79–97)
PLATELET # BLD AUTO: 376 10*3/MM3 (ref 140–450)
PMV BLD AUTO: 9.7 FL (ref 6–12)
POTASSIUM SERPL-SCNC: 4.6 MMOL/L (ref 3.5–5.2)
RBC # BLD AUTO: 4.47 10*6/MM3 (ref 4.14–5.8)
SODIUM SERPL-SCNC: 132 MMOL/L (ref 136–145)
WBC NRBC COR # BLD AUTO: 12.35 10*3/MM3 (ref 3.4–10.8)

## 2025-03-07 PROCEDURE — 85027 COMPLETE CBC AUTOMATED: CPT | Performed by: INTERNAL MEDICINE

## 2025-03-07 PROCEDURE — 80048 BASIC METABOLIC PNL TOTAL CA: CPT | Performed by: INTERNAL MEDICINE

## 2025-03-07 PROCEDURE — 94761 N-INVAS EAR/PLS OXIMETRY MLT: CPT

## 2025-03-07 PROCEDURE — 94799 UNLISTED PULMONARY SVC/PX: CPT

## 2025-03-07 PROCEDURE — 83735 ASSAY OF MAGNESIUM: CPT | Performed by: INTERNAL MEDICINE

## 2025-03-07 PROCEDURE — 99238 HOSP IP/OBS DSCHRG MGMT 30/<: CPT | Performed by: INTERNAL MEDICINE

## 2025-03-07 PROCEDURE — 94664 DEMO&/EVAL PT USE INHALER: CPT

## 2025-03-07 PROCEDURE — 97110 THERAPEUTIC EXERCISES: CPT

## 2025-03-07 RX ORDER — FUROSEMIDE 40 MG/1
40 TABLET ORAL 2 TIMES DAILY
Start: 2025-03-07

## 2025-03-07 RX ORDER — IPRATROPIUM BROMIDE AND ALBUTEROL SULFATE 2.5; .5 MG/3ML; MG/3ML
3 SOLUTION RESPIRATORY (INHALATION) EVERY 6 HOURS PRN
Qty: 360 ML | Refills: 0 | Status: SHIPPED | OUTPATIENT
Start: 2025-03-07

## 2025-03-07 RX ORDER — GABAPENTIN 400 MG/1
800 CAPSULE ORAL EVERY 8 HOURS SCHEDULED
Qty: 9 CAPSULE | Refills: 0 | Status: SHIPPED | OUTPATIENT
Start: 2025-03-07

## 2025-03-07 RX ORDER — HYDROCODONE BITARTRATE AND ACETAMINOPHEN 10; 325 MG/1; MG/1
1 TABLET ORAL EVERY 6 HOURS PRN
Qty: 12 TABLET | Refills: 0 | Status: SHIPPED | OUTPATIENT
Start: 2025-03-07

## 2025-03-07 RX ADMIN — Medication 10 ML: at 07:50

## 2025-03-07 RX ADMIN — APIXABAN 5 MG: 5 TABLET, FILM COATED ORAL at 07:51

## 2025-03-07 RX ADMIN — Medication 250 MG: at 07:51

## 2025-03-07 RX ADMIN — PANTOPRAZOLE SODIUM 40 MG: 40 TABLET, DELAYED RELEASE ORAL at 07:51

## 2025-03-07 RX ADMIN — FUROSEMIDE 40 MG: 40 TABLET ORAL at 07:50

## 2025-03-07 RX ADMIN — BUDESONIDE 0.5 MG: 0.5 INHALANT RESPIRATORY (INHALATION) at 06:42

## 2025-03-07 RX ADMIN — GABAPENTIN 800 MG: 400 CAPSULE ORAL at 05:08

## 2025-03-07 RX ADMIN — AMIODARONE HYDROCHLORIDE 200 MG: 200 TABLET ORAL at 07:52

## 2025-03-07 RX ADMIN — GUAIFENESIN 600 MG: 600 TABLET ORAL at 07:51

## 2025-03-07 RX ADMIN — GABAPENTIN 800 MG: 400 CAPSULE ORAL at 13:51

## 2025-03-07 RX ADMIN — IPRATROPIUM BROMIDE AND ALBUTEROL SULFATE 3 ML: .5; 3 SOLUTION RESPIRATORY (INHALATION) at 06:42

## 2025-03-07 RX ADMIN — HYDROCODONE BITARTRATE AND ACETAMINOPHEN 1 TABLET: 10; 325 TABLET ORAL at 02:39

## 2025-03-07 RX ADMIN — ARFORMOTEROL TARTRATE 15 MCG: 15 SOLUTION RESPIRATORY (INHALATION) at 06:42

## 2025-03-07 RX ADMIN — CLOPIDOGREL BISULFATE 75 MG: 75 TABLET ORAL at 07:52

## 2025-03-07 RX ADMIN — HYDROCODONE BITARTRATE AND ACETAMINOPHEN 1 TABLET: 10; 325 TABLET ORAL at 08:19

## 2025-03-07 NOTE — PLAN OF CARE
Goal Outcome Evaluation:      Patient transferred to [facility] surg. Positive for COVID, ending 3/7. Complains of pain, gave PRN pain medication as ordered. No acute events this shift. Awaiting rehab placement.

## 2025-03-07 NOTE — PLAN OF CARE
Goal Outcome Evaluation: Pt was not interested in wearing Bipap this shift. He remains on a 2 lpm with no issues. We will continue to encourage NPPV

## 2025-03-07 NOTE — PLAN OF CARE
Goal Outcome Evaluation: Pt was not interested in wearing Bipap this shift. Instead, he remained on his 2 lpm nc with no issues. We will continue to encourage use of NPPV at HS and for naps.

## 2025-03-07 NOTE — PLAN OF CARE
Goal Outcome Evaluation:  Plan of Care Reviewed With: patient        Progress: no change  Outcome Evaluation: Pt did not wear Bipap last night. Pt remained on his 2 lpm nc with no issues. We will continue to encourage use of NPPV at HS and for naps

## 2025-03-07 NOTE — PROGRESS NOTES
RT EQUIPMENT DEVICE RELATED - SKIN ASSESSMENT    RT Medical Equipment/Device:     NIV Mask:  Full-face    size: M    Skin Assessment:      Cheek:  Intact  Chin:  Intact  Forehead:  Intact  Nose:  Intact    Device Skin Pressure Protection:  Positioning supports utilized    Nurse Notification:  Roseanna Farias, RRT

## 2025-03-07 NOTE — DISCHARGE SUMMARY
[facility]         HOSPITALIST  DISCHARGE SUMMARY    Patient Name: Sarah Osorio  : 1955  MRN: [id number]    Date of Admission: 2025  Date of Discharge: 3/7/2025  Primary Care Physician: Silvia Faustin APRN    Consults       Date and Time Order Name Status Description    2025  6:55 PM Hospitalist (on-call MD unless specified)              Active and Resolved Hospital Problems:  Active Hospital Problems    Diagnosis POA    **Acute on chronic respiratory failure with hypoxia [J96.21] Yes    Chronic heart failure with preserved ejection fraction (HFpEF) [I50.32] Yes    Weakness [R53.1] Unknown    Fall [W19.XXXA] Unknown    Pneumonia due to infectious organism [J18.9] Unknown    Congestive heart failure [I50.9] Unknown    HTN (hypertension) [I10] Unknown      Resolved Hospital Problems   No resolved problems to display.       Hospital Course     Hospital Course:  Sarah Osorio is a 70 y.o. male with past medical history of hypertension, hyperlipidemia, diastolic CHF, A-fib on Eliquis, chronic back pain following injury, morbid obesity and GERD presented to the ED for evaluation of generalized weakness, dyspnea exertion, and fall.       Patient admitted for acute hypoxic respiratory failure. Labs showed leukocytosis, electrolyte imbalances, elevated proBNP and elevated but flat troponin. CT chest negative for PE but did show some right-sided bronchopneumonia.  Started on IV antibiotics for community-acquired pneumonia of unknown bacterial origin.  CT scan did show a 5.2 cm ascending aorta, echo with only mild enlargement of aorta and EF 61%, he will need outpatient vascular follow-up.  Patient developed worsening respiratory status and some hospital related delirium.  Respiratory PCR positive for COVID-19 and coronavirus strain.  He was initiated on p.o. Decadron, this was subsequently discontinued due to AMS/agitation.  Respiratory status improved, mental status improved.   Patient feeling more weak than baseline, and has been accepted to rehab and will be discharged to facility on today for that purpose.    DISCHARGE Follow Up Recommendations for labs and diagnostics: BMP/CBC in 1 week.  Patient should follow-up with vascular surgery regarding his AAA as well as sleep medicine for sleep study.    Day of Discharge     Vital Signs:  Temp:  [97.7 °F (36.5 °C)-98.6 °F (37 °C)] 97.7 °F (36.5 °C)  Heart Rate:  [54-66] 66  Resp:  [16-18] 18  BP: (113-145)/(65-90) 113/90  Flow (L/min) (Oxygen Therapy):  [2] 2  Physical Exam:   Gen: NAD, Alert and Oriented  Cards: RRR  Pulm: Clear to auscultation bilaterally, no respiratory distress  Abd: soft, nondistended  Extremities: no pitting edema    Discharge Details        Discharge Medications        New Medications        Instructions Start Date   gabapentin 400 MG capsule  Commonly known as: NEURONTIN  Replaces: gabapentin 800 MG tablet   800 mg, Oral, Every 8 Hours Scheduled      guaiFENesin 600 MG 12 hr tablet  Commonly known as: MUCINEX   600 mg, Oral, Every 12 Hours Scheduled      ipratropium-albuterol 0.5-2.5 mg/3 ml nebulizer  Commonly known as: DUO-NEB   3 mL, Nebulization, Every 6 Hours PRN      saccharomyces boulardii 250 MG capsule  Commonly known as: FLORASTOR   250 mg, Oral, 2 Times Daily             Changes to Medications        Instructions Start Date   HYDROcodone-acetaminophen  MG per tablet  Commonly known as: NORCO  What changed: reasons to take this   1 tablet, Oral, Every 6 Hours PRN             Continue These Medications        Instructions Start Date   amiodarone 200 MG tablet  Commonly known as: PACERONE   200 mg, Daily      apixaban 5 MG tablet tablet  Commonly known as: ELIQUIS   5 mg, Every 12 Hours Scheduled      carvedilol 3.125 MG tablet  Commonly known as: COREG   3.125 mg, 2 Times Daily With Meals      clopidogrel 75 MG tablet  Commonly known as: PLAVIX   75 mg, Daily      Entresto 49-51 MG tablet  Generic drug:  sacubitril-valsartan   1 tablet, 2 Times Daily      furosemide 40 MG tablet  Commonly known as: LASIX   40 mg, Oral, 2 Times Daily      multivitamin with minerals tablet tablet   1 tablet, Daily      pantoprazole 40 MG EC tablet  Commonly known as: PROTONIX   40 mg, Daily      pravastatin 40 MG tablet  Commonly known as: PRAVACHOL   40 mg, Daily             Stop These Medications      gabapentin 800 MG tablet  Commonly known as: NEURONTIN  Replaced by: gabapentin 400 MG capsule              No Known Allergies    Discharge Disposition:  Skilled Nursing Facility (DC - External)    Diet:  Hospital:  Diet Order   Procedures    Diet: Cardiac; Healthy Heart (2-3 Na+); Fluid Consistency: Thin (IDDSI 0)       Discharge Activity:   Activity Instructions       Activity as Tolerated              CODE STATUS:  Code Status and Medical Interventions: CPR (Attempt to Resuscitate); Full Support   Ordered at: 02/22/25 2000     Level Of Support Discussed With:    Patient     Code Status (Patient has no pulse and is not breathing):    CPR (Attempt to Resuscitate)     Medical Interventions (Patient has pulse or is breathing):    Full Support         No future appointments.    Additional Instructions for the Follow-ups that You Need to Schedule       Ambulatory Referral to Sleep Medicine   As directed      Follow-up needed: Yes        Discharge Follow-up with PCP   As directed       Currently Documented PCP:    Silvia Faustin APRN    PCP Phone Number:    [phone]     Follow Up Details: In less than one week                Pertinent  and/or Most Recent Results     PROCEDURES:   None    LAB RESULTS:      Lab 03/07/25  0606 03/06/25  0520 03/05/25  0519 03/04/25  0447 03/02/25  0547 03/01/25  0436   WBC 12.35* 9.17 9.24 12.13* 8.98 10.95*   HEMOGLOBIN 13.8 14.1 13.1 14.1 14.3 14.9   HEMATOCRIT 44.4 44.2 40.8 44.3 43.9 46.4   PLATELETS 376 316 285 283 278 311   NEUTROS ABS  --   --   --  9.53* 6.60 8.85*   IMMATURE GRANS (ABS)  --    --   --  0.14* 0.13* 0.13*   LYMPHS ABS  --   --   --  1.16 1.36 1.13   MONOS ABS  --   --   --  0.71 0.60 0.77   EOS ABS  --   --   --  0.55* 0.26 0.05   MCV 99.3* 98.7* 97.1* 95.3 95.4 96.3         Lab 03/07/25  0810 03/07/25  0606 03/06/25  0520 03/05/25  0519 03/04/25  0448 03/03/25  0542 03/02/25  0547 03/01/25  0436   SODIUM 132*  --  136 136 136 128* 131* 133*   POTASSIUM 4.6  --  5.2 4.2 3.9 3.5 3.5 3.4*   CHLORIDE 96*  --  98 99 94* 88* 89* 88*   CO2 27.7  --  32.6* 33.4* 35.2* 35.7* 35.3* 36.9*   ANION GAP 8.3  --  5.4 3.6* 6.8 4.3* 6.7 8.1   BUN 9  --  10 14 19 27* 30* 25*   CREATININE 0.81  --  0.74* 0.83 0.90 0.77 0.84 0.96   EGFR 94.3  --  96.9 93.6 91.3 95.7 93.2 84.5   GLUCOSE 110*  --  141* 137* 136* 118* 129* 213*   CALCIUM 9.0  --  9.2 8.5* 8.8 8.2* 8.1* 8.8   MAGNESIUM  --  2.0 2.3 2.5*  --   --  2.2 2.2   PHOSPHORUS  --   --   --   --   --   --  3.7 3.9                         Brief Urine Lab Results  (Last result in the past 365 days)        Color   Clarity   Blood   Leuk Est   Nitrite   Protein   CREAT   Urine HCG        02/26/25 1328 Yellow   Clear   Negative   Negative   Negative   Negative                 Microbiology Results (last 10 days)       ** No results found for the last 240 hours. **            CT Head Without Contrast    Result Date: 2/27/2025  Impression: Impression: 1.No definite acute intracranial findings. 2.Diffuse brain atrophy. 3.Extensive periventricular hypodense areas compatible with chronic small vessel ischemia. Electronically Signed: Leandro Feliciano MD  2/27/2025 2:13 PM EST  Workstation ID: [id number]    XR Chest 1 View    Result Date: 2/25/2025  Impression: Impression: 1. Low lung volumes with bilateral perihilar airspace opacities likely representing combination of atelectasis and bronchopneumonia. Electronically Signed: Georges Packer  2/25/2025 3:50 PM EST  Workstation ID: [id number]    CT Chest With Contrast Diagnostic    Addendum Date: 2/22/2025    ADDENDUM:   Postoperative changes involve the cervical spine and are partially imaged on the study. There are degenerative changes throughout the imaged spine.   Portions of this note were completed with a voice recognition program.  2/22/2025 9:42 PM by Sampson Giordano MD on Workstation: [employer]      Result Date: 2/22/2025  Impression: 1.  Grossly, no proximal (or central) no pulmonary embolism. 2.  There is fusiform dilatation of the ascending aorta, which measures about 5.2 cm greatest diameter. No thoracic aortic dissection is seen. 3.  Right-sided bronchopneumonia is suggested. 4.  Please see above comments for further detail.   Portions of this note were completed with a voice recognition program.  2/22/2025 9:39 PM by Sampson Giordano MD on Workstation: [facility]      CT Head Without Contrast    Result Date: 2/22/2025  Impression: Impression: No acute intracranial pathology. Electronically Signed: Sampson Brooks MD  2/22/2025 4:01 PM EST  Workstation ID: [id number]    XR Chest 1 View    Result Date: 2/22/2025  Impression: Impression: Hazy increased density over the right hemithorax may represent mild airspace disease. No dense consolidation or mass is seen. Electronically Signed: Alireza Flor MD  2/22/2025 3:13 PM EST  Workstation ID: [id number]             Results for orders placed during the hospital encounter of 02/22/25    Adult Transthoracic Echo Complete W/ Cont if Necessary Per Protocol    Interpretation Summary    The study is technically suboptimal for diagnosis    Left ventricular systolic function is normal. Calculated left ventricular EF = 61.7%    Left ventricular diastolic function was indeterminate.    The right atrial cavity is borderline dilated.    There is mild calcification of the aortic valve.    Mild dilation of the aortic root is present.      Labs Pending at Discharge: None        Time spent on Discharge including face to face service: 30 minutes    Electronically signed by Jam Aiken MD,  03/07/25, 5:05 PM EST.

## 2025-03-07 NOTE — THERAPY TREATMENT NOTE
Patient Name: Sarah Osorio  : 1955    MRN: [id number]                              Today's Date: 3/7/2025       Admit Date: 2025    Visit Dx:     ICD-10-CM ICD-9-CM   1. Fall, initial encounter  W19.XXXA E888.9   2. Weakness  R53.1 780.79   3. Pneumonia due to infectious organism, unspecified laterality, unspecified part of lung  J18.9 486   4. Congestive heart failure, unspecified HF chronicity, unspecified heart failure type  I50.9 428.0   5. Acute respiratory failure with hypoxia  J96.01 518.81   6. Difficulty in walking  R26.2 719.7   7. Decreased activities of daily living (ADL)  Z78.9 V49.89   8. Sleep apnea, unspecified type  G47.30 780.57   9. Abdominal aortic aneurysm (AAA) without rupture, unspecified part  I71.40 441.4   10. Chronic pain syndrome  G89.4 338.4     Patient Active Problem List   Diagnosis    Seizure    Encephalopathy, toxic    Acute on chronic respiratory failure with hypoxia    Weakness    Fall    Pneumonia due to infectious organism    Congestive heart failure    HTN (hypertension)    Chronic heart failure with preserved ejection fraction (HFpEF)     Past Medical History:   Diagnosis Date    Abdominal aneurysm     Atrial fib/flutter, transient     CHF (congestive heart failure)     Hyperlipidemia     Hypertension     Injury of back      Past Surgical History:   Procedure Laterality Date    APPENDECTOMY      CARDIAC CATHETERIZATION      HERNIA REPAIR        General Information       Row Name 25 1151          OT Time and Intention    Document Type therapy note (daily note)  -PG     Mode of Treatment individual therapy;occupational therapy  -PG               User Key  (r) = Recorded By, (t) = Taken By, (c) = Cosigned By      Initials Name Provider Type    PG Ambrose Lincoln OT Occupational Therapist                     Mobility/ADL's       Row Name 25 1151          Transfers    Transfers bed-chair transfer  -PG       Row Name 25 1151          Bed-Chair Transfer     Bed-Chair [place] (Transfers) contact guard  -PG     Assistive Device (Bed-Chair Transfers) walker, front-wheeled  -PG               User Key  (r) = Recorded By, (t) = Taken By, (c) = Cosigned By      Initials Name Provider Type    PG Ambrose Lincoln OT Occupational Therapist                   Obj/Interventions       Row Name 03/07/25 1151          Shoulder (Therapeutic Exercise)    Shoulder (Therapeutic Exercise) strengthening exercise  -PG     Shoulder Strengthening (Therapeutic Exercise) resistance band;yellow  -PG       Row Name 03/07/25 1151          Elbow/Forearm (Therapeutic Exercise)    Elbow/Forearm (Therapeutic Exercise) strengthening exercise  -PG     Elbow/Forearm Strengthening (Therapeutic Exercise) resistance band;yellow  -PG       Row Name 03/07/25 1151          Motor Skills    Therapeutic Exercise shoulder;elbow/forearm  -PG               User Key  (r) = Recorded By, (t) = Taken By, (c) = Cosigned By      Initials Name Provider Type    PG Ambrose Lincoln, SHANNAN Occupational Therapist                   Goals/Plan    No documentation.                  Clinical Impression       Row Name 03/07/25 1152          Plan of Care Review    Plan of Care Reviewed With patient  -PG     Progress no change  -PG               User Key  (r) = Recorded By, (t) = Taken By, (c) = Cosigned By      Initials Name Provider Type    Ambrose Sherwood OT Occupational Therapist                   Outcome Measures       Row Name 03/07/25 1152          How much help from another is currently needed...    Putting on and taking off regular lower body clothing? 1  -PG     Bathing (including washing, rinsing, and drying) 2  -PG     Toileting (which includes using toilet bed pan or urinal) 2  -PG     Putting on and taking off regular upper body clothing 3  -PG     Taking care of personal grooming (such as brushing teeth) 3  -PG     Eating meals 4  -PG     AM-PAC 6 Clicks Score (OT) 15  -PG       Row Name 03/07/25 0849 03/07/25 0100        How much help from another person do you currently need...    Turning from your back to your side while in flat bed without using bedrails? 4  -HAIDER 4  -BB    Moving from lying on back to sitting on the side of a flat bed without bedrails? 3  -HAIDER 3  -BB    Moving to and from a bed to a chair (including a wheelchair)? 3  -AHIDER 3  -BB    Standing up from a chair using your arms (e.g., wheelchair, bedside chair)? 3  -HAIDER 3  -BB    Climbing 3-5 steps with a railing? 2  -HAIDER 2  -BB    To walk in hospital room? 3  -HAIDER 3  -BB    AM-PAC 6 Clicks Score (PT) 18  -HAIDER 18  -BB    Highest Level of Mobility Goal 6 --> Walk 10 steps or more  -HAIDER 6 --> Walk 10 steps or more  -BB      Row Name 03/07/25 1152          Functional Assessment    Outcome Measure Options AM-PAC 6 Clicks Daily Activity (OT);Optimal Instrument  -PG       Row Name 03/07/25 1152          Optimal Instrument    Optimal Instrument Optimal - 3  -PG     Bending/Stooping 3  -PG     Standing 2  -PG     Reaching 2  -PG               User Key  (r) = Recorded By, (t) = Taken By, (c) = Cosigned By      Initials Name Provider Type    Lesli Wright, RN Registered Nurse    Ambrose Sherwood OT Occupational Therapist    BB Nuria Arroyo, RN Registered Nurse                    Occupational Therapy Education       Title: PT OT SLP Therapies (In Progress)       Topic: Occupational Therapy (Done)       Point: ADL training (Done)       Description:   Instruct learner(s) on proper safety adaptation and remediation techniques during self care or transfers.   Instruct in proper use of assistive devices.                  Learning Progress Summary            Patient Acceptance, E, VU by LU at 2/28/2025 1110                      Point: Home exercise program (Done)       Description:   Instruct learner(s) on appropriate technique for monitoring, assisting and/or progressing therapeutic exercises/activities.                  Learning Progress Summary            Patient Acceptance, E,  VU by AV at 2/28/2025 1110                      Point: Precautions (Done)       Description:   Instruct learner(s) on prescribed precautions during self-care and functional transfers.                  Learning Progress Summary            Patient Acceptance, E, VU by AV at 2/28/2025 1110                      Point: Body mechanics (Done)       Description:   Instruct learner(s) on proper positioning and spine alignment during self-care, functional mobility activities and/or exercises.                  Learning Progress Summary            Patient Acceptance, E, VU by AV at 2/28/2025 1110                                      User Key       Initials Effective Dates Name Provider Type Discipline    AV 06/16/21 -  Juan Morris OT Occupational Therapist OT                  OT Recommendation and Plan     Plan of Care Review  Plan of Care Reviewed With: patient  Progress: no change     Time Calculation:         Time Calculation- OT       Row Name 03/07/25 1153             Time Calculation- OT    OT Received On 03/07/25  -PG      OT Goal Re-Cert Due Date 03/09/25  -PG         Timed Charges    96193 - OT Therapeutic Exercise Minutes 10  -PG      50314 - OT Therapeutic Activity Minutes 5  -PG         Total Minutes    Timed Charges Total Minutes 15  -PG       Total Minutes 15  -PG                User Key  (r) = Recorded By, (t) = Taken By, (c) = Cosigned By      Initials Name Provider Type    PG Ambrose Lincoln OT Occupational Therapist                  Therapy Charges for Today       Code Description Service Date Service Provider Modifiers Qty    [id number] HC OT THER PROC EA 15 MIN 3/7/2025 Ambrose Lincoln OT GO 1                 Ambrose Lincoln OT  3/7/2025

## 2025-03-08 ENCOUNTER — NURSING HOME (OUTPATIENT)
Dept: INTERNAL MEDICINE | Age: 70
End: 2025-03-08
Payer: MEDICARE

## 2025-03-08 DIAGNOSIS — J18.9 PNEUMONIA DUE TO INFECTIOUS ORGANISM, UNSPECIFIED LATERALITY, UNSPECIFIED PART OF LUNG: ICD-10-CM

## 2025-03-08 DIAGNOSIS — U07.1 COVID-19 VIRUS INFECTION: ICD-10-CM

## 2025-03-08 DIAGNOSIS — I50.9 CONGESTIVE HEART FAILURE, UNSPECIFIED HF CHRONICITY, UNSPECIFIED HEART FAILURE TYPE: ICD-10-CM

## 2025-03-08 DIAGNOSIS — J96.21 ACUTE ON CHRONIC RESPIRATORY FAILURE WITH HYPOXIA: Primary | ICD-10-CM

## 2025-03-08 DIAGNOSIS — I10 PRIMARY HYPERTENSION: ICD-10-CM

## 2025-03-08 DIAGNOSIS — R53.1 WEAKNESS: ICD-10-CM

## 2025-03-08 RX ORDER — GABAPENTIN 400 MG/1
400 CAPSULE ORAL 3 TIMES DAILY
Qty: 90 CAPSULE | Refills: 5 | Status: SHIPPED | OUTPATIENT
Start: 2025-03-08

## 2025-03-08 RX ORDER — HYDROCODONE BITARTRATE AND ACETAMINOPHEN 10; 325 MG/1; MG/1
1 TABLET ORAL EVERY 6 HOURS PRN
Qty: 90 TABLET | Refills: 0 | Status: SHIPPED | OUTPATIENT
Start: 2025-03-08

## 2025-03-08 NOTE — PROGRESS NOTES
Nursing Home History and Physical Note      Emiliano Lincoln MD  [x]  [address], [address]  [place] 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 with pneumonia weakness, he came to our facility last night and since then he is already had a fall in the room with a skin tear to his right arm and was complaining of some shoulder pain on the left side since he fell outward onto the floor, I have already instructed the nurses to get an x-ray of his shoulder and arm as a precaution, patient tells me his left side has been a little bit weaker for the last 12 years since he fell off the roof and injured his neck with a fracture, and they even thought he might of had a small stroke at 1 point, he is a 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    His hospital course:    Hospital Course:  Sarah Osorio is a 70 y.o. male with past medical history of hypertension, hyperlipidemia, diastolic CHF, A-fib on Eliquis, chronic back pain following injury, morbid obesity and GERD presented to the ED for evaluation of generalized weakness, dyspnea exertion,  and fall.       Patient admitted for acute hypoxic respiratory failure. Labs showed leukocytosis, electrolyte imbalances, elevated proBNP and elevated but flat troponin. CT chest negative for PE but did show some right-sided bronchopneumonia.  Started on IV antibiotics for community-acquired pneumonia of unknown bacterial origin.  CT scan did show a 5.2 cm ascending aorta, echo with only mild enlargement of aorta and EF 61%, he will need outpatient vascular follow-up.  Patient developed worsening respiratory status and some hospital related delirium.  Respiratory PCR positive for COVID-19 and coronavirus strain.  He was initiated on p.o. Decadron, this was subsequently discontinued due to AMS/agitation.  Respiratory status improved, mental status improved.  Patient feeling more weak than baseline, and has been accepted to rehab and will be discharged to facility on today for that purpose.     DISCHARGE Follow Up Recommendations for labs and diagnostics: BMP/CBC in 1 week.  Patient should follow-up with vascular surgery regarding his AAA as well as sleep medicine for sleep study.         PAST MEDICAL & SURGICAL HISTORY:   Past Medical History:   Diagnosis Date    Abdominal aneurysm     Atrial fib/flutter, transient     CHF (congestive heart failure)     Hyperlipidemia     Hypertension     Injury of back       Past Surgical History:   Procedure Laterality Date    APPENDECTOMY      CARDIAC CATHETERIZATION      HERNIA REPAIR            MEDICATIONS:  I have reviewed and reconciled the patients medication list in the patients chart at the skilled nursing facility today.      ALLERGIES:  No Known Allergies      SOCIAL HISTORY:  Social History     Socioeconomic History    Marital status:    Tobacco Use    Smoking status: Never    Smokeless tobacco: Never   Vaping Use    Vaping status: Never Used   Substance and Sexual Activity    Alcohol use: Not Currently    Drug use: Never    Sexual activity: Defer       FAMILY  HISTORY:  Contributory to this admission    PHYSICAL EXAMINATION:   VITAL SIGNS: 135/71 pulse 67 respiratory rate 16 temperature 98.6 sat 99% on 2 L of oxygen,    PHYSICAL EXAM: Patient is awake alert pleasant talkative answering questions well wearing oxygen he is got some tenderness to palpation of his left shoulder with a bruise down the anterior portion of the proximal shoulder area, its painful to lift the shoulder up no gross deformity but there is tenderness to  his lungs are clear laterally and anteriorly his cardiac exam reveals a irregularly irregular rhythm controlled rate abdomen is obese soft nontender lower extremities show chronic stasis dermatitis changes around the ankles and lower PreTAB regions I cannot palpate a DP or PT pulse he is 1+ to 2+ pedal edema bilaterally, he can move his feet and toes and hands to command,  RECORDS REVIEW:   Orders Reviewed.  Labs Reviewed.      ICD-10-CM ICD-9-CM   1. Acute on chronic respiratory failure with hypoxia  J96.21 518.84     799.02   2. Pneumonia due to infectious organism, unspecified laterality, unspecified part of lung  J18.9 486   3. Weakness  R53.1 780.79   4. Congestive heart failure, unspecified HF chronicity, unspecified heart failure type  I50.9 428.0   5. Primary hypertension  I10 401.9   6. COVID-19 virus infection  U07.1 079.89       ASSESSMENT/PLAN    Diagnoses and all orders for this visit:    1. Acute on chronic respiratory failure with hypoxia (Primary)  -     gabapentin (NEURONTIN) 400 MG capsule; Take 1 capsule by mouth 3 (Three) Times a Day.  Dispense: 90 capsule; Refill: 5  -     HYDROcodone-acetaminophen (NORCO)  MG per tablet; Take 1 tablet by mouth Every 6 (Six) Hours As Needed for Moderate Pain.  Dispense: 90 tablet; Refill: 0    2. Pneumonia due to infectious organism, unspecified laterality, unspecified part of lung  -     gabapentin (NEURONTIN) 400 MG capsule; Take 1 capsule by mouth 3 (Three) Times a Day.  Dispense: 90  capsule; Refill: 5  -     HYDROcodone-acetaminophen (NORCO)  MG per tablet; Take 1 tablet by mouth Every 6 (Six) Hours As Needed for Moderate Pain.  Dispense: 90 tablet; Refill: 0    3. Weakness  -     gabapentin (NEURONTIN) 400 MG capsule; Take 1 capsule by mouth 3 (Three) Times a Day.  Dispense: 90 capsule; Refill: 5  -     HYDROcodone-acetaminophen (NORCO)  MG per tablet; Take 1 tablet by mouth Every 6 (Six) Hours As Needed for Moderate Pain.  Dispense: 90 tablet; Refill: 0    4. Congestive heart failure, unspecified HF chronicity, unspecified heart failure type  -     gabapentin (NEURONTIN) 400 MG capsule; Take 1 capsule by mouth 3 (Three) Times a Day.  Dispense: 90 capsule; Refill: 5  -     HYDROcodone-acetaminophen (NORCO)  MG per tablet; Take 1 tablet by mouth Every 6 (Six) Hours As Needed for Moderate Pain.  Dispense: 90 tablet; Refill: 0    5. Primary hypertension  -     gabapentin (NEURONTIN) 400 MG capsule; Take 1 capsule by mouth 3 (Three) Times a Day.  Dispense: 90 capsule; Refill: 5  -     HYDROcodone-acetaminophen (NORCO)  MG per tablet; Take 1 tablet by mouth Every 6 (Six) Hours As Needed for Moderate Pain.  Dispense: 90 tablet; Refill: 0    6. COVID-19 virus infection  -     gabapentin (NEURONTIN) 400 MG capsule; Take 1 capsule by mouth 3 (Three) Times a Day.  Dispense: 90 capsule; Refill: 5  -     HYDROcodone-acetaminophen (NORCO)  MG per tablet; Take 1 tablet by mouth Every 6 (Six) Hours As Needed for Moderate Pain.  Dispense: 90 tablet; Refill: 0       Acute hypoxic respiratory failure clinically improved but still wearing oxygen, possibly hypoventilation syndrome given obesity immobilization prolonged hospital stay, now needing rehab due to multiple medical issues as below    Fall at our facility several hours after admission with injury to the left shoulder will x-ray, discussed with nursing staff patient and family March 8, 2025    Pneumonia treated with IV  antibiotics in the hospital, PCR came back positive for COVID----- he was put on Decadron now continues Mucinex 600 mg twice a day, DuoNebs every 6 hours as needed, probiotics to 50 mg twice a day prophylactically    Delirium in the hospital multifactorial, CT brain February 22, 2025 showed no acute intracranial abnormality    Electrolyte imbalances will follow labs    Congestive heart failure with elevated proBNP, CT of the chest negative for PE, continues carvedilol 3.125 mg twice a day, Entresto 49-51 mg twice a day, Lasix 40 mg twice a day,-------- echocardiogram February 22, 2025 showed suboptimal imaging normal ejection fraction diastolic dysfunction indeterminant calcification of the aortic valve mild dilation of the aortic root    5.2 cm ascending aortic aneurysm found on CT scan echo showed only mild enlargement of the aorta with ejection fraction of 61%    Obesity    Atrial fibrillation chronic, continues amiodarone 200 mg daily, Eliquis 5 mg twice a day, carvedilol 3.125 mg twice a day    Questionable stroke, continues Plavix 75 mg daily    Hypertension as above    Hyperlipidemia, continues pravastatin 40 mg daily    Chronic back pain continues gabapentin for 100 mg 3 times a day, Norco  mg every 6 hours as needed    GERD, continues Protonix 40 mg daily    Previous fall 12 years ago with neck injury fracture, resultant left-sided weakness chronic    [facility] continue rehab efforts x-ray left shoulder check lab work baseline routine, orders reviewed, gabapentin and Norco prescription sent to the pharmacy,    Emiliano Lincoln MD

## 2025-03-12 ENCOUNTER — TELEPHONE (OUTPATIENT)
Dept: CARDIAC SURGERY | Facility: CLINIC | Age: 70
End: 2025-03-12
Payer: MEDICARE

## 2025-03-31 ENCOUNTER — TELEPHONE (OUTPATIENT)
Dept: CARDIAC SURGERY | Facility: CLINIC | Age: 70
End: 2025-03-31
Payer: MEDICARE

## 2025-03-31 NOTE — TELEPHONE ENCOUNTER
Spoke with patient to schedule with   Dr. Albrets. Patient will call back to schedule he's currently in the hospital. HUB if patient calls please forward to the office thank you.